# Patient Record
Sex: FEMALE | Race: OTHER | Employment: FULL TIME | URBAN - METROPOLITAN AREA
[De-identification: names, ages, dates, MRNs, and addresses within clinical notes are randomized per-mention and may not be internally consistent; named-entity substitution may affect disease eponyms.]

---

## 2017-10-06 ENCOUNTER — HOSPITAL ENCOUNTER (OUTPATIENT)
Dept: PREADMISSION TESTING | Age: 37
Discharge: HOME | End: 2017-10-06
Payer: COMMERCIAL

## 2017-10-06 VITALS — BODY MASS INDEX: 26.13 KG/M2 | HEIGHT: 62 IN | WEIGHT: 142 LBS

## 2017-10-06 LAB
ANION GAP SERPL CALC-SCNC: 8 MMOL/L (ref 3–18)
BACTERIA SPEC CULT: NORMAL
BUN SERPL-MCNC: 22 MG/DL (ref 7–18)
BUN/CREAT SERPL: 22 (ref 12–20)
CALCIUM SERPL-MCNC: 9.1 MG/DL (ref 8.5–10.1)
CHLORIDE SERPL-SCNC: 101 MMOL/L (ref 100–108)
CO2 SERPL-SCNC: 28 MMOL/L (ref 21–32)
CREAT SERPL-MCNC: 1.02 MG/DL (ref 0.6–1.3)
ERYTHROCYTE [DISTWIDTH] IN BLOOD BY AUTOMATED COUNT: 12.5 % (ref 11.6–14.5)
GLUCOSE SERPL-MCNC: 75 MG/DL (ref 74–99)
HCT VFR BLD AUTO: 41.2 % (ref 35–45)
HGB BLD-MCNC: 14.2 G/DL (ref 12–16)
MCH RBC QN AUTO: 32.1 PG (ref 24–34)
MCHC RBC AUTO-ENTMCNC: 34.5 G/DL (ref 31–37)
MCV RBC AUTO: 93 FL (ref 74–97)
PLATELET # BLD AUTO: 337 K/UL (ref 135–420)
PMV BLD AUTO: 9.2 FL (ref 9.2–11.8)
POTASSIUM SERPL-SCNC: 4.9 MMOL/L (ref 3.5–5.5)
RBC # BLD AUTO: 4.43 M/UL (ref 4.2–5.3)
SERVICE CMNT-IMP: NORMAL
SODIUM SERPL-SCNC: 137 MMOL/L (ref 136–145)
WBC # BLD AUTO: 5.1 K/UL (ref 4.6–13.2)

## 2017-10-06 PROCEDURE — 80048 BASIC METABOLIC PNL TOTAL CA: CPT | Performed by: ORTHOPAEDIC SURGERY

## 2017-10-06 PROCEDURE — 85027 COMPLETE CBC AUTOMATED: CPT | Performed by: ORTHOPAEDIC SURGERY

## 2017-10-06 PROCEDURE — 87641 MR-STAPH DNA AMP PROBE: CPT | Performed by: ORTHOPAEDIC SURGERY

## 2017-10-06 RX ORDER — IBUPROFEN 200 MG
200 TABLET ORAL AS NEEDED
COMMUNITY
End: 2017-10-17

## 2017-10-06 RX ORDER — PANTOPRAZOLE SODIUM 40 MG/1
TABLET, DELAYED RELEASE ORAL DAILY
COMMUNITY
End: 2021-02-16

## 2017-10-06 RX ORDER — CLINDAMYCIN PHOSPHATE 900 MG/50ML
900 INJECTION, SOLUTION INTRAVENOUS ONCE
Status: CANCELLED | OUTPATIENT
Start: 2017-10-25 | End: 2017-10-25

## 2017-10-06 RX ORDER — GLUCOSAMINE SULFATE 1500 MG
5000 POWDER IN PACKET (EA) ORAL DAILY
COMMUNITY

## 2017-10-06 RX ORDER — SODIUM CHLORIDE, SODIUM LACTATE, POTASSIUM CHLORIDE, CALCIUM CHLORIDE 600; 310; 30; 20 MG/100ML; MG/100ML; MG/100ML; MG/100ML
125 INJECTION, SOLUTION INTRAVENOUS CONTINUOUS
Status: CANCELLED | OUTPATIENT
Start: 2017-10-06

## 2017-10-06 RX ORDER — BUPROPION HYDROCHLORIDE 200 MG/1
300 TABLET, EXTENDED RELEASE ORAL 2 TIMES DAILY
COMMUNITY

## 2017-10-06 NOTE — PERIOP NOTES
Denies sleep apnea marcos prep reviewed does not meet criteria for special population ,HCG on admit .

## 2017-10-17 PROBLEM — M51.36 DDD (DEGENERATIVE DISC DISEASE), LUMBAR: Status: ACTIVE | Noted: 2017-10-17

## 2017-10-17 PROBLEM — M51.26 HNP (HERNIATED NUCLEUS PULPOSUS), LUMBAR: Status: ACTIVE | Noted: 2017-10-17

## 2017-10-17 PROBLEM — M48.061 SPINAL STENOSIS, LUMBAR: Status: ACTIVE | Noted: 2017-10-17

## 2017-10-17 RX ORDER — PANTOPRAZOLE SODIUM 40 MG/1
40 TABLET, DELAYED RELEASE ORAL DAILY
Status: CANCELLED | OUTPATIENT
Start: 2017-10-18

## 2017-10-17 RX ORDER — BUPROPION HYDROCHLORIDE 100 MG/1
200 TABLET, EXTENDED RELEASE ORAL 2 TIMES DAILY
Status: CANCELLED | OUTPATIENT
Start: 2017-10-17

## 2017-10-20 PROBLEM — M43.06 SPONDYLOLYSIS OF LUMBAR REGION: Status: ACTIVE | Noted: 2017-10-20

## 2017-10-20 PROBLEM — M43.16 SPONDYLOLISTHESIS OF LUMBAR REGION: Status: ACTIVE | Noted: 2017-10-20

## 2017-10-20 NOTE — H&P
Patient Name:  Real Guzman   YOB: 1980      Chief Complaint:  L4-5 spondylolysis and spondylolisthesis. History of Chief Complaint: Ms. Maira Corea is a 73-year-old female who is being seen for lower back pain and spondylolisthesis at L4-5. She has had problems across her lower back for the past four to five months. She says over the past year it has seemed to get a bit worse. She was seen a year ago and was told she had a facet cyst and had an injection that seemed to help quite a bit. She says bending, turning, and twisting cause pain. Sitting causes pain. The patient has had no numbness, no weakness, and no bowel or bladder dysfunction. She has difficulty bending backward. She has a history of Duke-Danlos disorder. 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 She says her back and legs are still giving her problems, and she is still having pain. She has difficulty bending, turning, and twisting. It is getting worse. She has numbness on the left side. She has had to drop her weights at the gym because of this pain. Past Medical/Surgical History:    Disease/Disorder Type Date Side Surgery Date Side Comment   Depression          Duke Danlos syndrome              Tonsillectomy 1985         Shoulder surgery 1993 right        Appendectomy 2009       Allergies:  No known allergies. Ingredient Reaction Medication Name Comment   NO KNOWN ALLERGIES        Current Medications:    Medication Directions   Wellbutrin  mg tablet,sustained-release take 1 tablet by oral route 2 times every day   Vyvanse 30 mg capsule take 1 capsule by oral route  every day in the morning     Social History:      SMOKING  Status Tobacco Type Units Per Day Yrs Used   Never smoker        ALCOHOL   2 drinks consumed weekly.     Family History:    Disease Detail Family Member Age Cause of Death Comments   Arthritis Father  N    Arthritis Mother  N      Review of Systems:    Pertinent positives include numbness/tingling. Pertinent negatives include chills, fever, weight change and swelling of feet. Vitals:  Date BP Pulse Temp (F) Resp. (per min.) Height (Total in.) Weight (lbs.) BMI   05/26/2016     64.00  24.72   08/20/2015 130/67 75   64.00  25.40   08/20/2015     64.00  25.40   04/27/2015 112/71 76   64.00  25.40     Physical Examination:    General:  The patient appears healthy. Cardiovascular:  Arterial pulses are normal.  Skin:  The skin is normal appearing with no contusions or wounds noted. Heart- RRR  Lungs-CTA faraz  Abdomen- +BS,soft,nontender  Musculoskeletal:  There is tenderness around the paravertebral spinal muscles. Otherwise, the thoracolumbar spine has normal kyphosis, a normal appearance, and no scoliosis. There is full range of motion of the cervical, thoracic, and lumbar spine and of the hips, knees, and ankles. Straight leg raising and crossed straight leg raising tests are negative. Neurological:  There is no weakness in the thoracic, lumbar, or sacral spine or in the lower extremities or hips. Deep tendon reflexes are present and normal bilaterally. Ankle and knee jerks are normal with no clonus. Radiograph Examination:  AP, lateral, bilateral oblique, flexion and extension views of the lumbar spine were obtained and interpreted in the office and reveal spondylolisthesis at L4-5 of 0.83 cm. On her x-rays in December 2016 she had L4-5 spondylolisthesis of 0.6 cm. An AP view of the pelvis was obtained and interpreted in the office 8/11/17 and reveals no periosteal reaction, no medullary lesions, no osteopenia, well-aligned joint spaces, no chondrolysis, and no fractures. Review of her MRI scan [facility] 12/22/16 reveals L4-5 spinal stenosis and spondylolisthesis and foraminal stenosis.     Impression:  Ms. Maria Ines Haile and I had a long discussion about the treatments for her L4 spondylolysis, spinal stenosis, and spondylolisthesis including surgical intervention, the risks, and benefits as well as the different surgical approaches and decision making. We also discussed nonsurgical care for this condition including medications, injections, physical therapy, rehabilitation, activity modification, and brace utilization. At this point, she would like to proceed with operative intervention given the increasing instability at L4-5. We will plan for L4 to S1 decompression and fusion and posterior lumbar interbody fusion with medial screws as an outpatient. The risks versus the benefits as well as the alternatives were fully explained to the patient. Risks include, but are not limited to, paralysis, death, heart attack, stroke, pulmonary embolism, deep vein thrombosis, infection, failure to relieve pain, increase in back or leg pain, reherniation of disc material, need for revision surgery, scarring, spinal fluid leak, bowel or bladder dysfunction, disease transmission, chronic graft site pain, instrumentation failure, pseudarthrosis, and the need for chronic ambulatory assist devices. The patient states full understanding of the risks and benefits and wishes to proceed.

## 2017-10-25 ENCOUNTER — APPOINTMENT (OUTPATIENT)
Dept: GENERAL RADIOLOGY | Age: 37
End: 2017-10-25
Attending: ORTHOPAEDIC SURGERY
Payer: COMMERCIAL

## 2017-10-25 ENCOUNTER — HOSPITAL ENCOUNTER (OUTPATIENT)
Age: 37
Setting detail: OUTPATIENT SURGERY
Discharge: HOME HEALTH | End: 2017-10-25
Attending: ORTHOPAEDIC SURGERY | Admitting: ORTHOPAEDIC SURGERY
Payer: COMMERCIAL

## 2017-10-25 ENCOUNTER — ANESTHESIA EVENT (OUTPATIENT)
Dept: SURGERY | Age: 37
End: 2017-10-25
Payer: COMMERCIAL

## 2017-10-25 ENCOUNTER — ANESTHESIA (OUTPATIENT)
Dept: SURGERY | Age: 37
End: 2017-10-25
Payer: COMMERCIAL

## 2017-10-25 VITALS
HEIGHT: 63 IN | OXYGEN SATURATION: 100 % | SYSTOLIC BLOOD PRESSURE: 104 MMHG | DIASTOLIC BLOOD PRESSURE: 56 MMHG | WEIGHT: 140.5 LBS | BODY MASS INDEX: 24.89 KG/M2 | RESPIRATION RATE: 12 BRPM | HEART RATE: 72 BPM | TEMPERATURE: 97.4 F

## 2017-10-25 PROBLEM — M51.26 HNP (HERNIATED NUCLEUS PULPOSUS), LUMBAR: Status: RESOLVED | Noted: 2017-10-17 | Resolved: 2017-10-25

## 2017-10-25 PROBLEM — M48.061 SPINAL STENOSIS, LUMBAR: Status: RESOLVED | Noted: 2017-10-17 | Resolved: 2017-10-25

## 2017-10-25 LAB
ABO + RH BLD: NORMAL
BLOOD GROUP ANTIBODIES SERPL: NORMAL
HCG SERPL QL: NEGATIVE
SPECIMEN EXP DATE BLD: NORMAL

## 2017-10-25 PROCEDURE — 74011000258 HC RX REV CODE- 258: Performed by: PHYSICIAN ASSISTANT

## 2017-10-25 PROCEDURE — 77030011640 HC PAD GRND REM COVD -A: Performed by: ORTHOPAEDIC SURGERY

## 2017-10-25 PROCEDURE — 74011000250 HC RX REV CODE- 250: Performed by: PHYSICIAN ASSISTANT

## 2017-10-25 PROCEDURE — 77030008477 HC STYL SATN SLP COVD -A: Performed by: ANESTHESIOLOGY

## 2017-10-25 PROCEDURE — 77030013079 HC BLNKT BAIR HGGR 3M -A: Performed by: ANESTHESIOLOGY

## 2017-10-25 PROCEDURE — 74011000258 HC RX REV CODE- 258: Performed by: ORTHOPAEDIC SURGERY

## 2017-10-25 PROCEDURE — 76210000016 HC OR PH I REC 1 TO 1.5 HR: Performed by: ORTHOPAEDIC SURGERY

## 2017-10-25 PROCEDURE — 77030006643: Performed by: ANESTHESIOLOGY

## 2017-10-25 PROCEDURE — 84703 CHORIONIC GONADOTROPIN ASSAY: CPT | Performed by: ORTHOPAEDIC SURGERY

## 2017-10-25 PROCEDURE — 74011250636 HC RX REV CODE- 250/636

## 2017-10-25 PROCEDURE — 77030018836 HC SOL IRR NACL ICUM -A: Performed by: ORTHOPAEDIC SURGERY

## 2017-10-25 PROCEDURE — 74011000250 HC RX REV CODE- 250

## 2017-10-25 PROCEDURE — 76060000035 HC ANESTHESIA 2 TO 2.5 HR: Performed by: ORTHOPAEDIC SURGERY

## 2017-10-25 PROCEDURE — 77030003029 HC SUT VCRL J&J -B: Performed by: ORTHOPAEDIC SURGERY

## 2017-10-25 PROCEDURE — 77030032753 HC BIT DRL SLD SPFT -B: Performed by: ORTHOPAEDIC SURGERY

## 2017-10-25 PROCEDURE — 77030020782 HC GWN BAIR PAWS FLX 3M -B: Performed by: ORTHOPAEDIC SURGERY

## 2017-10-25 PROCEDURE — C1713 ANCHOR/SCREW BN/BN,TIS/BN: HCPCS | Performed by: ORTHOPAEDIC SURGERY

## 2017-10-25 PROCEDURE — 74011250636 HC RX REV CODE- 250/636: Performed by: ANESTHESIOLOGY

## 2017-10-25 PROCEDURE — 36415 COLL VENOUS BLD VENIPUNCTURE: CPT | Performed by: ORTHOPAEDIC SURGERY

## 2017-10-25 PROCEDURE — 76010000131 HC OR TIME 2 TO 2.5 HR: Performed by: ORTHOPAEDIC SURGERY

## 2017-10-25 PROCEDURE — C9290 INJ, BUPIVACAINE LIPOSOME: HCPCS | Performed by: ORTHOPAEDIC SURGERY

## 2017-10-25 PROCEDURE — 77030004402 HC BUR NEUR STRY -C: Performed by: ORTHOPAEDIC SURGERY

## 2017-10-25 PROCEDURE — 77030020262 HC SOL INJ SOD CL 0.9% 100ML: Performed by: ORTHOPAEDIC SURGERY

## 2017-10-25 PROCEDURE — 77030034904 HC CGE SPN P-LIFT BLNT SPFT -I: Performed by: ORTHOPAEDIC SURGERY

## 2017-10-25 PROCEDURE — 74011000250 HC RX REV CODE- 250: Performed by: ORTHOPAEDIC SURGERY

## 2017-10-25 PROCEDURE — 77030032490 HC SLV COMPR SCD KNE COVD -B: Performed by: ORTHOPAEDIC SURGERY

## 2017-10-25 PROCEDURE — 77030034849: Performed by: ORTHOPAEDIC SURGERY

## 2017-10-25 PROCEDURE — 77030008462 HC STPLR SKN PROX J&J -A: Performed by: ORTHOPAEDIC SURGERY

## 2017-10-25 PROCEDURE — 97161 PT EVAL LOW COMPLEX 20 MIN: CPT

## 2017-10-25 PROCEDURE — 77030013708 HC HNDPC SUC IRR PULS STRY –B: Performed by: ORTHOPAEDIC SURGERY

## 2017-10-25 PROCEDURE — 77030025167 HC ELECTRD VES SEAL 1 MEDT -F: Performed by: ORTHOPAEDIC SURGERY

## 2017-10-25 PROCEDURE — 74011250637 HC RX REV CODE- 250/637: Performed by: ANESTHESIOLOGY

## 2017-10-25 PROCEDURE — 74011250636 HC RX REV CODE- 250/636: Performed by: ORTHOPAEDIC SURGERY

## 2017-10-25 PROCEDURE — 86900 BLOOD TYPING SEROLOGIC ABO: CPT | Performed by: ORTHOPAEDIC SURGERY

## 2017-10-25 PROCEDURE — 77030012406 HC DRN WND PENRS BARD -A: Performed by: ORTHOPAEDIC SURGERY

## 2017-10-25 PROCEDURE — 77030020255 HC SOL INJ LR 1000ML BG: Performed by: ORTHOPAEDIC SURGERY

## 2017-10-25 PROCEDURE — 77030008683 HC TU ET CUF COVD -A: Performed by: ANESTHESIOLOGY

## 2017-10-25 PROCEDURE — 77030003028 HC SUT VCRL J&J -A: Performed by: ORTHOPAEDIC SURGERY

## 2017-10-25 PROCEDURE — 76210000023 HC REC RM PH II 2 TO 2.5 HR: Performed by: ORTHOPAEDIC SURGERY

## 2017-10-25 DEVICE — IMPLANTABLE DEVICE: Type: IMPLANTABLE DEVICE | Site: SPINE LUMBAR | Status: FUNCTIONAL

## 2017-10-25 DEVICE — SCREW SPNL 6.5X40MM QUAD RBM COAT PEDFUSE REMIND LES: Type: IMPLANTABLE DEVICE | Site: SPINE LUMBAR | Status: FUNCTIONAL

## 2017-10-25 DEVICE — SPACER SPNL W8XH10XL27MM STR BULLETED BLNT P-LIFT: Type: IMPLANTABLE DEVICE | Site: SPINE LUMBAR | Status: FUNCTIONAL

## 2017-10-25 DEVICE — SET SCR SPNL L5-7MM PEDFUSE: Type: IMPLANTABLE DEVICE | Site: SPINE LUMBAR | Status: FUNCTIONAL

## 2017-10-25 DEVICE — ROD SP LORDTC PEDFUSE 5.5X40MM: Type: IMPLANTABLE DEVICE | Site: SPINE LUMBAR | Status: FUNCTIONAL

## 2017-10-25 RX ORDER — SODIUM CHLORIDE, SODIUM LACTATE, POTASSIUM CHLORIDE, CALCIUM CHLORIDE 600; 310; 30; 20 MG/100ML; MG/100ML; MG/100ML; MG/100ML
125 INJECTION, SOLUTION INTRAVENOUS CONTINUOUS
Status: DISCONTINUED | OUTPATIENT
Start: 2017-10-25 | End: 2017-10-25 | Stop reason: HOSPADM

## 2017-10-25 RX ORDER — NEOSTIGMINE METHYLSULFATE 5 MG/5 ML
SYRINGE (ML) INTRAVENOUS AS NEEDED
Status: DISCONTINUED | OUTPATIENT
Start: 2017-10-25 | End: 2017-10-25 | Stop reason: HOSPADM

## 2017-10-25 RX ORDER — PROPOFOL 10 MG/ML
INJECTION, EMULSION INTRAVENOUS AS NEEDED
Status: DISCONTINUED | OUTPATIENT
Start: 2017-10-25 | End: 2017-10-25 | Stop reason: HOSPADM

## 2017-10-25 RX ORDER — CLINDAMYCIN HYDROCHLORIDE 150 MG/1
150 CAPSULE ORAL EVERY 6 HOURS
Qty: 4 CAP | Refills: 0 | Status: SHIPPED | OUTPATIENT
Start: 2017-10-25 | End: 2017-10-26

## 2017-10-25 RX ORDER — FENTANYL CITRATE 50 UG/ML
INJECTION, SOLUTION INTRAMUSCULAR; INTRAVENOUS AS NEEDED
Status: DISCONTINUED | OUTPATIENT
Start: 2017-10-25 | End: 2017-10-25 | Stop reason: HOSPADM

## 2017-10-25 RX ORDER — KETOROLAC TROMETHAMINE 30 MG/ML
INJECTION, SOLUTION INTRAMUSCULAR; INTRAVENOUS AS NEEDED
Status: DISCONTINUED | OUTPATIENT
Start: 2017-10-25 | End: 2017-10-25 | Stop reason: HOSPADM

## 2017-10-25 RX ORDER — LUBIPROSTONE 24 UG/1
24 CAPSULE, GELATIN COATED ORAL 2 TIMES DAILY WITH MEALS
Qty: 20 CAP | Refills: 0 | Status: SHIPPED | OUTPATIENT
Start: 2017-10-25 | End: 2017-11-04

## 2017-10-25 RX ORDER — OXYCODONE AND ACETAMINOPHEN 5; 325 MG/1; MG/1
1-2 TABLET ORAL
Qty: 84 TAB | Refills: 0 | Status: SHIPPED | OUTPATIENT
Start: 2017-10-25 | End: 2017-11-01

## 2017-10-25 RX ORDER — ROCURONIUM BROMIDE 10 MG/ML
INJECTION, SOLUTION INTRAVENOUS AS NEEDED
Status: DISCONTINUED | OUTPATIENT
Start: 2017-10-25 | End: 2017-10-25 | Stop reason: HOSPADM

## 2017-10-25 RX ORDER — MORPHINE SULFATE 2 MG/ML
4 INJECTION, SOLUTION INTRAMUSCULAR; INTRAVENOUS
Status: DISCONTINUED | OUTPATIENT
Start: 2017-10-25 | End: 2017-10-25 | Stop reason: HOSPADM

## 2017-10-25 RX ORDER — SODIUM CHLORIDE 0.9 % (FLUSH) 0.9 %
5-10 SYRINGE (ML) INJECTION AS NEEDED
Status: DISCONTINUED | OUTPATIENT
Start: 2017-10-25 | End: 2017-10-25 | Stop reason: HOSPADM

## 2017-10-25 RX ORDER — ACETAMINOPHEN 10 MG/ML
1000 INJECTION, SOLUTION INTRAVENOUS ONCE
Status: COMPLETED | OUTPATIENT
Start: 2017-10-25 | End: 2017-10-25

## 2017-10-25 RX ORDER — CLINDAMYCIN PHOSPHATE 900 MG/50ML
900 INJECTION, SOLUTION INTRAVENOUS ONCE
Status: COMPLETED | OUTPATIENT
Start: 2017-10-25 | End: 2017-10-25

## 2017-10-25 RX ORDER — LIDOCAINE HYDROCHLORIDE 20 MG/ML
INJECTION, SOLUTION EPIDURAL; INFILTRATION; INTRACAUDAL; PERINEURAL AS NEEDED
Status: DISCONTINUED | OUTPATIENT
Start: 2017-10-25 | End: 2017-10-25 | Stop reason: HOSPADM

## 2017-10-25 RX ORDER — KETAMINE HYDROCHLORIDE 10 MG/ML
INJECTION, SOLUTION INTRAMUSCULAR; INTRAVENOUS AS NEEDED
Status: DISCONTINUED | OUTPATIENT
Start: 2017-10-25 | End: 2017-10-25 | Stop reason: HOSPADM

## 2017-10-25 RX ORDER — ONDANSETRON 2 MG/ML
INJECTION INTRAMUSCULAR; INTRAVENOUS AS NEEDED
Status: DISCONTINUED | OUTPATIENT
Start: 2017-10-25 | End: 2017-10-25 | Stop reason: HOSPADM

## 2017-10-25 RX ORDER — DEXAMETHASONE SODIUM PHOSPHATE 4 MG/ML
INJECTION, SOLUTION INTRA-ARTICULAR; INTRALESIONAL; INTRAMUSCULAR; INTRAVENOUS; SOFT TISSUE AS NEEDED
Status: DISCONTINUED | OUTPATIENT
Start: 2017-10-25 | End: 2017-10-25 | Stop reason: HOSPADM

## 2017-10-25 RX ORDER — MIDAZOLAM HYDROCHLORIDE 1 MG/ML
INJECTION, SOLUTION INTRAMUSCULAR; INTRAVENOUS AS NEEDED
Status: DISCONTINUED | OUTPATIENT
Start: 2017-10-25 | End: 2017-10-25 | Stop reason: HOSPADM

## 2017-10-25 RX ORDER — MORPHINE SULFATE 15 MG/1
15 TABLET, FILM COATED, EXTENDED RELEASE ORAL
Qty: 20 TAB | Refills: 0 | Status: SHIPPED | OUTPATIENT
Start: 2017-10-25 | End: 2017-11-04

## 2017-10-25 RX ORDER — METOCLOPRAMIDE HYDROCHLORIDE 5 MG/ML
INJECTION INTRAMUSCULAR; INTRAVENOUS AS NEEDED
Status: DISCONTINUED | OUTPATIENT
Start: 2017-10-25 | End: 2017-10-25 | Stop reason: HOSPADM

## 2017-10-25 RX ORDER — GLYCOPYRROLATE 0.2 MG/ML
INJECTION INTRAMUSCULAR; INTRAVENOUS AS NEEDED
Status: DISCONTINUED | OUTPATIENT
Start: 2017-10-25 | End: 2017-10-25 | Stop reason: HOSPADM

## 2017-10-25 RX ORDER — SCOLOPAMINE TRANSDERMAL SYSTEM 1 MG/1
1.5 PATCH, EXTENDED RELEASE TRANSDERMAL ONCE
Status: COMPLETED | OUTPATIENT
Start: 2017-10-25 | End: 2017-10-25

## 2017-10-25 RX ADMIN — ROCURONIUM BROMIDE 5 MG: 10 INJECTION, SOLUTION INTRAVENOUS at 11:57

## 2017-10-25 RX ADMIN — PROPOFOL 200 MG: 10 INJECTION, EMULSION INTRAVENOUS at 11:18

## 2017-10-25 RX ADMIN — GLYCOPYRROLATE 0.4 MG: 0.2 INJECTION INTRAMUSCULAR; INTRAVENOUS at 12:56

## 2017-10-25 RX ADMIN — CLINDAMYCIN PHOSPHATE 900 MG: 900 INJECTION, SOLUTION INTRAVENOUS at 11:22

## 2017-10-25 RX ADMIN — SODIUM CHLORIDE, SODIUM LACTATE, POTASSIUM CHLORIDE, AND CALCIUM CHLORIDE: 600; 310; 30; 20 INJECTION, SOLUTION INTRAVENOUS at 13:09

## 2017-10-25 RX ADMIN — FENTANYL CITRATE 50 MCG: 50 INJECTION, SOLUTION INTRAMUSCULAR; INTRAVENOUS at 11:49

## 2017-10-25 RX ADMIN — ROCURONIUM BROMIDE 5 MG: 10 INJECTION, SOLUTION INTRAVENOUS at 11:47

## 2017-10-25 RX ADMIN — FENTANYL CITRATE 100 MCG: 50 INJECTION, SOLUTION INTRAMUSCULAR; INTRAVENOUS at 11:17

## 2017-10-25 RX ADMIN — GLYCOPYRROLATE 0.1 MG: 0.2 INJECTION INTRAMUSCULAR; INTRAVENOUS at 12:43

## 2017-10-25 RX ADMIN — MIDAZOLAM HYDROCHLORIDE 2 MG: 1 INJECTION, SOLUTION INTRAMUSCULAR; INTRAVENOUS at 11:10

## 2017-10-25 RX ADMIN — METOCLOPRAMIDE HYDROCHLORIDE 5 MG: 5 INJECTION INTRAMUSCULAR; INTRAVENOUS at 11:48

## 2017-10-25 RX ADMIN — SODIUM CHLORIDE 1 G: 900 INJECTION, SOLUTION INTRAVENOUS at 11:34

## 2017-10-25 RX ADMIN — Medication 3 MG: at 12:56

## 2017-10-25 RX ADMIN — FENTANYL CITRATE 25 MCG: 50 INJECTION, SOLUTION INTRAMUSCULAR; INTRAVENOUS at 12:35

## 2017-10-25 RX ADMIN — ROCURONIUM BROMIDE 50 MG: 10 INJECTION, SOLUTION INTRAVENOUS at 11:18

## 2017-10-25 RX ADMIN — KETOROLAC TROMETHAMINE 60 MG: 30 INJECTION, SOLUTION INTRAMUSCULAR; INTRAVENOUS at 13:02

## 2017-10-25 RX ADMIN — SODIUM CHLORIDE, SODIUM LACTATE, POTASSIUM CHLORIDE, AND CALCIUM CHLORIDE 125 ML/HR: 600; 310; 30; 20 INJECTION, SOLUTION INTRAVENOUS at 10:58

## 2017-10-25 RX ADMIN — SCOPALAMINE 1 PATCH: 1 PATCH, EXTENDED RELEASE TRANSDERMAL at 11:13

## 2017-10-25 RX ADMIN — SODIUM CHLORIDE, SODIUM LACTATE, POTASSIUM CHLORIDE, AND CALCIUM CHLORIDE: 600; 310; 30; 20 INJECTION, SOLUTION INTRAVENOUS at 12:12

## 2017-10-25 RX ADMIN — LIDOCAINE HYDROCHLORIDE 50 MG: 20 INJECTION, SOLUTION EPIDURAL; INFILTRATION; INTRACAUDAL; PERINEURAL at 11:18

## 2017-10-25 RX ADMIN — FENTANYL CITRATE 25 MCG: 50 INJECTION, SOLUTION INTRAMUSCULAR; INTRAVENOUS at 13:17

## 2017-10-25 RX ADMIN — ONDANSETRON 4 MG: 2 INJECTION INTRAMUSCULAR; INTRAVENOUS at 13:08

## 2017-10-25 RX ADMIN — KETAMINE HYDROCHLORIDE 10 MG: 10 INJECTION, SOLUTION INTRAMUSCULAR; INTRAVENOUS at 12:35

## 2017-10-25 RX ADMIN — DEXAMETHASONE SODIUM PHOSPHATE 4 MG: 4 INJECTION, SOLUTION INTRA-ARTICULAR; INTRALESIONAL; INTRAMUSCULAR; INTRAVENOUS; SOFT TISSUE at 11:44

## 2017-10-25 RX ADMIN — GLYCOPYRROLATE 0.1 MG: 0.2 INJECTION INTRAMUSCULAR; INTRAVENOUS at 12:28

## 2017-10-25 RX ADMIN — ACETAMINOPHEN 1000 MG: 10 INJECTION, SOLUTION INTRAVENOUS at 11:48

## 2017-10-25 RX ADMIN — KETAMINE HYDROCHLORIDE 10 MG: 10 INJECTION, SOLUTION INTRAMUSCULAR; INTRAVENOUS at 11:49

## 2017-10-25 RX ADMIN — FENTANYL CITRATE 50 MCG: 50 INJECTION, SOLUTION INTRAMUSCULAR; INTRAVENOUS at 13:37

## 2017-10-25 NOTE — ANESTHESIA POSTPROCEDURE EVALUATION
Post-Anesthesia Evaluation and Assessment    Cardiovascular Function/Vital Signs  Visit Vitals    /57 (BP 1 Location: Right arm, BP Patient Position: At rest;Lying left side)    Pulse 74    Temp 36.8 °C (98.3 °F)    Resp 19    Ht 5' 2.5\" (1.588 m)    Wt 63.7 kg (140 lb 8 oz)    SpO2 99%    BMI 25.29 kg/m2       Patient is status post Procedure(s):  L4-L5 DECOMPRESSION AND FUSION,POSTERIOR LUMBAR INTERBODY FUSION W/C-ARM. Nausea/Vomiting: Controlled. Postoperative hydration reviewed and adequate. Pain:  Pain Scale 1: FLACC (10/25/17 1445)  Pain Intensity 1: 1 (10/25/17 1445)   Managed. Neurological Status:   Neuro (WDL): Within Defined Limits (10/25/17 1445)   At baseline. Mental Status and Level of Consciousness: Arousable. Pulmonary Status:   O2 Device: Room air (10/25/17 1445)   Adequate oxygenation and airway patent. Complications related to anesthesia: None    Post-anesthesia assessment completed. No concerns. Patient has met all discharge requirements.     Signed By: Dyan Cisneros CRNA    October 25, 2017

## 2017-10-25 NOTE — BRIEF OP NOTE
BRIEF OPERATIVE NOTE    Date of Procedure: 10/25/2017   Preoperative Diagnosis: LUMBAR HERNIATED NUCLEUS PULPOSIS W/RADICULOPATHY,LUMBAR [address] STENOSIS  Postoperative Diagnosis: PULPOSIS W/RADICULOPATHY,LUMBAR     Procedure: Procedure(s):  L4-L5 DECOMPRESSION AND FUSION,POSTERIOR LUMBAR INTERBODY FUSION W/C-ARM  Surgeon(s) and Role:     * Yasir Salazar MD - Primary  Assistant: Vira Ramirez PA-C  Anesthesia: General   Estimated Blood Loss: 100cc  Specimens: * No specimens in log *   Findings: spondylolysis L4. Spondylolisthesis, spinal stenosis, DDD C2-3   Complications: none  Implants:   Implant Name Type Inv.  Item Serial No.  Lot No. LRB No. Used Action   GRAFT PUTTY DBM H-GENIN 5CC --  - [id number]  GRAFT PUTTY DBM H-GENIN 5CC --  [id number] [employer] [id number] N/A 1 Implanted   SCR SYS QUAD RBM 6.5X40MM -- PEDFUSE REMIND LES - [id number]  SCR SYS QUAD RBM 6.5X40MM -- PEDFUSE REMIND LES  [employer]INE[employer] [id number] N/A 4 Implanted   SCR SYS QUAD RBM 6.5X40MM -- PEDFUSE REMIND LES - [id number]  SCR SYS QUAD RBM 6.5X40MM -- PEDFUSE REMIND LES  [employer]ER  N/A 4 Implanted   CAGE SPNE STR BLNT [id number] -- P-LIFT - [id number]  CAGE SPNE STR BLNT [id number] -- P-LIFT  [employer]ER  N/A 1 Implanted   ZOILA SP LORDTC PEDFUSE 5.5X40MM --  - [id number]  ZOILA SP LORDTC PEDFUSE 5.5X40MM --   [employer]INE[employer]ON[employer]ER  N/A 2 Implanted   SCR SET PEDFUSE 5-7MM --  - [id number]   SCR SET PEDFUSE 5-7MM --    [employer]INE[employer]ONTIER   N/A 4 Implanted

## 2017-10-25 NOTE — ANESTHESIA PREPROCEDURE EVALUATION
Anesthetic History     PONV          Review of Systems / Medical History  Patient summary reviewed, nursing notes reviewed and pertinent labs reviewed    Pulmonary                   Neuro/Psych         Psychiatric history     Cardiovascular  Within defined limits                Exercise tolerance: >4 METS     GI/Hepatic/Renal     GERD           Endo/Other        Arthritis     Other Findings   Comments: [facility]           Physical Exam    Airway  Mallampati: III  TM Distance: 4 - 6 cm  Neck ROM: decreased range of motion   Mouth opening: Normal     Cardiovascular  Regular rate and rhythm,  S1 and S2 normal,  no murmur, click, rub, or gallop  Rhythm: regular  Rate: normal         Dental  No notable dental hx       Pulmonary  Breath sounds clear to auscultation               Abdominal  GI exam deferred       Other Findings            Anesthetic Plan    ASA: 2  Anesthesia type: general          Induction: Intravenous  Anesthetic plan and risks discussed with: Patient and Family

## 2017-10-25 NOTE — PROGRESS NOTES
Pt lying slightly left sided, ice pack applied to bandage area on low back, blanket roll tucked behind pt for comfort, HOB elevated to 20 degrees; pt strongly states she is absolutely not ready to go to next phase of discharge, pain is 8/10 although she falls asleep easily, reminding pt to take deep breaths

## 2017-10-25 NOTE — DISCHARGE INSTRUCTIONS
[facility]  Dr. Benjamin Lim ANYONE WHO SMOKES. AVOID ALL PRODUCTS THAT CONTAIN NICOTINE. DO NOT TAKE IBUPROFEN OR ANTI--INFLAMMATORIES, AS THESE MAY ALTER THE HEALING OF THE FUSION. ACTIVITIES :  *The first week after surgery   1. You may be up and walking about the house. 2.  Activities around the house, such as washing dishes, fixing light meals, and your own personal care are fine. 3.  Avoid strenuous activities, such as vacuuming, lifting laundry or grocery bags. 4.  Do not lift anything heavier than 1 gallon of milk (or about 5-8 pounds). 5.  Do not bend over to  items from the ground level until 3 months post-op. *Week 2 and beyond  1. You may gradually increase your activities, but still avoid heavy lifting, pushing/pulling. 2.  Walking is the best way to rebuild strength and stamina. Start SLOWLY and gradually increase the distance a little every week. 3.  Walk at a pace that avoids fatigue or severe pain. Do not try to walk several blocks the first day! As you increase the distance, you may feel tired. If so, stop and rest.   4.  You should be able to walk several blocks by your first clinic visit. 5.  Follow-up with Dr. Obie Cruz in 10-14 days. BATHING and INCISION CARE:  1. The incision may be tender to touch or feel numb: this is normal.   2.  Keep the incision clean and dry. Do not get incision wet for 5 days. The incision will be closed with sutures under the skin and the skin will be glued. 3.  Do not apply any lotions, ointments or oils on the incision. 4.  If you notice any excessive swelling, redness, or persistent drainage around the incision, notify the office immediately. DRIVIN. You should not drive until after your follow-up appointment. 2.  You can be in a vehicle for short distances, but if you travel any long distance, please stop about every 30 minutes and walk/stretch. 3.  You should NEVER drive while taking narcotic medication. RETURN TO WORK :  1. The decision to return to work will be determined on an individual basis. 2.  Many people who have a strenuous job (construction, heavy labor, etc) may need to be off work for up to 12 weeks. 3.  If you need a work note, please let us know as soon as possible, and not the same day you are planning to return to work. NUTRITION :  1.  Good nutrition is an essential part of healing. 2.  You should eat a balanced diet each day, including fruits, vegetables, dairy products and protein. 3.  Remember to drink plenty of water. 4.  If you have not had a bowel movement within 3 days of surgery, you will need to use a laxative or suppository that can be obtained over-the-counter at your local pharmacy. BONE STIMULATOR:  1. A spinal bone stimulator may be prescribed for you under certain situations. 2.  A nurse will call you if one has been requested and discuss its use for approximately 4-6 months post-op every day. 3.  This device assists in bone healing and fusion. MEDICATIONS -  1. You may resume the medications you were taking before surgery, with the general exception of (or DO NOT TAKE) non-steroidal anti-inflammatory medications, such as Motrin, Aleve, Advil Naprosyn, Ibuprofen or aspirin. 2.  You will receive a prescription for pain medication at discharge from the hospital. The pain medication works best if taken before the pain becomes severe. 3.  To reduce stomach upset, always take the medication with food. 4.  Begin to wean yourself off the pain medication during the second week after discharge. 5.  If you need a refill, please call the office during working hours at least 2 days before your prescription runs out. Do not wait until your bottle is empty to call for a refill. 6.  DO NOT drive if you are taking narcotic pain medications.     HOME HEALTH CARE:  1.   A home health care service has been set-up for you to help assist you once you leave the hospital.  2.  They will contact you either before you leave the hospital or within 24 hours once you have been discharged home. 3. A nurse will assist you with your dressing changes and a Physical Therapist with help you with your therapy needs. CALL THE OFFICE:   If you have severe pain unrelieved by the medications, new numbness or tingling in your legs;   If you have a fever of 101.0°F or greater;    If you notice excessive swelling, redness, or persistent drainage from the incision or IV site; [facility] office number is [phone] from 8:00am to 5:00pm Monday through Friday. After 5:00pm, on weekends, or holidays, please leave a message with our answering service and the doctor on-call will get back to you shortly. DISCHARGE SUMMARY from Nurse    PATIENT INSTRUCTIONS:    After general anesthesia or intravenous sedation, for 24 hours or while taking prescription Narcotics:  · Limit your activities  · Do not drive and operate hazardous machinery  · Do not make important personal or business decisions  · Do  not drink alcoholic beverages  · If you have not urinated within 8 hours after discharge, please contact your surgeon on call. Report the following to your surgeon:  · Excessive pain, swelling, redness or odor of or around the surgical area  · Temperature over 100.5  · Nausea and vomiting lasting longer than 4 hours or if unable to take medications  · Any signs of decreased circulation or nerve impairment to extremity: change in color, persistent  numbness, tingling, coldness or increase pain  · Any questions    What to do at Home:  Recommended activity: Activity as tolerated and no driving for today, Ambulate in house, No lifting, Driving, or Strenuous exercise until advised and No driving while on analgesics. If you experience any of the following symptoms as above, please follow up with Dr. Matias Lee.     * Please give a list of your current medications to your Primary Care Provider. *  Please update this list whenever your medications are discontinued, doses are      changed, or new medications (including over-the-counter products) are added. *  Please carry medication information at all times in case of emergency situations. These are general instructions for a healthy lifestyle:    No smoking/ No tobacco products/ Avoid exposure to second hand smoke  Surgeon General's Warning:  Quitting smoking now greatly reduces serious risk to your health. Obesity, smoking, and sedentary lifestyle greatly increases your risk for illness    A healthy diet, regular physical exercise & weight monitoring are important for maintaining a healthy lifestyle    You may be retaining fluid if you have a history of heart failure or if you experience any of the following symptoms:  Weight gain of 3 pounds or more overnight or 5 pounds in a week, increased swelling in our hands or feet or shortness of breath while lying flat in bed. Please call your doctor as soon as you notice any of these symptoms; do not wait until your next office visit. Recognize signs and symptoms of STROKE:    F-face looks uneven    A-arms unable to move or move unevenly    S-speech slurred or non-existent    T-time-call 911 as soon as signs and symptoms begin-DO NOT go       Back to bed or wait to see if you get better-TIME IS BRAIN. Warning Signs of HEART ATTACK     Call 911 if you have these symptoms:   Chest discomfort. Most heart attacks involve discomfort in the center of the chest that lasts more than a few minutes, or that goes away and comes back. It can feel like uncomfortable pressure, squeezing, fullness, or pain.  Discomfort in other areas of the upper body. Symptoms can include pain or discomfort in one or both arms, the back, neck, jaw, or stomach.  Shortness of breath with or without chest discomfort.    Other signs may include breaking out in a cold sweat, nausea, or lightheadedness. Don't wait more than five minutes to call 911 - MINUTES MATTER! Fast action can save your life. Calling 911 is almost always the fastest way to get lifesaving treatment. Emergency Medical Services staff can begin treatment when they arrive -- up to an hour sooner than if someone gets to the hospital by car. The discharge information has been reviewed with the patient and caregiver. The patient and caregiver verbalized understanding. Discharge medications reviewed with the patient and caregiver and appropriate educational materials and side effects teaching were provided.     Patient armband removed and shredded    ___________________________________________________________________________________________________________________________________

## 2017-10-25 NOTE — PERIOP NOTES
AVS med list reviewed and verified, no duplicates, with RN.
Assistant patient wipe surgical site.
C/o feeling like \"i'm going to pass out\"  Positioned in recliner with iv fluids on full skin pink and dry
Care transferred to Beverly Melo RN.
Dr Rachel Rodriguez in to speak with patient.
Family updated regarding Pt's condition and care progress.
No EKG needed per Dr Kaylene Diaz.
Physical therapy present for ambulation and at home techniques
Resting on stretcher - no c/o offered - waiting for PT
Reviewed PTA medication list with patient/caregiver and patient/caregiver denies any additional medications.  Patient admits to having a responsible adult care for them for at least 24 hours after surgery.   
Sbar report given to [employer].
Improved.

## 2017-10-25 NOTE — PROGRESS NOTES
TRANSFER - OUT REPORT:    Verbal report given to Lucía Brown RN(name) on Tammie Ham  being transferred to phase 2(unit) for routine progression of care       Report consisted of patients Situation, Background, Assessment and   Recommendations(SBAR). Information from the following report(s) SBAR, OR Summary, Procedure Summary, Intake/Output, MAR, Recent Results and Cardiac Rhythm nsr was reviewed with the receiving nurse. Lines:   Peripheral IV 10/25/17 Left Hand (Active)   Site Assessment Clean, dry, & intact 10/25/2017  1:35 PM   Phlebitis Assessment 0 10/25/2017  1:35 PM   Infiltration Assessment 0 10/25/2017  1:35 PM   Dressing Status Clean, dry, & intact 10/25/2017  1:35 PM   Dressing Type Transparent 10/25/2017  1:35 PM   Hub Color/Line Status Infusing 10/25/2017  1:35 PM   Alcohol Cap Used No 10/25/2017 10:54 AM        Opportunity for questions and clarification was provided.       Patient transported with:   [employer]

## 2017-10-25 NOTE — PROGRESS NOTES
Assumed care of pt at this time, report received from [place] for lunch relief; pt in nad, vss, pt able to turn to her left side without assist to visualize bandage to mid/low back c/d/i; [place] asked pt if she would like any further pain medication and pt declined states she cant even keep her eyes open:\"

## 2017-10-25 NOTE — INTERVAL H&P NOTE
H&P Update:  Kirti Christian was seen and examined. History and physical has been reviewed. The patient has been examined.  There have been no significant clinical changes since the completion of the originally dated History and Physical.    Signed By: Kaylene Mcclellan MD     October 25, 2017 10:13 AM

## 2017-10-25 NOTE — OP NOTES
PROCEDURE: After correct identification, the patient was   taken to the operating room, placed supine on the table, general   endotracheal anesthesia induced. 2grams of Ancef was given. Patient was then rotated prone onto the Dangelo Croissant table. Lumbar spine was prepped and draped in the usual sterile fashion. Midline incision was made in the lumbar spine, taken down to the spinous processes of L4-5. The posterior transverse processes bilaterally were expose at L4-5 as seen on intraoperative fluoroscopy. Gelpi retractors were then placed. The wound was then irrigated. Complete wide laminectomy was performed at L4 bilaterally and the superior half of L5 bilaterally. Removal of more than 1/2 of the medial facets bilaterally allowed excellent midline decompression. Foraminotomies which included undercutting the pars intra-articularis were then performed bilaterally at L4-5 until a Penfield 3 was easily passed through each of the neural foramen. This allowed visualization of the L4-5 nerve roots. The wound   was then irrigated. Bur was then used to open the posterior aspect of the   pars bilaterally at L4-5. A drill was then placed through   each of these posterior bur holes, through the pedicle, into vertebral body   under intraoperative fluoroscopy. Ball-tipped probe was then placed through   each drill tracts, ensuring the drill had only gone through the bone. Pedicle screws from [employer] spinal system were then placed bilaterally at L4-5. The posterior aspect of the disc spaces at L4-5 was exposed. Bleeding controlled with bipolar electrocautery. The nerve root was held with a nerve root retractor. #15 blade was then used to excise the annulus at each level. The L4-5 disc space was then debrided with reamers down to bleeding subchondral bone. The interbody space at L4-5 was sized to size 10mm. Allograft was impacted into the interbody space.   Peak interbody spacers of appropriate size were filled with allograft and then impacted into the interbody space. Interoperative x-ray revealed excellent alingment of the hardware and anatomy. Rods were then fixed to the pedicle screws using the locking caps. Compression was then accomplished across the interbody grafts. Each of these caps were then torqued into position. Intraoperative x-ray revealed excellent alignment of the  hardware and anatomy. Wound was then irrigated with 1000cc of pulse lavage irrigation containing Bacitracin. Bur was then used to open the posterior aspect of the facet joints and transverse process bilaterally as well as removing of the cartilage surface of the facet joints. Bone graft that was taken from the laminectomy site was then placed in the   posterolateral gutters as well as in facet joints. Exparel 20cc was mixed with 80cc of Normal Saline and infiltrated into the subfascial and subcutaneous layers. Fascia was then closed using #1 Vicryl suture. Subcutaneous tissue   approximated with 2-0 Vicryl suture. Skin approximated with 3-0 prolene and Dermabond. Sterile dressing was applied. The patient tolerated the procedure well and taken to Recovery room in good   condition.

## 2017-10-25 NOTE — IP AVS SNAPSHOT
[facility] 
 
 
 [address] [address] 
[phone] Patient: Frieda Tafoya MRN: [id number] ECY:[id number] About your hospitalization You were admitted on:  October 25, 2017 You last received care in the:  [facility] PACU You were discharged on:  October 25, 2017 Why you were hospitalized Your primary diagnosis was:  Spinal Stenosis, Lumbar Your diagnoses also included:  Hnp (Herniated Nucleus Pulposus), Lumbar, Ddd (Degenerative Disc Disease), Lumbar, Spondylolysis Of Lumbar Region, Spondylolisthesis Of Lumbar Region Things You Need To Do (next 8 weeks) Follow up with Waqar Warren MD  
  
Phone:  [phone] Where:  [address] [facility] Follow up with aMris Gramajo MD in 10 day(s) Phone:  [phone] Where:  [address], Orthopedic and [address] [address], [address] [place] [address] Discharge Orders None A check macario indicates which time of day the medication should be taken. My Medications TAKE these medications as instructed Instructions Each Dose to Equal  
 Morning Noon Evening Bedtime  
 cholecalciferol 1,000 unit Cap Commonly known as:  VITAMIN D3 Your last dose was: Your next dose is: Take 5,000 Units by mouth daily. 5000 Units  
    
   
   
   
  
 clindamycin 150 mg capsule Commonly known as:  CLEOCIN Your last dose was: Your next dose is: Take 1 Cap by mouth every six (6) hours for 4 doses. 150 mg  
    
   
   
   
  
 DEPLIN PO Your last dose was: Your next dose is: Take 15 mg by mouth nightly. 15 mg  
    
   
   
   
  
 lubiPROStone 24 mcg capsule Commonly known as:  Janice Dials Your last dose was: Your next dose is: Take 1 Cap by mouth two (2) times daily (with meals) for 10 days. Indications: OPIOID-INDUCED CONSTIPATION  
 24 mcg  
    
   
   
   
  
 morphine CR 15 mg CR tablet Commonly known as:  MS CONTIN Your last dose was: Your next dose is: Take 1 Tab by mouth every twelve (12) hours as needed for Pain for up to 10 days. Max Daily Amount: 30 mg.  
 15 mg  
    
   
   
   
  
 oxyCODONE-acetaminophen 5-325 mg per tablet Commonly known as:  PERCOCET Your last dose was: Your next dose is: Take 1-2 Tabs by mouth every four (4) hours as needed for Pain for up to 7 days. Max Daily Amount: 12 Tabs. 1-2 Tab  
    
   
   
   
  
 pantoprazole 40 mg tablet Commonly known as:  PROTONIX Your last dose was: Your next dose is: Take  by mouth daily. Indications: gastroesophageal reflux disease VYVANSE 30 mg capsule Generic drug:  lisdexamfetamine Your last dose was: Your next dose is: Take 30 mg by mouth every morningIndications: Attention-Deficit Hyperactivity Disorder. 30 mg WELLBUTRIN  mg SR tablet Generic drug:  buPROPion SR Your last dose was: Your next dose is: Take 200 mg by mouth two (2) times a day. 200 mg Where to Get Your Medications Information on where to get these meds will be given to you by the nurse or doctor. ! Ask your nurse or doctor about these medications  
  clindamycin 150 mg capsule  
 lubiPROStone 24 mcg capsule  
 morphine CR 15 mg CR tablet  
 oxyCODONE-acetaminophen 5-325 mg per tablet Discharge Instructions [facility] Dr. Marilee Umana *YOU MUST AVOID SMOKING OR BEING AROUND ANYONE WHO SMOKES. AVOID ALL PRODUCTS THAT CONTAIN NICOTINE. DO NOT TAKE IBUPROFEN OR ANTI--INFLAMMATORIES, AS THESE MAY ALTER THE HEALING OF THE FUSION. ACTIVITIES : 
*The first week after surgery 1. You may be up and walking about the house. 2.  Activities around the house, such as washing dishes, fixing light meals, and your own personal care are fine. 3.  Avoid strenuous activities, such as vacuuming, lifting laundry or grocery bags. 4.  Do not lift anything heavier than 1 gallon of milk (or about 5-8 pounds). 5.  Do not bend over to  items from the ground level until 3 months post-op. *Week 2 and beyond 1. You may gradually increase your activities, but still avoid heavy lifting, pushing/pulling. 2.  Walking is the best way to rebuild strength and stamina. Start SLOWLY and gradually increase the distance a little every week. 3.  Walk at a pace that avoids fatigue or severe pain. Do not try to walk several blocks the first day! As you increase the distance, you may feel tired. If so, stop and rest.  
4.  You should be able to walk several blocks by your first clinic visit. 5.  Follow-up with Dr. Obie Cruz in 10-14 days. BATHING and INCISION CARE: 
1. The incision may be tender to touch or feel numb: this is normal.  
2.  Keep the incision clean and dry. Do not get incision wet for 5 days. The incision will be closed with sutures under the skin and the skin will be glued. 3.  Do not apply any lotions, ointments or oils on the incision. 4.  If you notice any excessive swelling, redness, or persistent drainage around the incision, notify the office immediately. DRIVIN. You should not drive until after your follow-up appointment. 2.  You can be in a vehicle for short distances, but if you travel any long distance, please stop about every 30 minutes and walk/stretch. 3.  You should NEVER drive while taking narcotic medication. RETURN TO WORK : 
1. The decision to return to work will be determined on an individual basis. 2.  Many people who have a strenuous job (construction, heavy labor, etc) may need to be off work for up to 12 weeks. 3.  If you need a work note, please let us know as soon as possible, and not the same day you are planning to return to work. NUTRITION : 
1.  Good nutrition is an essential part of healing. 2.  You should eat a balanced diet each day, including fruits, vegetables, dairy products and protein. 3.  Remember to drink plenty of water. 4.  If you have not had a bowel movement within 3 days of surgery, you will need to use a laxative or suppository that can be obtained over-the-counter at your local pharmacy. BONE STIMULATOR: 
1. A spinal bone stimulator may be prescribed for you under certain situations. 2.  A nurse will call you if one has been requested and discuss its use for approximately 4-6 months post-op every day. 3.  This device assists in bone healing and fusion. MEDICATIONS - 
1. You may resume the medications you were taking before surgery, with the general exception of (or DO NOT TAKE) non-steroidal anti-inflammatory medications, such as Motrin, Aleve, Advil Naprosyn, Ibuprofen or aspirin. 2.  You will receive a prescription for pain medication at discharge from the hospital. The pain medication works best if taken before the pain becomes severe. 3.  To reduce stomach upset, always take the medication with food. 4.  Begin to wean yourself off the pain medication during the second week after discharge. 5.  If you need a refill, please call the office during working hours at least 2 days before your prescription runs out. Do not wait until your bottle is empty to call for a refill. 6.  DO NOT drive if you are taking narcotic pain medications. HOME HEALTH CARE: 
1.   A home health care service has been set-up for you to help assist you once you leave the hospital. 
2.  They will contact you either before you leave the hospital or within 24 hours once you have been discharged home.  
3. A nurse will assist you with your dressing changes and a Physical Therapist with help you with your therapy needs. CALL THE OFFICE: 
? If you have severe pain unrelieved by the medications, new numbness or tingling in your legs; 
? If you have a fever of 101.0°F or greater;  
? If you notice excessive swelling, redness, or persistent drainage from the incision or IV site; [facility] office number is [phone] from 8:00am to 5:00pm Monday through Friday. After 5:00pm, on weekends, or holidays, please leave a message with our answering service and the doctor on-call will get back to you shortly. DISCHARGE SUMMARY from Nurse PATIENT INSTRUCTIONS: 
 
 
F-face looks uneven A-arms unable to move or move unevenly S-speech slurred or non-existent T-time-call 911 as soon as signs and symptoms begin-DO NOT go Back to bed or wait to see if you get better-TIME IS BRAIN. Warning Signs of HEART ATTACK Call 911 if you have these symptoms: 
? Chest discomfort. Most heart attacks involve discomfort in the center of the chest that lasts more than a few minutes, or that goes away and comes back. It can feel like uncomfortable pressure, squeezing, fullness, or pain. ? Discomfort in other areas of the upper body. Symptoms can include pain or discomfort in one or both arms, the back, neck, jaw, or stomach. ? Shortness of breath with or without chest discomfort. ? Other signs may include breaking out in a cold sweat, nausea, or lightheadedness. Don't wait more than five minutes to call [address]! Fast action can save your life.  Calling 911 is almost always the fastest way to get lifesaving treatment. Emergency Medical Services staff can begin treatment when they arrive  up to an hour sooner than if someone gets to the hospital by car. The discharge information has been reviewed with the patient and caregiver. The patient and caregiver verbalized understanding. Discharge medications reviewed with the patient and caregiver and appropriate educational materials and side effects teaching were provided. Patient armband removed and shredded 
 
___________________________________________________________________________________________________________________________________ Introducing [facility]! Tay Pagan introduces [employer] patient portal. Now you can access parts of your medical record, email your doctor's office, and request medication refills online. 1. In your internet browser, go to [email or web address] 2. Click on the First Time User? Click Here link in the Sign In box. You will see the New Member Sign Up page. 3. Enter your [employer] Access Code exactly as it appears below. You will not need to use this code after youve completed the sign-up process. If you do not sign up before the expiration date, you must request a new code. · [employer] Access Code: [email or web address] Expires: 12/25/2017  2:12 PM 
 
4. Enter the last four digits of your Social Security Number (xxxx) and Date of Birth (mm/dd/yyyy) as indicated and click Submit. You will be taken to the next sign-up page. 5. Create a [employer] ID. This will be your [employer] login ID and cannot be changed, so think of one that is secure and easy to remember. 6. Create a [employer] password. You can change your password at any time. 7. Enter your Password Reset Question and Answer. This can be used at a later time if you forget your password. 8. Enter your e-mail address. You will receive e-mail notification when new information is available in [address] 9. Click Sign Up. You can now view and download portions of your medical record. 10. Click the Download Summary menu link to download a portable copy of your medical information. If you have questions, please visit the Frequently Asked Questions section of the [employer] website. Remember, [employer] is NOT to be used for urgent needs. For medical emergencies, dial 911. Now available from your iPhone and Android! Providers Seen During Your Hospitalization Provider Specialty Primary office phone Rubia Hanson, [address] [facility] Orthopedic Surgery [phone] Your Primary Care Physician (PCP) Primary Care Physician Office Phone Office Fax Nathan Farnsworth [phone] You are allergic to the following Allergen Reactions Amoxicillin Hives Carrot Other (comments) Allergy testing Gluten Other (comments) Pineapple Other (comments) Allergy testing Pork/Porcine Containing Products Other (comments) Allergy testing Scallops Other (comments) Per allergy testing Sweet Potato Other (comments) Allergy testing Recent Documentation Height Weight BMI OB Status Smoking Status 1.588 m 63.7 kg 25.29 kg/m2 Having regular periods Never Smoker Emergency Contacts Name Discharge Info Relation Home Work Mobile Alayna Gao [address] [3] Life Partner [7]   [phone] Patient Belongings The following personal items are in your possession at time of discharge: 
  Dental Appliances: None         Home Medications: None   Jewelry: None  Clothing: Undergarments, Footwear, Socks, Shirt, Pants (locker #20)    Other Valuables: Brace (locker #20) Please provide this summary of care documentation to your next provider. Signatures-by signing, you are acknowledging that this After Visit Summary has been reviewed with you and you have received a copy. Patient Signature:  ____________________________________________________________ Date:  ____________________________________________________________  
  
Mariella Artist Provider Signature:  ____________________________________________________________ Date:  ____________________________________________________________

## 2017-10-25 NOTE — PROGRESS NOTES
Problem: Mobility Impaired (Adult and Pediatric)  Goal: *Acute Goals and Plan of Care (Insert Text)  In 1-7 days pt will be able to perform:  STG  1. Bed mobility:  Demonstrate proper log-roll technique for safe initiation of rolling for OOB activities. 2.  Supine to sit to supine S with HR for meals. 3.  Sit to stand to sit S with RW/LSO in prep for ambulation. LT.  Gait:  Ambulate 150ft S with RW/LSO, for home/community mobility. 2.  Stair Negotiation:  Ascend/descend >3 steps CGA with HR for home entry. 3.  Activity tolerance: Tolerate up in chair 30 minutes-1 hour for ADLs. 4.  Patient/Family Education:  Patient/family to be independent with HEP for follow-up care and safe discharge. physical Therapy EVALUATION    Patient: Mahnaz Gutierrez (83 y.o. female)  Date: 10/25/2017  Primary Diagnosis: LUMBAR HERNIATED NUCLEUS PULPOSIS W/RADICULOPATHY,LUMBAR [address] STENOSIS  Procedure(s) (LRB):  L4-L5 DECOMPRESSION AND FUSION,POSTERIOR LUMBAR INTERBODY FUSION W/C-ARM (N/A) Day of Surgery   Precautions:   Fall, Back    ASSESSMENT :  Based on the objective data described below, the patient presents with decreased functional mobility and independence in regard to bed mobility, transfers, gt quality and tolerance, pain, stair negotiation and safety due to recent back surgery. Pt rating pain in back 8/10 on numerical pain scale. Pt instructed in log roll technique and donning/doffing and use of LSO. Pt able to participate in gt training w/ RW, LSO, GB and CGA in hallway w/ antalgic pattern. Pt able to negotiate box step w/ B rail and CGA. Pt returned to sitting in chair w/ all needs within reach. Nurse Kelly Esteves aware and life partner present. [facility]. Patient will benefit from skilled intervention to address the above impairments.   Patients rehabilitation potential is considered to be Good  Factors which may influence rehabilitation potential include:   []         None noted  []         Mental ability/status  []         Medical condition  []         Home/family situation and support systems  []         Safety awareness  [x]         Pain tolerance/management  []         Other:      PLAN :  Recommendations and Planned Interventions:  [x]           Bed Mobility Training             []    Neuromuscular Re-Education  [x]           Transfer Training                   []    Orthotic/Prosthetic Training  [x]           Gait Training                          []    Modalities  [x]           Therapeutic Exercises          []    Edema Management/Control  [x]           Therapeutic Activities            [x]    Patient and Family Training/Education  []           Other (comment):    Frequency/Duration: Patient will be followed by physical therapy twice daily to address goals. Discharge Recommendations: Home Health  Further Equipment Recommendations for Discharge: N/A     SUBJECTIVE:   Patient stated I feel nauseated.     OBJECTIVE DATA SUMMARY:     Past Medical History:   Diagnosis Date    Velásquez's esophagus     Duke-Danlos syndrome     GERD (gastroesophageal reflux disease)     Hiatal hernia     Nausea & vomiting     Psychiatric disorder     depression     Past Surgical History:   Procedure Laterality Date    HX ADENOIDECTOMY      HX APPENDECTOMY      HX BREAST AUGMENTATION      HX ORTHOPAEDIC      right shoulder    HX TONSILLECTOMY       Barriers to Learning/Limitations: None  Compensate with: visual, verbal, tactile, kinesthetic cues/model  Prior Level of Function/Home Situation:   Home Situation  Home Environment: Private residence  # Steps to Enter: 6  Rails to Enter: Yes  Hand Rails : Bilateral  One/Two Story Residence: Two story  Lift Chair Available: No  Living Alone: No  Support Systems: Spouse/Significant Other/Partner  Patient Expects to be Discharged to[de-identified] Private residence  Current DME Used/Available at Home: None  Critical Behavior:  Neurologic State: Alert; Appropriate for age  Orientation Level: Oriented X4  Cognition: Appropriate decision making; Appropriate for age attention/concentration; Appropriate safety awareness; Follows commands  Safety/Judgement: Awareness of environment  Psychosocial  Patient Behaviors: Calm; Cooperative  Family  Behaviors: Supportive;Calm  Skin Condition/Temp: Dry;Warm  Family  Behaviors: Supportive;Calm  Skin Integrity: Incision (comment) (back)  Skin Integumentary  Skin Color: Appropriate for ethnicity  Skin Condition/Temp: Dry;Warm  Skin Integrity: Incision (comment) (back)  Turgor: Non-tenting  Strength:    Strength: Generally decreased, functional  Tone & Sensation:   Tone: Normal  Sensation: Intact  Range Of Motion:  AROM: Generally decreased, functional  Functional Mobility:  Bed Mobility:  Rolling: Contact guard assistance  Supine to Sit: Contact guard assistance  Scooting: Contact guard assistance  Transfers:  Sit to Stand: Contact guard assistance  Stand to Sit: Contact guard assistance  Balance:   Sitting: Intact  Standing: Intact; With support  Ambulation/Gait Training:  Distance (ft): 100 Feet (ft)  Assistive Device: Walker, rolling;Gait belt;Brace/Splint  Ambulation - Level of Assistance: Contact guard assistance (vc)  Gait Abnormalities: Antalgic;Decreased step clearance  Base of Support: Widened  Speed/Chapis: Slow  Therapeutic Exercises:   Pain:  Pain Scale 1: Numeric (0 - 10)  Pain Intensity 1: 8  Pain Location 1: Back  Pain Orientation 1: Lower  Pain Description 1: Aching  Pain Intervention(s) 1: Medication (see MAR); Repositioned  Activity Tolerance:   Fair   Please refer to the flowsheet for vital signs taken during this treatment.   After treatment:   [x]         Patient left in no apparent distress sitting up in chair  []         Patient left in no apparent distress in bed  [x]         Call bell left within reach  [x]         Nursing notified  [x]         Caregiver present  []         Bed alarm activated    COMMUNICATION/EDUCATION:   [x]         Fall prevention education was provided and the patient/caregiver indicated understanding. [x]         Patient/family have participated as able in goal setting and plan of care. [x]         Patient/family agree to work toward stated goals and plan of care. []         Patient understands intent and goals of therapy, but is neutral about his/her participation. []         Patient is unable to participate in goal setting and plan of care.     Thank you for this referral.  Chris Cheng, PT   Time Calculation: 16 mins     Eval Complexity: History: MEDIUM  Complexity : 1-2 comorbidities / personal factors will impact the outcome/ POC Exam:LOW Complexity : 1-2 Standardized tests and measures addressing body structure, function, activity limitation and / or participation in recreation  Presentation: LOW Complexity : Stable, uncomplicated  Clinical Decision Making:Low Complexity amb >30' Overall Complexity:LOW

## 2017-12-11 ENCOUNTER — HOSPITAL ENCOUNTER (OUTPATIENT)
Dept: PHYSICAL THERAPY | Age: 37
Discharge: HOME | End: 2017-12-11
Payer: COMMERCIAL

## 2017-12-11 PROCEDURE — 97110 THERAPEUTIC EXERCISES: CPT | Performed by: PHYSICAL THERAPIST

## 2017-12-11 PROCEDURE — 97162 PT EVAL MOD COMPLEX 30 MIN: CPT | Performed by: PHYSICAL THERAPIST

## 2017-12-11 NOTE — PROGRESS NOTES
[facility] at the [address], [place], [address] [facility]  Phone: [phone]      Fax:  [phone]       Plan of Care/ Statement of Necessity for Physical Therapy Services      Patient name: Rima Arevalo Start of Care: 2017   Referral source: Paullette Goldmann, MD : 1980    Medical Diagnosis: Low back pain [M54.5]   Onset Date:10/25/17    Treatment Diagnosis: Low back pain, Varune Bad   Prior Hospitalization: see medical history Provider#: [id number]   Medications: Verified on Patient summary List    Comorbidities: Wynette Bad   Prior Level of Function: Independent with all functional mobility. Crossfitter, . Limitations to PLOF: Patient has not returned to normal gym activities. The Plan of Care and following information is based on the information from the initial evaluation. Assessment/ key information:   Patient is an active 41 y/o female who presents to outpatient PT s/p L4-L5 lumbar fusion on 10/25/17 by Dr. Donovan Ramsey secondary to spondylolisthesis. Patient with history of Wynette Bad and has been able to maintain function through body building and crossfit. Patient understands she will not be able to return to heavy lifting again but would like to return to her prior gym activities at a decreased weight level. During evaluation, patient demonstrates quad dominance during squat assessment with poor engagement of glutes and hamstrings as well as poor quad flexibility.  In addition, patient demonstrates overuse of global core musculature and poor activiation of local lumbar musculature.      Patient will  benefit from skilled PT services to modify and progress therapeutic interventions, address functional mobility deficits, address ROM deficits, address strength deficits, analyze and address soft tissue restrictions, analyze and cue movement patterns, analyze and modify body mechanics/ergonomics and assess and modify postural abnormalities to attain remaining goals. Evaluation Complexity History MEDIUM  Complexity : 1-2 comorbidities / personal factors will impact the outcome/ POC ; Examination LOW Complexity : 1-2 Standardized tests and measures addressing body structure, function, activity limitation and / or participation in recreation  ;Presentation MEDIUM Complexity : Evolving with changing characteristics  ; Clinical Decision Making MEDIUM Complexity : FOTO score of 26-74  Overall Complexity Rating: MEDIUM  Problem List: pain affecting function, decrease ROM, decrease strength, decrease ADL/ functional abilitiies and decrease activity tolerance   Treatment Plan may include any combination of the following: Therapeutic exercise, Therapeutic activities, Neuromuscular re-education, Physical agent/modality, Manual therapy, Patient education, Self Care training and Functional mobility training  Patient / Family readiness to learn indicated by: asking questions and interest  Persons(s) to be included in education: patient (P)  Barriers to Learning/Limitations: None  Patient Goal (s): Get back to regular workout routine  Patient Self Reported Health Status: excellent  Rehabilitation Potential: good    Progress towards goals / Updated goals:  Short Term Goals: STG- To be accomplished in 3 week(s):  1. Pt will be independent with HEP to encourage prophylaxis. Eval:To be initiated next session  Current: NA     Long Term Goals: LTG- To be accomplished in 6-8 week(s):  1. Pt will demonstrate improvement in quad flexibility by > 10 degrees in maria elena test position in order to improve biomechanics during squats lunges. Eval:                        -Left Knee:28 degrees                         -Right knee: 35 degrees  Current: NA     2.  Pt will demonstrate full pain free AROM of lumbar spine in order to return to PLOF.   Eval:  ROM % AROM Comments:pain, area   Forward flexion 40-60 100%  Poor reversal of lumbar lordosis, all hamstring mobility   Extension 20-30 25% \"hard stop\" per patient   SB right 20-30 75%     SB left 20-30 75%     Rotation right 5-10 75%     Rotation left 5-10 100%        Current: NA      3.  Pt will demonstrate improvement in bilateral hip strength to >5/5 in order to improve biomechanics during squats. Eval:    L(0-5) R (0-5) N/T   Hip Flexion (L1,2) 4- 4- []   Knee Extension (L3,4) 5 5 []   Ankle Dorsiflexion (L4) 5 5 []   Great Toe Extension (L5) 5 5 []   Ankle Plantarflexion (S1) 5 5 []   Knee Flexion (S1,2) 4+ 5 []   Gluteus Jarad 4- 4- []   Hip ABd 4- 4- []      Current: NA     4. Pt FOTO score will increase by >8 points to show improvement in subjective function. Eval:62  Current: will address at visit 5    Frequency / Duration: Patient to be seen 2-3 times per week for 6 weeks. Patient/ Caregiver education and instruction: Diagnosis, prognosis, self care and exercises   [x]  Plan of care has been reviewed with PORTIA Herbert, PT 12/11/2017 1:32 PM  _____________________________________________________________________  I certify that the above Therapy Services are being furnished while the patient is under my care. I agree with the treatment plan and certify that this therapy is necessary.     Physician's Signature:____________________  Date:__________Time:______    Please sign and return to [facility] at the [address], [facility], [address] [facility]       Phone: [phone]      Fax:  [phone]

## 2017-12-11 NOTE — PROGRESS NOTES
PT DAILY TREATMENT NOTE/LUMBAR EVAL 3-16    Patient Name: Kody Deshpande  Date:2017  : 1980  [x]  Patient  Verified  Payor: Oskar Dangelo / Plan: [address] [place] [address] [employer] / Product Type: HMO /    In time:11:05  Out time:11:40  Total Treatment Time (min): 35  Visit #: 1  16     Treatment Area: Low back pain [M54.5]  SUBJECTIVE  Pain Level (0-10 scale): 2/10  []constant []intermittent [x]improving []worsening []no change since onset    Any medication changes, allergies to medications, adverse drug reactions, diagnosis change, or new procedure performed?: [x] No    [] Yes (see summary sheet for update)  Subjective functional status/changes:     PLOF: Independent with all functional mobility. Crossfitter, . Limitations to PLOF: Patient has not returned to normal gym activities. Mechanism of Injury: Surgical  Current symptoms/Complaints: Patient is an active 41 y/o female who presents to outpatient PT s/p lumbar fusion at L4-L5 on10/25/17 by Dr. Betsy Rollins. Patient with history of Lurlene Fall and developed a significant spondylolisthesis causing numbness and tingling in left LE. Patient reports for a period of time she thought the issue was coming from her hip.  -Patient wore a lumbar brace for 5 weeks.   -Per surgeon patient has no limitations  -Patient would like to be able to return to higher level gym activities but understands she will not be able to lift heavy weights following this surgery but would like to return to lifting at lighter weights.   -Patient has had no numbness or tingling in left LE since surgery.   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   -Prior to surgery patient reports she did not have a consistent core strengthening program.     Previous Treatment/Compliance: Patient has had no therapy for low back. PMHx/Surgical Hx: Lurlene Fall, Reconstructive surgery to right shoulder 25 years ago.      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 Situation: Lives with Partner  Pt Goals: \"Get back to regular workout routine. \"  Barriers: [x]pain []financial []time []transportation []other  Motivation: Good  Substance use: []Alcohol []Tobacco []other:   FABQ Score: []low []elevate  Cognition: A & O x 4    Other:    OBJECTIVE/EXAMINATION        25 min [x]Eval                  []Re-Eval       10 min Therapeutic Exercise:  [x] See flow sheet : Patient education   Rationale: increase ROM, increase strength and improve coordination to improve the patients ability to perform daily activities with decreased pain and symptom levels          With   [x] TE   [] TA   [] neuro   [] other: Patient Education: [x] Review HEP    [] Progressed/Changed HEP based on:   [x] positioning   [x] body mechanics   [] transfers   [] heat/ice application    [x] other: POC, goals     Other Objective/Functional Measures: FOTO: 62    Physical Therapy Evaluation - Lumbar Spine ([facility])    General Health:  Red Flags Indicated? [] Yes    [x] No    Past History/Treatments: Patient reports she is active with crossfit and strengthening.        OBJECTIVE  Posture:  Lateral Shift: [] R    [] L     [] +  [x] -  Kyphosis: [] Increased [] Decreased   [x]  WNL  Lordosis:  [] Increased [x] Decreased   [] WNL  Pelvic symmetry: [] WNL    [x] Other: Left iliac crest high, left PSIS posterior compared to right    Gait:  [x] Normal     [] Abnormal:    Active Movements: [] N/A   [] Too acute   [] Other:  ROM % AROM Comments:pain, area   Forward flexion 40-60 100%  Poor reversal of lumbar lordosis, all hamstring mobility   Extension 20-30 25% \"hard stop\" per patient   SB right 20-30 75%    SB left 20-30 75%    Rotation right 5-10 75%    Rotation left 5-10 100%      Repeated Movements   -NA    Neuro Screen [x] WNL      Dural Mobility:  SLR Sitting: [] R    [] L    [] +    [x] -  @ (degrees):           Supine: [] R    [] L    [] +    [x] -  @ (degrees):   Slump Test: [] R [] L    [] +    [x] -  @ (degrees):   Prone Knee Bend: [] R    [] L    [] +    [x] -     Palpation  [] Min  [x] Mod  [] Severe    Location: Tightness and myofascial restrictions superior to incision, bilateral QL, erectors, piriformis bilaterally. Strength   L(0-5) R (0-5) N/T   Hip Flexion (L1,2) 4- 4- []   Knee Extension (L3,4) 5 5 []   Ankle Dorsiflexion (L4) 5 5 []   Great Toe Extension (L5) 5 5 []   Ankle Plantarflexion (S1) 5 5 []   Knee Flexion (S1,2) 4+ 5 []   Gluteus Jarad 4- 4- []   Hip ABd 4- 4- []     Special Tests    Sacroilliac:  Gaenslen's: [] R    [] L    [] +    [x] -     Compression: [] +    [x] -     Gapping:  [] +    [x] -     Thigh Thrust: [] R    [] L    [] +    [] -     Leg Length: [] +    [x] -   Position:    Crests: Higher on left. ASIS:    PSIS: Posterior on left            Hip: Alejandro:  [] R    [] L    [] +    [x] -     Scour:  [] R    [] L    [] +    [x] -     Piriformis: [] R    [] L    [] +    [x] -          Deficits: Tegan's: [] R    [] L    [] +    [x] -     Osmel: [x] R    [x] L    [x] +    [] -  (Positive for quad tightness)     - 28 degrees at left knee      -35 degrees at right knee     Hamstrings 90/90: Hyper flexible. Global Muscular Weakness:    Other tests/comments:  -Squat assessment: Quad dominant, decreased glute engagement. Pain Level (0-10 scale) post treatment: 2/10    ASSESSMENT/Changes in Function: Patient is an active 43 y/o female who presents to outpatient PT s/p L4-L5 lumbar fusion on 10/25/17 by Dr. Bo Boggs secondary to spondylolisthesis. Patient with history of Maryla Hadley and has been able to maintain function through body building and crossfit. Patient understands she will not be able to return to heavy lifting again but would like to return to her prior gym activities at a decreased weight level.  During evaluation, patient demonstrates quad dominance during squat assessment with poor engagement of glutes and hamstrings as well as poor quad flexibility. In addition, patient demonstrates overuse of global core musculature and poor activiation of local lumbar musculature. Patient will  benefit from skilled PT services to modify and progress therapeutic interventions, address functional mobility deficits, address ROM deficits, address strength deficits, analyze and address soft tissue restrictions, analyze and cue movement patterns, analyze and modify body mechanics/ergonomics and assess and modify postural abnormalities to attain remaining goals. [x]  See Plan of Care  []  See progress note/recertification  []  See Discharge Summary         Progress towards goals / Updated goals:  Short Term Goals: STG- To be accomplished in 3 week(s):  1. Pt will be independent with HEP to encourage prophylaxis. Eval:To be initiated next session  Current: NA    Long Term Goals: LTG- To be accomplished in 6-8 week(s):  1. Pt will demonstrate improvement in quad flexibility by > 10 degrees in maria elena test position in order to improve biomechanics during squats lunges. Eval:   -Left Knee:28 degrees    -Right knee: 35 degrees  Current: NA    2. Pt will demonstrate full pain free AROM of lumbar spine in order to return to PLOF. Eval:  ROM % AROM Comments:pain, area   Forward flexion 40-60 100%  Poor reversal of lumbar lordosis, all hamstring mobility   Extension 20-30 25% \"hard stop\" per patient   SB right 20-30 75%    SB left 20-30 75%    Rotation right 5-10 75%    Rotation left 5-10 100%      Current: NA     3. Pt will demonstrate improvement in bilateral hip strength to >5/5 in order to improve biomechanics during squats. Eval:   L(0-5) R (0-5) N/T   Hip Flexion (L1,2) 4- 4- []   Knee Extension (L3,4) 5 5 []   Ankle Dorsiflexion (L4) 5 5 []   Great Toe Extension (L5) 5 5 []   Ankle Plantarflexion (S1) 5 5 []   Knee Flexion (S1,2) 4+ 5 []   Gluteus Jarad 4- 4- []   Hip ABd 4- 4- []     Current: NA    4.   Pt FOTO score will increase by >8 points to show improvement in subjective function.   Eval:62  Current: will address at visit 5    PLAN  [x]  Upgrade activities as tolerated     [x]  Continue plan of care  []  Update interventions per flow sheet       []  Discharge due to:_  []  Other:_      Valeriano Sloan PT 12/11/2017  11:02 AM

## 2017-12-15 ENCOUNTER — HOSPITAL ENCOUNTER (OUTPATIENT)
Dept: PHYSICAL THERAPY | Age: 37
Discharge: HOME | End: 2017-12-15
Payer: COMMERCIAL

## 2017-12-15 PROCEDURE — 97112 NEUROMUSCULAR REEDUCATION: CPT | Performed by: PHYSICAL THERAPIST

## 2017-12-15 PROCEDURE — 97530 THERAPEUTIC ACTIVITIES: CPT | Performed by: PHYSICAL THERAPIST

## 2017-12-15 PROCEDURE — 97110 THERAPEUTIC EXERCISES: CPT | Performed by: PHYSICAL THERAPIST

## 2017-12-15 NOTE — PROGRESS NOTES
PT DAILY TREATMENT NOTE     Patient Name: Keitha Lesches  Date:12/15/2017  : 1980  [x]  Patient  Verified  Payor: Augusta Box / Plan: [employer] / Product Type: HMO /    In time:8:12  Out time:9:05  Total Treatment Time (min): 53  Visit #: 2 of 16    Treatment Area: Low back pain [M54.5]    SUBJECTIVE  Pain Level (0-10 scale): 2  Any medication changes, allergies to medications, adverse drug reactions, diagnosis change, or new procedure performed?: [x] No    [] Yes (see summary sheet for update)  Subjective functional status/changes:   [] No changes reported  \"I am just not used to feeling more stiff in my back. \"    OBJECTIVE        20 min Therapeutic Exercise:  [x] See flow sheet :   Rationale: increase ROM, increase strength and improve coordination to improve the patients ability to perform daily activities with decreased pain and symptom levels      8 min Therapeutic Activity:  [x]  See flow sheet :   Rationale: increase ROM and increase strength  to improve the patients ability to perform daily activities with decreased pain and symptom levels       25 min Neuromuscular Re-education:  [x]  See flow sheet : tactile cues provided during OOV activities   Rationale: increase strength, improve coordination and increase proprioception  to improve the patients ability to perform daily activities with decreased pain and symptom levels            With   [x] TE   [] TA   [] neuro   [] other: Patient Education: [x] Review HEP    [] Progressed/Changed HEP based on:   [] positioning   [] body mechanics   [] transfers   [] heat/ice application    [] other:      Other Objective/Functional Measures:   -Patient with pain in left SIJ  -increased mobility in left SIJ during standing marching assessment. Pain Level (0-10 scale) post treatment: 1/10    ASSESSMENT/Changes in Function: Patient required VC's to properly engage TA vs. Using valsalva maneuver.  Challenged by activities on [address] and fatigues easily during HS curls on ball    Patient will continue to benefit from skilled PT services to modify and progress therapeutic interventions, address functional mobility deficits, address ROM deficits, address strength deficits, analyze and address soft tissue restrictions, analyze and cue movement patterns, analyze and modify body mechanics/ergonomics and assess and modify postural abnormalities to attain remaining goals. []  See Plan of Care  []  See progress note/recertification  []  See Discharge Summary         Progress towards goals / Updated goals:  Short Term Goals: STG- To be accomplished in 3 week(s):  1.  Pt will be independent with HEP to encourage prophylaxis. Eval:To be initiated next session  Current: NA      Long Term Goals: LTG- To be accomplished in 6-8 week(s):  1.  Pt will demonstrate improvement in quad flexibility by > 10 degrees in maria elena test position in order to improve biomechanics during squats lunges. Eval:                        -ZSIJ Knee:28 degrees                         -Right knee: 35 degrees  Current: NA      2.  Pt will demonstrate full pain free AROM of lumbar spine in order to return to PLOF. Eval:  ROM % AROM Comments:pain, area   Forward flexion 40-60 100%  Poor reversal of lumbar lordosis, all hamstring mobility   Extension 20-30 25% \"hard stop\" per patient   SB right 20-30 75%      SB left 20-30 75%      Rotation right 5-10 75%      Rotation left 5-10 100%          Current: NA       3.  Pt will demonstrate improvement in bilateral hip strength to >5/5 in order to improve biomechanics during squats.    Eval:     L(0-5) R (0-5) N/T   Hip Flexion (L1,2) 4- 4- []   Knee Extension (L3,4) 5 5 []   Ankle Dorsiflexion (L4) 5 5 []   Great Toe Extension (L5) 5 5 []   Ankle Plantarflexion (S1) 5 5 []   Knee Flexion (S1,2) 4+ 5 []   Gluteus Jarad 4- 4- []   Hip ABd 4- 4- []       Current: NA      4.  Pt FOTO score will increase by >8 points to show improvement in subjective function.   Eval:62  Current: will address at visit 5      PLAN  [x]  Upgrade activities as tolerated     [x]  Continue plan of care  []  Update interventions per flow sheet       []  Discharge due to:_  []  Other:_      Mireille Rosas PT 12/15/2017  10:22 AM    Future Appointments  Date Time Provider [place]   12/19/2017 11:00 AM Irlanda Schmidtc [facility]   12/21/2017 11:30 AM Irlanda Mota [facility]   12/27/2017 2:30 PM Mireille Rosas PT [facility]   12/29/2017 10:00 AM Mireille Rosas PT [facility]   1/3/2018 9:00 AM Mireille Rosas PT [facility]   1/5/2018 10:00 AM Mireille Rosas PT [facility]   1/8/2018 12:00 PM Mireille Rosas PT [facility]   1/10/2018 9:30 AM Mireille Rosas PT [facility]

## 2017-12-19 ENCOUNTER — HOSPITAL ENCOUNTER (OUTPATIENT)
Dept: PHYSICAL THERAPY | Age: 37
Discharge: HOME | End: 2017-12-19
Payer: COMMERCIAL

## 2017-12-19 PROCEDURE — 97530 THERAPEUTIC ACTIVITIES: CPT

## 2017-12-19 PROCEDURE — 97110 THERAPEUTIC EXERCISES: CPT

## 2017-12-19 NOTE — PROGRESS NOTES
PT DAILY TREATMENT NOTE     Patient Name: Alejandro Soto  Date:2017  : 1980  [x]  Patient  Verified  Payor: Herlinda Soliman / Plan: [employer] HMO / Product Type: HMO /    In time:11:00  Out time:11:53  Total Treatment Time (min): 53  Visit #: 3 of 16    Treatment Area: Low back pain [M54.5]    SUBJECTIVE  Pain Level (0-10 scale): 2  Any medication changes, allergies to medications, adverse drug reactions, diagnosis change, or new procedure performed?: [x] No    [] Yes (see summary sheet for update)  Subjective functional status/changes:   [] No changes reported  \"My back still feels tight but not really painful. \"    OBJECTIVE  28 min Therapeutic Exercise:  [x] See flow sheet :   Rationale: increase ROM, increase strength and improve coordination to improve the patients ability to perform daily activities with decreased pain and symptom levels    25 min Therapeutic Activity:  [x]  See flow sheet :   Rationale: increase ROM, increase strength, improve coordination, improve balance and increase proprioception  to improve the patients ability to perform daily activities with decreased pain and symptom levels           With   [] TE   [] TA   [] neuro   [] other: Patient Education: [x] Review HEP    [] Progressed/Changed HEP based on:   [] positioning   [] body mechanics   [] transfers   [] heat/ice application    [] other:      Other Objective/Functional Measures:   Increased instability with lifting left LE with Oov exercises     Pain Level (0-10 scale) post treatment: 1    ASSESSMENT/Changes in Function: Increased challenge with use of ballon on last rep of . Plan to incorporate next visit. Continues to be challenged with Oov exercises especially with lifting LE.      Patient will continue to benefit from skilled PT services to modify and progress therapeutic interventions, address functional mobility deficits, address ROM deficits, address strength deficits, analyze and modify body mechanics/ergonomics and instruct in home and community integration to attain remaining goals. []  See Plan of Care  []  See progress note/recertification  []  See Discharge Summary         Progress towards goals / Updated goals:  Short Term Goals: STG- To be accomplished in 3 week(s):  1.  Pt will be independent with HEP to encourage prophylaxis. Eval:To be initiated next session  Current: Pt reports compliance       Long Term Goals: LTG- To be accomplished in 6-8 week(s):  1.  Pt will demonstrate improvement in quad flexibility by > 10 degrees in maria elena test position in order to improve biomechanics during squats lunges. Eval:                        -EGHQ Knee:28 degrees                         -Right knee: 35 degrees  Current: NA      2.  Pt will demonstrate full pain free AROM of lumbar spine in order to return to PLOF. Eval:  ROM % AROM Comments:pain, area   Forward flexion 40-60 100%  Poor reversal of lumbar lordosis, all hamstring mobility   Extension 20-30 25% \"hard stop\" per patient   SB right 20-30 75%      SB left 20-30 75%      Rotation right 5-10 75%      Rotation left 5-10 100%          Current: NA       3.  Pt will demonstrate improvement in bilateral hip strength to >5/5 in order to improve biomechanics during squats. Eval:     L(0-5) R (0-5) N/T   Hip Flexion (L1,2) 4- 4- []   Knee Extension (L3,4) 5 5 []   Ankle Dorsiflexion (L4) 5 5 []   Great Toe Extension (L5) 5 5 []   Ankle Plantarflexion (S1) 5 5 []   Knee Flexion (S1,2) 4+ 5 []   Gluteus Jarad 4- 4- []   Hip ABd 4- 4- []       Current: NA      4.  Pt FOTO score will increase by >8 points to show improvement in subjective function.   Eval:62  Current: will address at visit 5       PLAN  [x]  Upgrade activities as tolerated     [x]  Continue plan of care  []  Update interventions per flow sheet       []  Discharge due to:_  []  Other:_      Karyn Mota 12/19/2017  10:58 AM    Future Appointments  Date Time Provider Department [place]   12/19/2017 11:00 AM Irlanda Nguyen Remesic [facility]   12/21/2017 11:30 AM Irlandacrescencio Pastores Remesic [facility]   12/27/2017 2:30 PM Mireille Cullman, PT [facility]   12/29/2017 10:00 AM Mireille Cullman, PT [facility]   1/3/2018 9:00 AM Mireille Cullman, PT [facility]   1/5/2018 10:00 AM Mireille Cullman, PT [facility]   1/8/2018 12:00 PM Mireille Cullman, PT [facility]   1/10/2018 9:30 AM Mireille Cullman, PT [facility]

## 2017-12-21 ENCOUNTER — HOSPITAL ENCOUNTER (OUTPATIENT)
Dept: PHYSICAL THERAPY | Age: 37
Discharge: HOME | End: 2017-12-21
Payer: COMMERCIAL

## 2017-12-21 PROCEDURE — 97140 MANUAL THERAPY 1/> REGIONS: CPT

## 2017-12-21 PROCEDURE — 97530 THERAPEUTIC ACTIVITIES: CPT

## 2017-12-21 PROCEDURE — 97110 THERAPEUTIC EXERCISES: CPT

## 2017-12-21 NOTE — PROGRESS NOTES
PT DAILY TREATMENT NOTE     Patient Name: Rima Arevalo  Date:2017  : 1980  [x]  Patient  Verified  Payor: Chandni Real / Plan: [address] [employer] / Product Type: HMO /    In time:11:30  Out time:12:20  Total Treatment Time (min): 50  Visit #: 4 of 16    Treatment Area: Low back pain [M54.5]    SUBJECTIVE  Pain Level (0-10 scale): 0  Any medication changes, allergies to medications, adverse drug reactions, diagnosis change, or new procedure performed?: [x] No    [] Yes (see summary sheet for update)  Subjective functional status/changes:   [] No changes reported  \"I worked out yesterday without pain just tightness. \"    OBJECTIVE      20 min Therapeutic Exercise:  [x] See flow sheet :   Rationale: increase ROM, increase strength and improve coordination to improve the patients ability to perform daily activities with decreased pain and symptom levels    20 min Therapeutic Activity:  [x]  See flow sheet : Oov   Rationale: increase ROM, increase strength, improve coordination, improve balance and increase proprioception  to improve the patients ability to perform daily activities with decreased pain and symptom levels    10 min Manual Therapy:  STM to bilateral gluts and piriformis in prone with hip IR/ER, STM to bilateral QL    Rationale: decrease pain, increase ROM and increase tissue extensibility to perform daily activities with decreased pain and symptom levels            With   [] TE   [] TA   [] neuro   [] other: Patient Education: [x] Review HEP    [] Progressed/Changed HEP based on:   [] positioning   [] body mechanics   [] transfers   [] heat/ice application    [] other:      Other Objective/Functional Measures:   Increased tone in left gluts, decreased hip IR on right      Pain Level (0-10 scale) post treatment: 0    ASSESSMENT/Changes in Function: Pt reported decreased tightness in left glut following session.  Pt continues to be challenged with Qped exercises on Oov especially when stabilizing with right side. Increased fatigue with side planks on left with inability to hold hip abduction on that side. Patient will continue to benefit from skilled PT services to modify and progress therapeutic interventions, address functional mobility deficits, address strength deficits, analyze and address soft tissue restrictions, analyze and modify body mechanics/ergonomics and instruct in home and community integration to attain remaining goals. []  See Plan of Care  []  See progress note/recertification  []  See Discharge Summary         Progress towards goals / Updated goals:  Short Term Goals: STG- To be accomplished in 3 week(s):  1.  Pt will be independent with HEP to encourage prophylaxis. Eval:To be initiated next session  Current: Pt reports compliance       Long Term Goals: LTG- To be accomplished in 6-8 week(s):  1.  Pt will demonstrate improvement in quad flexibility by > 10 degrees in maria elena test position in order to improve biomechanics during squats lunges. Eval:                        -BZYK Knee:28 degrees                         -Right knee: 35 degrees  Current: NA      2.  Pt will demonstrate full pain free AROM of lumbar spine in order to return to PLOF. Eval:  ROM % AROM Comments:pain, area   Forward flexion 40-60 100%  Poor reversal of lumbar lordosis, all hamstring mobility   Extension 20-30 25% \"hard stop\" per patient   SB right 20-30 75%      SB left 20-30 75%      Rotation right 5-10 75%      Rotation left 5-10 100%          Current: NA       3.  Pt will demonstrate improvement in bilateral hip strength to >5/5 in order to improve biomechanics during squats.    Eval:     L(0-5) R (0-5) N/T   Hip Flexion (L1,2) 4- 4- []   Knee Extension (L3,4) 5 5 []   Ankle Dorsiflexion (L4) 5 5 []   Great Toe Extension (L5) 5 5 []   Ankle Plantarflexion (S1) 5 5 []   Knee Flexion (S1,2) 4+ 5 []   Gluteus Jarad 4- 4- []   Hip ABd 4- 4- []       Current: Unable to hold abduction with side plank on left       4.  Pt FOTO score will increase by >8 points to show improvement in subjective function.   Eval:62  Current: will address at visit 5      PLAN  [x]  Upgrade activities as tolerated     [x]  Continue plan of care  []  Update interventions per flow sheet       []  Discharge due to:_  []  Other:_      Dionicio Mccarthy 12/21/2017  11:33 AM    Future Appointments  Date Time Provider Juliette Ames   12/27/2017 2:30 PM Chyna Moreno, PT [facility]   12/29/2017 10:00 AM Chyna Moreno, PT [facility]   1/3/2018 9:00 AM Chyna Moreno, PT [facility]   1/5/2018 10:00 AM Chyna Moreno, PT [facility]   1/8/2018 12:00 PM Chyna Moreno, PT [facility]   1/10/2018 9:30 AM Chyna Moreno, PT [facility]

## 2017-12-27 ENCOUNTER — HOSPITAL ENCOUNTER (OUTPATIENT)
Dept: PHYSICAL THERAPY | Age: 37
Discharge: HOME | End: 2017-12-27
Payer: COMMERCIAL

## 2017-12-27 PROCEDURE — 97112 NEUROMUSCULAR REEDUCATION: CPT | Performed by: PHYSICAL THERAPIST

## 2017-12-27 PROCEDURE — 97530 THERAPEUTIC ACTIVITIES: CPT | Performed by: PHYSICAL THERAPIST

## 2017-12-27 PROCEDURE — 97140 MANUAL THERAPY 1/> REGIONS: CPT | Performed by: PHYSICAL THERAPIST

## 2017-12-27 PROCEDURE — 97110 THERAPEUTIC EXERCISES: CPT | Performed by: PHYSICAL THERAPIST

## 2017-12-27 NOTE — PROGRESS NOTES
PT DAILY TREATMENT NOTE     Patient Name: Michelle George  Date:2017  : 1980  [x]  Patient  Verified  Payor: Edilberto Casey / Plan: [address] [place], [place] [address] H[place] / Product Type: HMO /    In time:2:40  Out time:4:00  Total Treatment Time (min): 80  Visit #: 5 of 16    Treatment Area: Low back pain [M54.5]    SUBJECTIVE   Pain Level (0-10 scale): 1/10  Any medication changes, allergies to medications, adverse drug reactions, diagnosis change, or new procedure performed?: [x] No    [] Yes (see summary sheet for update)  Subjective functional status/changes:   [] No changes reported  \"I did some snatches and burpees; I just feel tight. \"    OBJECTIVE         24 min Therapeutic Exercise:  [x] See flow sheet :   Rationale: increase ROM, increase strength and improve coordination to improve the patients ability to perform daily activities with decreased pain and symptom levels      16 min Therapeutic Activity:  [x]  See flow sheet :   Rationale: increase ROM, increase strength and improve coordination  to improve the patients ability to perform daily activities with decreased pain and symptom levels       25 min Neuromuscular Re-education:  []  See flow sheet : OOV training with therapist tactile cues for feedback to properly engage diaphragm and TA   Rationale: increase ROM, increase strength and improve coordination  to improve the patients ability to perform daily activities with decreased pain and symptom levels      15 min Manual Therapy:  Trigger point release of left hip flexor, STM to left QL, paraspinals bilaterally and thoracolumbar fascia with use of therapeutic cupping.     Rationale: decrease pain, increase ROM and increase tissue extensibility to perform daily activities with decreased pain and symptom levels                With   [] TE   [] TA   [] neuro   [] other: Patient Education: [x] Review HEP    [x] Progressed/Changed HEP based on:   [] positioning   [] body mechanics   [] transfers   [] heat/ice application    [] other:      Other Objective/Functional Measures:   -Improvement noted in SLR and marching on Oov  -Difficulty with rotating from hips during perpendicular ball toss  -Trigger points in left hip flexor. Pain Level (0-10 scale) post treatment: 0/10    ASSESSMENT/Changes in Function: Decreased pain in anterior hip and flank following direct release of left hip flexor. Patient will continue to benefit from skilled PT services to modify and progress therapeutic interventions, address functional mobility deficits, address ROM deficits, address strength deficits, analyze and address soft tissue restrictions, analyze and cue movement patterns, analyze and modify body mechanics/ergonomics and assess and modify postural abnormalities to attain remaining goals. []  See Plan of Care  []  See progress note/recertification  []  See Discharge Summary         Progress towards goals / Updated goals:  Short Term Goals: STG- To be accomplished in 3 week(s):  1.  Pt will be independent with HEP to encourage prophylaxis. Eval:To be initiated next session  Current: Pt reports compliance       Long Term Goals: LTG- To be accomplished in 6-8 week(s):  1.  Pt will demonstrate improvement in quad flexibility by > 10 degrees in maria elena test position in order to improve biomechanics during squats lunges. Eval:                        -IEEI Knee:28 degrees                         -Right knee: 35 degrees  Current: NA      2.  Pt will demonstrate full pain free AROM of lumbar spine in order to return to PLOF.   Eval:  ROM % AROM Comments:pain, area   Forward flexion 40-60 100%  Poor reversal of lumbar lordosis, all hamstring mobility   Extension 20-30 25% \"hard stop\" per patient   SB right 20-30 75%      SB left 20-30 75%      Rotation right 5-10 75%      Rotation left 5-10 100%          Current: NA       3.  Pt will demonstrate improvement in bilateral hip strength to >5/5 in order to improve biomechanics during squats. Eval:     L(0-5) R (0-5) N/T   Hip Flexion (L1,2) 4- 4- []   Knee Extension (L3,4) 5 5 []   Ankle Dorsiflexion (L4) 5 5 []   Great Toe Extension (L5) 5 5 []   Ankle Plantarflexion (S1) 5 5 []   Knee Flexion (S1,2) 4+ 5 []   Gluteus Jarad 4- 4- []   Hip ABd 4- 4- []       Current: Unable to hold abduction with side plank on left       4.  Pt FOTO score will increase by >8 points to show improvement in subjective function.   Eval:62  Current: will address at visit 5      PLAN  [x]  Upgrade activities as tolerated     [x]  Continue plan of care  []  Update interventions per flow sheet       []  Discharge due to:_  []  Other:_      Steven Anguiano PT 12/27/2017  3:10 PM    Future Appointments  Date Time Provider Juliette Ames   12/29/2017 10:00 AM JUAN Lacey [facility]   1/3/2018 9:00 AM Steven Anguiano PT [facility]   1/5/2018 10:00 AM Steven Anguiano PT [facility]   1/8/2018 12:00 PM Steven Anguiano PT [facility]   1/10/2018 9:30 AM JUAN Lacey [facility]

## 2017-12-29 ENCOUNTER — HOSPITAL ENCOUNTER (OUTPATIENT)
Dept: PHYSICAL THERAPY | Age: 37
Discharge: HOME | End: 2017-12-29
Payer: COMMERCIAL

## 2017-12-29 PROCEDURE — 97140 MANUAL THERAPY 1/> REGIONS: CPT | Performed by: PHYSICAL THERAPIST

## 2017-12-29 PROCEDURE — 97110 THERAPEUTIC EXERCISES: CPT | Performed by: PHYSICAL THERAPIST

## 2017-12-29 PROCEDURE — 97530 THERAPEUTIC ACTIVITIES: CPT | Performed by: PHYSICAL THERAPIST

## 2017-12-29 PROCEDURE — 97112 NEUROMUSCULAR REEDUCATION: CPT | Performed by: PHYSICAL THERAPIST

## 2017-12-29 NOTE — PROGRESS NOTES
PT DAILY TREATMENT NOTE     Patient Name: Bonnie Neumann  Date:2017  : 1980  [x]  Patient  Verified  Payor: Celestino Cuevas / Plan: [employer] / Product Type: HMO /    In time:10:06  Out time:11:24  Total Treatment Time (min): 66  Visit #: 6 of 16    Treatment Area: Low back pain [M54.5]    SUBJECTIVE  Pain Level (0-10 scale): 0  Any medication changes, allergies to medications, adverse drug reactions, diagnosis change, or new procedure performed?: [x] No    [] Yes (see summary sheet for update)  Subjective functional status/changes:   [] No changes reported  \"I am just afraid of doing dead lifts again because of the pressure on my spine. \"    OBJECTIVE       28 min Therapeutic Exercise:  [x] See flow sheet :   Rationale: increase ROM, increase strength and improve coordination to improve the patients ability to perform daily activities with decreased pain and symptom levels      10 min Therapeutic Activity:  [x]  See flow sheet :   Rationale: increase ROM, increase strength and improve coordination  to improve the patients ability to perform daily activities with decreased pain and symptom levels       25 min Neuromuscular Re-education:  [x]  See flow sheet : OOV training with tactile cues from therapist to properly engage TA. Rationale: increase ROM, increase strength and improve coordination  to improve the patients ability to perform daily activities with decreased pain and symptom levels      15 min Manual Therapy:  Direct release to left hip flexor, left piriformis and left QL.     Rationale: decrease pain, increase ROM and increase tissue extensibility to perform daily activities with decreased pain and symptom levels                With   [] TE   [] TA   [] neuro   [] other: Patient Education: [x] Review HEP    [x] Progressed/Changed HEP based on:   [] positioning   [] body mechanics   [] transfers   [] heat/ice application    [] other:      Other Objective/Functional Measures: FOTO: 72     Pain Level (0-10 scale) post treatment: 1    ASSESSMENT/Changes in Function: Patient reports tightness in left lumbar spine during sit ups. Improvement noted in subjective function. Overall tolerating current programming. Patient will continue to benefit from skilled PT services to modify and progress therapeutic interventions, address functional mobility deficits, address ROM deficits, address strength deficits, analyze and address soft tissue restrictions, analyze and cue movement patterns, analyze and modify body mechanics/ergonomics and assess and modify postural abnormalities to attain remaining goals. []  See Plan of Care  []  See progress note/recertification  []  See Discharge Summary         Progress towards goals / Updated goals:  Short Term Goals: STG- To be accomplished in 3 week(s):  1.  Pt will be independent with HEP to encourage prophylaxis. Eval:To be initiated next session  Current: Pt reports compliance       Long Term Goals: LTG- To be accomplished in 6-8 week(s):  1.  Pt will demonstrate improvement in quad flexibility by > 10 degrees in maria elena test position in order to improve biomechanics during squats lunges. Eval:                        -VRBF Knee:28 degrees                         -Right knee: 35 degrees  Current: NA      2.  Pt will demonstrate full pain free AROM of lumbar spine in order to return to PLOF. Eval:  ROM % AROM Comments:pain, area   Forward flexion 40-60 100%  Poor reversal of lumbar lordosis, all hamstring mobility   Extension 20-30 25% \"hard stop\" per patient   SB right 20-30 75%      SB left 20-30 75%      Rotation right 5-10 75%      Rotation left 5-10 100%          Current:   Extension: 50% with hard stop per patient  Full AROM with rotation and side bend.       3.  Pt will demonstrate improvement in bilateral hip strength to >5/5 in order to improve biomechanics during squats.    Eval:     L(0-5) R (0-5) N/T   Hip Flexion (L1,2) 4- 4- []   Knee Extension (L3,4) 5 5 []   Ankle Dorsiflexion (L4) 5 5 []   Great Toe Extension (L5) 5 5 []   Ankle Plantarflexion (S1) 5 5 []   Knee Flexion (S1,2) 4+ 5 []   Gluteus Jarad 4- 4- []   Hip ABd 4- 4- []       Current: Unable to hold abduction with side plank on left       4.  Pt FOTO score will increase by >8 points to show improvement in subjective function.   Eval:62  Current: 72 -MET      PLAN  [x]  Upgrade activities as tolerated     [x]  Continue plan of care  []  Update interventions per flow sheet       []  Discharge due to:_  []  Other:_      Elvie Villa PT 12/29/2017  10:10 AM    Future Appointments  Date Time Provider Juliette Ames   1/3/2018 9:00 AM JUAN Miller [facility]   1/5/2018 10:00 AM JUAN Miller [facility]   1/8/2018 12:00 PM JUAN Miller [facility]   1/10/2018 9:30 AM JUAN Miller [facility]

## 2018-01-03 ENCOUNTER — APPOINTMENT (OUTPATIENT)
Dept: PHYSICAL THERAPY | Age: 38
End: 2018-01-03
Payer: COMMERCIAL

## 2018-01-05 ENCOUNTER — APPOINTMENT (OUTPATIENT)
Dept: PHYSICAL THERAPY | Age: 38
End: 2018-01-05
Payer: COMMERCIAL

## 2018-01-08 ENCOUNTER — HOSPITAL ENCOUNTER (OUTPATIENT)
Dept: PHYSICAL THERAPY | Age: 38
Discharge: HOME | End: 2018-01-08
Payer: COMMERCIAL

## 2018-01-08 PROCEDURE — 97140 MANUAL THERAPY 1/> REGIONS: CPT | Performed by: PHYSICAL THERAPIST

## 2018-01-08 PROCEDURE — 97530 THERAPEUTIC ACTIVITIES: CPT | Performed by: PHYSICAL THERAPIST

## 2018-01-08 PROCEDURE — 97110 THERAPEUTIC EXERCISES: CPT | Performed by: PHYSICAL THERAPIST

## 2018-01-08 NOTE — PROGRESS NOTES
PT DAILY TREATMENT NOTE     Patient Name: Laurent Chappell  Date:2018  : 1980  [x]  Patient  Verified  Payor: Mika Wells / Plan: [employer] / Product Type: Commerical /    In time:12:00  Out time:1:15  Total Treatment Time (min): 75  Visit #: 7 of 16    Treatment Area: Low back pain [M54.5]    SUBJECTIVE  Pain Level (0-10 scale): 1/10  Any medication changes, allergies to medications, adverse drug reactions, diagnosis change, or new procedure performed?: [x] No    [] Yes (see summary sheet for update)  Subjective functional status/changes:   [] No changes reported  \"I did a lot of sitting over the weekend which isn't normal for me so I was pretty sore sitting on the couch. \"    OBJECTIVE        45 min Therapeutic Exercise:  [x] See flow sheet :   Rationale: increase ROM, increase strength and improve coordination to improve the patients ability to perform daily activities with decreased pain and symptom levels    15 min Therapeutic Activity:  [x]  See flow sheet :   Rationale: increase ROM, increase strength and improve coordination  to improve the patients ability to perform daily activities with decreased pain and symptom levels      15 min Manual Therapy:  Direct release to left hip flexor in supine, direct release to left piriformis in prone with hip IR and ER, STM to to left QL, erectors, lumbar paraspinals. Rationale: decrease pain, increase ROM and increase tissue extensibility to perform daily activities with decreased pain and symptom levels            With   [] TE   [] TA   [] neuro   [] other: Patient Education: [x] Review HEP    [x] Progressed/Changed HEP based on:   [] positioning   [] body mechanics   [] transfers   [] heat/ice application    [] other:      Other Objective/Functional Measures:   -Front squat assessment: Good form, neutral spine.   -Back squat assessment: Good form, neutral spine.      Pain Level (0-10 scale) post treatment: 0/10    ASSESSMENT/Changes in Function: Patient is an active 41 y/o female who presents to outpatient PT s/p L4-L5 lumbar fusion on 10/25/17 by Dr. Mirtha Rodriguez secondary to spondylolisthesis. Patient with history of Quinton Muss and has been able to maintain function through body building and crossfit. During evaluation, patient demonstrated quad dominance during squat assessment with poor engagement of glutes and hamstrings as well as poor quad flexibility. In addition, patient demonstrates overuse of global core musculature and poor activiation of local lumbar musculature. Patient has attended 7 PT sessions in order to initiate core strengthening program as well as education on proper engagement of glutes and hamstrings during functional lifts including front squats and back squats. Patient very self aware of body mechanics and has made good progress towards goals. Continues to report stiffness and tightness near incision site and in left QL and hip flexor however this is also improving. Patient would benefit from 3-4 more sessions to continue working on tissue mobility and core strengthening program.        []  See Plan of Care  [x]  See progress note/recertification  []  See Discharge Summary         Progress towards goals / Updated goals:  Short Term Goals: STG- To be accomplished in 3 week(s):  1.  Pt will be independent with HEP to encourage prophylaxis. Eval:To be initiated next session  Current: Pt reports compliance -MET      Long Term Goals: LTG- To be accomplished in 6-8 week(s):  1.  Pt will demonstrate improvement in quad flexibility by > 10 degrees in maria elena test position in order to improve biomechanics during squats lunges. Eval:                        -EPHX Knee:28 degrees                         -Right knee: 35 degrees  Current:               -Left Knee:32 degrees                         -Right knee: 35 degrees     2.  Pt will demonstrate full pain free AROM of lumbar spine in order to return to PLOF.   Eval:  ROM % AROM Comments:pain, area   Forward flexion 40-60 100%  Poor reversal of lumbar lordosis, all hamstring mobility   Extension 20-30 25% \"hard stop\" per patient   SB right 20-30 75%      SB left 20-30 75%      Rotation right 5-10 75%      Rotation left 5-10 100%          Current:   Extension: 50% with hard stop per patient  Full AROM with rotation and side bend. -PROGRESSING      3.  Pt will demonstrate improvement in bilateral hip strength to >5/5 in order to improve biomechanics during squats. Eval:     L(0-5) R (0-5) N/T   Hip Flexion (L1,2) 4- 4- []   Knee Extension (L3,4) 5 5 []   Ankle Dorsiflexion (L4) 5 5 []   Great Toe Extension (L5) 5 5 []   Ankle Plantarflexion (S1) 5 5 []   Knee Flexion (S1,2) 4+ 5 []   Gluteus Jarad 4- 4- []   Hip ABd 4- 4- []        Current: Unable to hold abduction with side plank on left -PROGRESSING      4.  Pt FOTO score will increase by >8 points to show improvement in subjective function.   Eval:62  Current: 72 -MET      PLAN  [x]  Upgrade activities as tolerated     [x]  Continue plan of care  []  Update interventions per flow sheet       []  Discharge due to:_  []  Other:_      Chung Rae, PT 1/8/2018  2:17 PM    Future Appointments  Date Time Provider Juliette Ames   1/17/2018 11:00 AM Jim Gabriel PTA [facility]

## 2018-01-09 NOTE — PROGRESS NOTES
[facility] at the [address], [place], [address] [facility]  Phone: [phone]      Fax:  [phone]    Progress Note  Patient name: Amparo Salmeron Start of Care: 2017   Referral source: Som Brown MD : 1980                         Medical Diagnosis: Low back pain [M54.5] Onset Date:10/25/17                         Treatment Diagnosis: Low back pain, Dwight Snellen   Prior Hospitalization: see medical history Provider#: [id number]   Medications: Verified on Patient summary List    Comorbidities: Romain Snellen   Prior Level of Function: Independent with all functional mobility. ilan Condoner. Limitations to PLOF: Patient has not returned to normal gym activities. Visits from Start of Care: 7    Missed Visits: 0    Progress towards goals / Updated goals:  Short Term Goals: STG- To be accomplished in 3 week(s):  1.  Pt will be independent with HEP to encourage prophylaxis. Eval:To be initiated next session  Current: Pt reports compliance -MET      Long Term Goals: LTG- To be accomplished in 6-8 week(s):  1.  Pt will demonstrate improvement in quad flexibility by > 10 degrees in maria elena test position in order to improve biomechanics during squats lunges. Eval:                        -HEUT Knee:28 degrees                         -Right knee: 35 degrees  Current:                                    -Left Knee:32 degrees                         -Right knee: 35 degrees      2.  Pt will demonstrate full pain free AROM of lumbar spine in order to return to PLOF.   Eval:  ROM % AROM Comments:pain, area   Forward flexion 40-60 100%  Poor reversal of lumbar lordosis, all hamstring mobility   Extension 20-30 25% \"hard stop\" per patient   SB right 20-30 75%      SB left 20-30 75%      Rotation right 5-10 75%      Rotation left 5-10 100%          Current:   Extension: 50% with hard stop per patient  Full AROM with rotation and side bend.-PROGRESSING      3.  Pt will demonstrate improvement in bilateral hip strength to >5/5 in order to improve biomechanics during squats. Eval:     L(0-5) R (0-5) N/T   Hip Flexion (L1,2) 4- 4- []   Knee Extension (L3,4) 5 5 []   Ankle Dorsiflexion (L4) 5 5 []   Great Toe Extension (L5) 5 5 []   Ankle Plantarflexion (S1) 5 5 []   Knee Flexion (S1,2) 4+ 5 []   Gluteus Jarad 4- 4- []   Hip ABd 4- 4- []       Current: Unable to hold abduction with side plank on left -PROGRESSING      4.  Pt FOTO score will increase by >8 points to show improvement in subjective function. Eval:62  Current: 72 -MET    Key Functional Changes: Mariela Petty is an active 41 y/o female who presents to outpatient PT s/p L4-L5 lumbar fusion on 10/25/17 by Dr. Berhane Magdaleno secondary to spondylolisthesis. Patient with history of [employer] [place] and has been able to maintain function through body building and crossfit. During evaluation, patient demonstrated quad dominance during squat assessment with poor engagement of glutes and hamstrings as well as poor quad flexibility. In addition, patient demonstrates overuse of global core musculature and poor activiation of local lumbar musculature. Patient has attended 7 PT sessions in order to initiate core strengthening program as well as education on proper engagement of glutes and hamstrings during functional lifts including front squats and back squats. Patient very self aware of body mechanics and has made good progress towards goals.      Continues to report stiffness and tightness near incision site and in left QL and hip flexor however this is also improving.  Patient would benefit from 3-4 more sessions to continue working on tissue mobility and core strengthening program.     Updated Goals: to be achieved in 2 weeks:   Continue  ASSESSMENT/RECOMMENDATIONS:  [x]Continue therapy per initial plan/protocol at a frequency of  2 x per week for 2 weeks  []Continue therapy with the following recommended changes:_____________________      _____________________________________________________________________  []Discontinue therapy progressing towards or have reached established goals  []Discontinue therapy due to lack of appreciable progress towards goals  []Discontinue therapy due to lack of attendance or compliance  []Await Physician's recommendations/decisions regarding therapy  []Other:________________________________________________________________    Thank you for this referral.   China Thakur, PT 1/9/2018 7:36 AM

## 2018-01-10 ENCOUNTER — APPOINTMENT (OUTPATIENT)
Dept: PHYSICAL THERAPY | Age: 38
End: 2018-01-10
Payer: COMMERCIAL

## 2018-01-17 ENCOUNTER — HOSPITAL ENCOUNTER (OUTPATIENT)
Dept: PHYSICAL THERAPY | Age: 38
Discharge: HOME | End: 2018-01-17
Payer: COMMERCIAL

## 2018-01-17 PROCEDURE — 97112 NEUROMUSCULAR REEDUCATION: CPT

## 2018-01-17 PROCEDURE — 97110 THERAPEUTIC EXERCISES: CPT

## 2018-01-17 PROCEDURE — 97140 MANUAL THERAPY 1/> REGIONS: CPT

## 2018-01-17 NOTE — PROGRESS NOTES
PT DAILY TREATMENT NOTE     Patient Name: Alyse Riding  Date:2018  : 1980  [x]  Patient  Verified  Payor: Michelle Plummer / Plan: [place] [employer]  [facility]IT[facility] PT / Product Type: Commerical /    In time:1106  Out time:1200  Total Treatment Time (min): 54  Visit #: 8 of 11    Treatment Area: Low back pain [M54.5]    SUBJECTIVE  Pain Level (0-10 scale): 0/10  Any medication changes, allergies to medications, adverse drug reactions, diagnosis change, or new procedure performed?: [x] No    [] Yes (see summary sheet for update)  Subjective functional status/changes:   [] No changes reported  I just got over the flu so I have not worked out in a week. I see the MD tomorrow.     OBJECTIVE    Modality rationale:    Min Type Additional Details    [] Estim:  []Unatt       []IFC  []Premod                        []Other:  []w/ice   []w/heat  Position:  Location:    [] Estim: []Att    []TENS instruct  []NMES                    []Other:  []w/US   []w/ice   []w/heat  Position:  Location:    []  Traction: [] Cervical       []Lumbar                       [] Prone          []Supine                       []Intermittent   []Continuous Lbs:  [] before manual  [] after manual    []  Ultrasound: []Continuous   [] Pulsed                           []1MHz   []3MHz W/cm2:  Location:    []  Iontophoresis with dexamethasone         Location: [] Take home patch   [] In clinic    []  Ice     []  heat  []  Ice massage  []  Laser   []  Anodyne Position:  Location:    []  Laser with stim  []  Other:  Position:  Location:    []  Vasopneumatic Device Pressure:       [] lo [] med [] hi   Temperature: [] lo [] med [] hi   [] Skin assessment post-treatment:  []intact []redness- no adverse reaction    []redness - adverse reaction:      min []Eval                  []Re-Eval       19 min Therapeutic Exercise:  [x] See flow sheet :   Rationale: increase ROM, increase strength and improve coordination to improve the patients ability to normalize function and ready for discharge     min Therapeutic Activity:  []  See flow sheet :        26 min Neuromuscular Re-education:  [x]  See flow sheet :OOV in supine, quadruped and sidelying   Rationale: increase strength, improve coordination and increase proprioception  to improve the patients ability to normalize function    10 min Manual Therapy:  DTM left QL and pirformis, and T/S through L/S paraspinals in prone   Rationale: increase tissue extensibility and decrease trigger points to normalize function     min Gait Training:  ___ feet with ___ device on level surfaces with ___ level of assist   Rationale: With   [] TE   [] TA   [] neuro   [] other: Patient Education: [x] Review HEP    [] Progressed/Changed HEP based on:   [] positioning   [] body mechanics   [] transfers   [] heat/ice application    [] other:      Other Objective/Functional Measures:     Pain Level (0-10 scale) post treatment: 0/10    ASSESSMENT/Changes in Function: Pt [id number]% improvements since [facility] and remains motivated with self monitored exercise. Patient will continue to benefit from skilled PT services to modify and progress therapeutic interventions, address ROM deficits, address strength deficits, analyze and address soft tissue restrictions, analyze and cue movement patterns, analyze and modify body mechanics/ergonomics and instruct in home and community integration to attain remaining goals. []  See Plan of Care  [x]  See progress note/recertification  []  See Discharge Summary         Progress towards goals / Updated goals:  Long Term Goals: LTG- To be accomplished in 6-8 week(s):  1.  Pt will demonstrate improvement in quad flexibility by > 10 degrees in maria elena test position in order to improve biomechanics during squats lunges.   Eval:                        -OZVV Knee:28 degrees                         -Right knee: 35 degrees  Status at PN:                         -GQGT Knee:32 degrees                       -Right knee: 35 degrees   Current: no change      2.  Pt will demonstrate full pain free AROM of lumbar spine in order to return to PLOF. Eval:  ROM % AROM Comments:pain, area   Forward flexion 40-60 100%  Poor reversal of lumbar lordosis, all hamstring mobility   Extension 20-30 25% \"hard stop\" per patient   SB right 20-30 75%      SB left 20-30 75%      Rotation right 5-10 75%      Rotation left 5-10 100%          Status at PN:   Extension: 50% with hard stop per patient  Full AROM with rotation and side bend. -PROGRESSING  Current: progressing      3.  Pt will demonstrate improvement in bilateral hip strength to >5/5 in order to improve biomechanics during squats. Eval:     L(0-5) R (0-5) N/T   Hip Flexion (L1,2) 4- 4- []   Knee Extension (L3,4) 5 5 []   Ankle Dorsiflexion (L4) 5 5 []   Great Toe Extension (L5) 5 5 []   Ankle Plantarflexion (S1) 5 5 []   Knee Flexion (S1,2) 4+ 5 []   Gluteus Jarad 4- 4- []   Hip ABd 4- 4- []       Status at PN: Unable to hold abduction with side plank on left -PROGRESSING  Current: progressing          PLAN  [x]  Upgrade activities as tolerated     [x]  Continue plan of care  []  Update interventions per flow sheet       []  Discharge due to:_  [x]  Other:3 additional visits to ready for  discharge_      Sridhar Click, PTA 1/17/2018  11:05 AM    No future appointments.

## 2018-01-31 ENCOUNTER — HOSPITAL ENCOUNTER (OUTPATIENT)
Dept: PHYSICAL THERAPY | Age: 38
Discharge: HOME | End: 2018-01-31
Payer: COMMERCIAL

## 2018-01-31 PROCEDURE — 97530 THERAPEUTIC ACTIVITIES: CPT | Performed by: PHYSICAL THERAPIST

## 2018-01-31 PROCEDURE — 97140 MANUAL THERAPY 1/> REGIONS: CPT | Performed by: PHYSICAL THERAPIST

## 2018-01-31 PROCEDURE — 97110 THERAPEUTIC EXERCISES: CPT | Performed by: PHYSICAL THERAPIST

## 2018-01-31 NOTE — PROGRESS NOTES
PT DAILY TREATMENT NOTE     Patient Name: Geovany King  Date:2018  : 1980  [x]  Patient  Verified  Payor: Randolph Monahan / [place] [place] [employer]  [facility] / Product Type: Commerical /    In time:12:00  Out time:1:12  Total Treatment Time (min): 72  Visit #: 9 of 16    Treatment Area: Low back pain [M54.5]    SUBJECTIVE  Pain Level (0-10 scale): 0/10  Any medication changes, allergies to medications, adverse drug reactions, diagnosis change, or new procedure performed?: [x] No    [] Yes (see summary sheet for update)  Subjective functional status/changes:   [] No changes reported  \"I have been trying to increase my back squats and front squats but the left QL always tightens up. \"    OBJECTIVE        22 min Therapeutic Exercise:  [x] See flow sheet :   Rationale: increase ROM, increase strength and improve coordination to improve the patients ability to perform daily activities with decreased pain and symptom levels      25 min Therapeutic Activity:  [x]  See flow sheet :   Rationale: increase ROM, increase strength and improve coordination  to improve the patients ability to perform daily activities with decreased pain and symptom levels         25 min Manual Therapy:  Ischemic release to right piriformis, right QL, right hip flexor. Left TFL, left pectineus and left QL. MFR to thoracolumbar fascia.     Rationale: decrease pain, increase ROM and increase tissue extensibility to perform daily activities with decreased pain and symptom levels           With   [x] TE   [] TA   [] neuro   [] other: Patient Education: [x] Review HEP    [] Progressed/Changed HEP based on:   [] positioning   [] body mechanics   [] transfers   [] heat/ice application    [] other:      Other Objective/Functional Measures:   -Left gross hip strength: 4/5  -Right gross hip strength: 4-/5     Pain Level (0-10 scale) post treatment: 0/10    ASSESSMENT/Changes in Function: Patient demonstrates weakness in right  glute compared to left which is leading to compensation in left QL. Advised patient to focus on lateral step ups, lunges and single leg glute bridges on right side to improve strength. Patient will continue to benefit from skilled PT services to modify and progress therapeutic interventions, address functional mobility deficits, address ROM deficits, address strength deficits, analyze and address soft tissue restrictions, analyze and cue movement patterns, analyze and modify body mechanics/ergonomics and assess and modify postural abnormalities to attain remaining goals. []  See Plan of Care  []  See progress note/recertification  []  See Discharge Summary         Progress towards goals / Updated goals:  Long Term Goals: LTG- To be accomplished in 6-8 week(s):  1.  Pt will demonstrate improvement in quad flexibility by > 10 degrees in maria elena test position in order to improve biomechanics during squats lunges. Eval:                        -MQQQ Knee:28 degrees                         -Right knee: 35 degrees  Status at PN:                         -Left Knee:32 degrees                         -Right knee: 35 degrees   Current: no change      2.  Pt will demonstrate full pain free AROM of lumbar spine in order to return to PLOF. Eval:  ROM % AROM Comments:pain, area   Forward flexion 40-60 100%  Poor reversal of lumbar lordosis, all hamstring mobility   Extension 20-30 25% \"hard stop\" per patient   SB right 20-30 75%      SB left 20-30 75%      Rotation right 5-10 75%      Rotation left 5-10 100%          Status at PN:   Extension: 50% with hard stop per patient  Full AROM with rotation and side bend. -PROGRESSING  Current: progressing      3.  Pt will demonstrate improvement in bilateral hip strength to >5/5 in order to improve biomechanics during squats.    Eval:     L(0-5) R (0-5) N/T   Hip Flexion (L1,2) 4- 4- []   Knee Extension (L3,4) 5 5 []   Ankle Dorsiflexion (L4) 5 5 []   Great Toe Extension (L5) 5 5 []   Ankle Plantarflexion (S1) 5 5 []   Knee Flexion (S1,2) 4+ 5 []   Gluteus Jarad 4- 4- []   Hip ABd 4- 4- []       Status at PN: Unable to hold abduction with side plank on left -PROGRESSING  Current: Right glute strength: 4- /5                Left glute strength: 4/5      PLAN  [x]  Upgrade activities as tolerated     [x]  Continue plan of care  []  Update interventions per flow sheet       []  Discharge due to:_  []  Other:_      Tate Warner PT 1/31/2018  1:35 PM    Future Appointments  Date Time Provider [place] Ames   2/7/2018 9:00 AM JUAN Hunter [facility]   2/14/2018 9:00 AM JUAN Hunter [facility]

## 2018-02-07 ENCOUNTER — HOSPITAL ENCOUNTER (OUTPATIENT)
Dept: PHYSICAL THERAPY | Age: 38
Discharge: HOME | End: 2018-02-07
Payer: COMMERCIAL

## 2018-02-07 PROCEDURE — 97140 MANUAL THERAPY 1/> REGIONS: CPT | Performed by: PHYSICAL THERAPIST

## 2018-02-07 PROCEDURE — 97530 THERAPEUTIC ACTIVITIES: CPT | Performed by: PHYSICAL THERAPIST

## 2018-02-07 PROCEDURE — 97110 THERAPEUTIC EXERCISES: CPT | Performed by: PHYSICAL THERAPIST

## 2018-02-07 NOTE — PROGRESS NOTES
PT DAILY TREATMENT NOTE     Patient Name: Krupa Orellana  Date:2018  : 1980  [x]  Patient  Verified  Payor: Nolvia Huerta / [place]: [place] [employer]  [facility] Product Type: Com[facility] /    In time:9:00  Out time:10:00  Total Treatment Time (min): 60  Visit #: 10 of 16    Treatment Area: Low back pain [M54.5]    SUBJECTIVE  Pain Level (0-10 scale): 0/10  Any medication changes, allergies to medications, adverse drug reactions, diagnosis change, or new procedure performed?: [x] No    [] Yes (see summary sheet for update)  Subjective functional status/changes:   [] No changes reported  \"I did a workout and it felt tight on both sides but seems to be a little better; no real pain just tightness. \"    OBJECTIVE    15 min Therapeutic Exercise:  [x] See flow sheet :   Rationale: increase ROM, increase strength and improve coordination to improve the patients ability to perform daily activities with decreased pain and symptom levels      10 min Therapeutic Activity:  [x]  See flow sheet :   Rationale: increase ROM, increase strength and improve coordination  to improve the patients ability to perform daily activities with decreased pain and symptom levels      35 min Manual Therapy:  MET 2 x to correct left on right backward sacral torsion, MET to correct left upslip, STM and myofascial release to lumbar paraspinals, erectors with use of therapeutic cupping and IASTM.     Rationale: decrease pain, increase ROM and increase tissue extensibility to perform daily activities with decreased pain and symptom levels                With   [] TE   [] TA   [] neuro   [] other: Patient Education: [x] Review HEP    [] Progressed/Changed HEP based on:   [] positioning   [] body mechanics   [] transfers   [] heat/ice application    [] other:      Other Objective/Functional Measures:   -Left on right backward sacral torsion  -Left upslip     Pain Level (0-10 scale) post treatment: 0/10    ASSESSMENT/Changes in Function: Patient reported decreased \"tightness\" in sacrum and QL following today's session. Upslip on left side corrected with MET. Patient will continue to benefit from skilled PT services to modify and progress therapeutic interventions, address functional mobility deficits, address ROM deficits, address strength deficits, analyze and address soft tissue restrictions, analyze and cue movement patterns and analyze and modify body mechanics/ergonomics to attain remaining goals. []  See Plan of Care  []  See progress note/recertification  []  See Discharge Summary         Progress towards goals / Updated goals:  Long Term Goals: LTG- To be accomplished in 6-8 week(s):  1.  Pt will demonstrate improvement in quad flexibility by > 10 degrees in maria elena test position in order to improve biomechanics during squats lunges. Eval:                        -DSSL Knee:28 degrees                         -Right knee: 35 degrees  Status at PN:                         -Left Knee:32 degrees                         -Right knee: 35 degrees   Current: no change      2.  Pt will demonstrate full pain free AROM of lumbar spine in order to return to PLOF. Eval:  ROM % AROM Comments:pain, area   Forward flexion 40-60 100%  Poor reversal of lumbar lordosis, all hamstring mobility   Extension 20-30 25% \"hard stop\" per patient   SB right 20-30 75%      SB left 20-30 75%      Rotation right 5-10 75%      Rotation left 5-10 100%          Status at PN:   Extension: 50% with hard stop per patient  Full AROM with rotation and side bend. -PROGRESSING  Current: progressing      3.  Pt will demonstrate improvement in bilateral hip strength to >5/5 in order to improve biomechanics during squats.    Eval:     L(0-5) R (0-5) N/T   Hip Flexion (L1,2) 4- 4- []   Knee Extension (L3,4) 5 5 []   Ankle Dorsiflexion (L4) 5 5 []   Great Toe Extension (L5) 5 5 []   Ankle Plantarflexion (S1) 5 5 []   Knee Flexion (S1,2) 4+ 5 []   Gluteus Jarad 4- 4- []   Hip ABd 4- 4- []       Status at PN: Unable to hold abduction with side plank on left -PROGRESSING  Current: Right glute strength: 4- /5                Left glute strength: 4/5      PLAN  [x]  Upgrade activities as tolerated     [x]  Continue plan of care  []  Update interventions per flow sheet       []  Discharge due to:_  []  Other:_      Tate Warner PT 2/7/2018  9:19 AM    Future Appointments  Date Time Provider [place] Ames   2/14/2018 9:00 AM Tate Warner PT [id number]

## 2018-02-14 ENCOUNTER — HOSPITAL ENCOUNTER (OUTPATIENT)
Dept: PHYSICAL THERAPY | Age: 38
Discharge: HOME | End: 2018-02-14
Payer: COMMERCIAL

## 2018-02-14 PROCEDURE — 97140 MANUAL THERAPY 1/> REGIONS: CPT | Performed by: PHYSICAL THERAPIST

## 2018-02-14 PROCEDURE — 97110 THERAPEUTIC EXERCISES: CPT | Performed by: PHYSICAL THERAPIST

## 2018-02-14 NOTE — PROGRESS NOTES
[facility] at [facility] [address], [place], [address] [facility]  Phone: [phone]      Fax:  [phone]    Progress Note  Patient name: Marly Lott Start of Care: 2017   Referral source: Kenya Christopher MD : 1980                         Medical Diagnosis: Low back pain [M54.5] Onset Date:10/25/17                         Treatment Diagnosis: Low back pain, Ryan Newton   Prior Hospitalization: see medical history Provider#: [id number]   Medications: Verified on Patient summary List    Comorbidities: Ryan Newton   Prior Level of Function: Independent with all functional mobility. Crossfitter, . Limitations to PLOF: Patient has not returned to normal gym activities. Visits from Start of Care: 11    Missed Visits: 0      Progress towards goals / Updated goals:  Long Term Goals: LTG- To be accomplished in 6-8 week(s):  1.  Pt will demonstrate improvement in quad flexibility by > 10 degrees in maria elena test position in order to improve biomechanics during squats lunges. Eval:                        -CQHN Knee:28 degrees                         -Right knee: 35 degrees  Status at PN:                         -Left Knee:32 degrees                         -Right knee: 35 degrees   Current: -progressing on right LE  -Left knee: 30 degrees  -Right knee: 40 degree       2.  Pt will demonstrate full pain free AROM of lumbar spine in order to return to PLOF.   Eval:  ROM % AROM Comments:pain, area   Forward flexion 40-60 100%  Poor reversal of lumbar lordosis, all hamstring mobility   Extension 20-30 25% \"hard stop\" per patient   SB right 20-30 75%      SB left 20-30 75%      Rotation right 5-10 75%      Rotation left 5-10 100%          Status at PN:   Extension: 50% with hard stop per patient  Full AROM with rotation and side bend     Current: Progressing with all ROM but limited into extension-Progressing      3.  Pt will demonstrate improvement in bilateral hip strength to >5/5 in order to improve biomechanics during squats. Eval:     L(0-5) R (0-5) N/T   Hip Flexion (L1,2) 4- 4- []   Knee Extension (L3,4) 5 5 []   Ankle Dorsiflexion (L4) 5 5 []   Great Toe Extension (L5) 5 5 []   Ankle Plantarflexion (S1) 5 5 []   Knee Flexion (S1,2) 4+ 5 []   Gluteus Jarad 4- 4- []   Hip ABd 4- 4- []       Status at PN: Unable to hold abduction with side plank on left   Current: Right glute strength: 4- /5                Left glute strength: 4/5    4. (new Goal as of 2/14/18) Patient will be able to complete 20 single leg glute bridges bilaterally in order to demonstrate improvement in functional hip strength. Progress note: Patient able to complete 6 on box before requiring rest break. Current: NA    Key Functional Changes: Patient is an active 41 y/o female who presents to outpatient PT s/p L4-L5 lumbar fusion on 10/25/17 by Dr. Jef Perez secondary to spondylolisthesis. Patient with history of [place] and has been able to maintain function through body building and crossfit. During evaluation, patient demonstrated quad dominance during squat assessment with poor engagement of glutes and hamstrings as well as poor quad flexibility. . Patient has attended 6 PT sessions in order to initiate core strengthening program as well as education on proper engagement of glutes and hamstrings during functional lifts including front squats and back squats. Patient very self aware of body mechanics and has made good progress towards goals. Continues to have tightness and pain in left QL and right glute weakness that we are addressing in therapy. Progressing with weight lifting slowly in the gym.      Patient will continue to benefit from skilled PT services to modify and progress therapeutic interventions, address functional mobility deficits and address ROM deficits to attain remaining goals. Recommend 1x week for 6 more weeks.      Updated Goals: to be achieved in 6 weeks:   Continue unmet goals. Added goal for tolerance working out in gym.    ASSESSMENT/RECOMMENDATIONS:  [x]Continue therapy per initial plan/protocol at a frequency of  1 x per week for 6 weeks  []Continue therapy with the following recommended changes:_____________________      _____________________________________________________________________  []Discontinue therapy progressing towards or have reached established goals  []Discontinue therapy due to lack of appreciable progress towards goals  []Discontinue therapy due to lack of attendance or compliance  []Await Physician's recommendations/decisions regarding therapy  []Other:________________________________________________________________    Thank you for this referral.   Parnell Schirmer, PT 2/14/2018 11:57 AM

## 2018-02-14 NOTE — PROGRESS NOTES
PT DAILY TREATMENT NOTE     Patient Name: Anjali Teran  Date:2018  : 1980  [x]  Patient  Verified  Payor: Lucien Shrestha / [facility] / Product Type: Commerical /    In time:9:10  Out time:10:15  Total Treatment Time (min): 65  Visit #: 11 of 18    Treatment Area: Low back pain [M54.5]    SUBJECTIVE  Pain Level (0-10 scale): 0 (tight)  Any medication changes, allergies to medications, adverse drug reactions, diagnosis change, or new procedure performed?: [x] No    [] Yes (see summary sheet for update)  Subjective functional status/changes:   [] No changes reported  \"I really messed up my back this weekend with rowing; it just got really tight and I think I did too many back squats. \"    OBJECTIVE        45 min Therapeutic Exercise:  [x] See flow sheet :   Rationale: increase ROM, increase strength and improve coordination to improve the patients ability to perform daily activities with decreased pain and symptom levels        20 min Manual Therapy:  Ischemic release to right piriformis, right QL, right hip flexor. Left TFL, left pectineus and left QL. MFR to thoracolumbar fascia   Rationale: decrease pain, increase ROM and increase tissue extensibility to perform daily activities with decreased pain and symptom levels            With   [x] TE   [] TA   [] neuro   [] other: Patient Education: [x] Review HEP    [x] Progressed/Changed HEP based on:   [] positioning   [] body mechanics   [] transfers   [] heat/ice application    [] other:      Other Objective/Functional Measures:   See below for current measurements     Pain Level (0-10 scale) post treatment: 0/10    ASSESSMENT/Changes in Function: Patient is an active 41 y/o female who presents to outpatient PT s/p L4-L5 lumbar fusion on 10/25/17 by Dr. Bereket Monson secondary to spondylolisthesis. Patient with history of [place] and has been able to maintain function through body building and crossfit.  During evaluation, patient demonstrated quad dominance during squat assessment with poor engagement of glutes and hamstrings as well as poor quad flexibility. . Patient has attended 6 PT sessions in order to initiate core strengthening program as well as education on proper engagement of glutes and hamstrings during functional lifts including front squats and back squats. Patient very self aware of body mechanics and has made good progress towards goals. Continues to have tightness and pain in left QL and right glute weakness that we are addressing in therapy. Progressing with weight lifting slowly in the gym. Patient will continue to benefit from skilled PT services to modify and progress therapeutic interventions, address functional mobility deficits and address ROM deficits to attain remaining goals. Recommend 1x week for 6 more weeks. []  See Plan of Care  [x]  See progress note/recertification  []  See Discharge Summary         Progress towards goals / Updated goals:  Long Term Goals: LTG- To be accomplished in 6-8 week(s):  1.  Pt will demonstrate improvement in quad flexibility by > 10 degrees in maria elena test position in order to improve biomechanics during squats lunges. Eval:                        -HBIL Knee:28 degrees                         -Right knee: 35 degrees  Status at PN:                         -Left Knee:32 degrees                         -Right knee: 35 degrees   Current: -progressing on right LE  -Left knee: 30 degrees  -Right knee: 40 degree       2.  Pt will demonstrate full pain free AROM of lumbar spine in order to return to PLOF.   Eval:  ROM % AROM Comments:pain, area   Forward flexion 40-60 100%  Poor reversal of lumbar lordosis, all hamstring mobility   Extension 20-30 25% \"hard stop\" per patient   SB right 20-30 75%      SB left 20-30 75%      Rotation right 5-10 75%      Rotation left 5-10 100%          Status at PN:   Extension: 50% with hard stop per patient  Full AROM with rotation and side bend    Current: Progressing with all ROM but limited into extension-Progressing      3.  Pt will demonstrate improvement in bilateral hip strength to >5/5 in order to improve biomechanics during squats. Eval:     L(0-5) R (0-5) N/T   Hip Flexion (L1,2) 4- 4- []   Knee Extension (L3,4) 5 5 []   Ankle Dorsiflexion (L4) 5 5 []   Great Toe Extension (L5) 5 5 []   Ankle Plantarflexion (S1) 5 5 []   Knee Flexion (S1,2) 4+ 5 []   Gluteus Jarad 4- 4- []   Hip ABd 4- 4- []       Status at PN: Unable to hold abduction with side plank on left   Current: Right glute strength: 4- /5                Left glute strength: 4/5      PLAN  [x]  Upgrade activities as tolerated     [x]  Continue plan of care  []  Update interventions per flow sheet       []  Discharge due to:_  []  Other:_      Kesha Cortez, PT 2/14/2018  9:14 AM    No future appointments.

## 2018-02-22 ENCOUNTER — HOSPITAL ENCOUNTER (OUTPATIENT)
Dept: PHYSICAL THERAPY | Age: 38
Discharge: HOME | End: 2018-02-22
Payer: COMMERCIAL

## 2018-02-22 PROCEDURE — 97140 MANUAL THERAPY 1/> REGIONS: CPT | Performed by: PHYSICAL THERAPIST

## 2018-02-22 PROCEDURE — 97110 THERAPEUTIC EXERCISES: CPT | Performed by: PHYSICAL THERAPIST

## 2018-02-22 NOTE — PROGRESS NOTES
PT DAILY TREATMENT NOTE     Patient Name: Camille Cruz  Date:2018  : 1980  [x]  Patient  Verified  Payor: Gabriela Sykes / [facility] / Product Type: Commerical /    In time:1:05  Out time:2:05  Total Treatment Time (min): 60  Visit #: 12 of 18    Treatment Area: Low back pain [M54.5]    SUBJECTIVE  Pain Level (0-10 scale): 0/10  Any medication changes, allergies to medications, adverse drug reactions, diagnosis change, or new procedure performed?: [x] No    [] Yes (see summary sheet for update)  Subjective functional status/changes:   [] No changes reported  \"I have avoided during squats. \"    OBJECTIVE        30 min Therapeutic Exercise:  [x] See flow sheet :   Rationale: increase ROM, increase strength and improve coordination to improve the patients ability to perform daily activities with decreased pain and symptom levels      30 min Manual Therapy:  Ischemic release to right piriformis, right QL, right hip flexor. Left TFL, left pectineus and left QL. MFR to thoracolumbar fascia   Rationale: decrease pain, increase ROM and increase tissue extensibility to perform daily activities with decreased pain and symptom levels          With   [] TE   [] TA   [] neuro   [] other: Patient Education: [x] Review HEP    [] Progressed/Changed HEP based on:   [] positioning   [] body mechanics   [] transfers   [] heat/ice application    [] other:      Other Objective/Functional Measures:   -75% full lumbar extension without pain     Pain Level (0-10 scale) post treatment: 0/10    ASSESSMENT/Changes in Function: Patient continues to demonstrate left QL weakness that increases with back squats and deadlifts however is improving. Patient demonstrates neurologically inhibited hamstrings during deadlift.      Patient will continue to benefit from skilled PT services to modify and progress therapeutic interventions, address functional mobility deficits, address ROM deficits, address strength deficits, analyze and address soft tissue restrictions, analyze and cue movement patterns, analyze and modify body mechanics/ergonomics and assess and modify postural abnormalities to attain remaining goals. []  See Plan of Care  []  See progress note/recertification  []  See Discharge Summary         Progress towards goals / Updated goals:  Long Term Goals: LTG- To be accomplished in 6-8 week(s):  1.  Pt will demonstrate improvement in quad flexibility by > 10 degrees in maria elena test position in order to improve biomechanics during squats lunges. Eval:                        -VTGK Knee:28 degrees                         -Right knee: 35 degrees  Status at PN:                         -Left Knee:32 degrees                         -Right knee: 35 degrees   Current: -progressing on right LE  -Left knee: 30 degrees  -Right knee: 40 degree       2.  Pt will demonstrate full pain free AROM of lumbar spine in order to return to PLOF. Eval:  ROM % AROM Comments:pain, area   Forward flexion 40-60 100%  Poor reversal of lumbar lordosis, all hamstring mobility   Extension 20-30 25% \"hard stop\" per patient   SB right 20-30 75%      SB left 20-30 75%      Rotation right 5-10 75%      Rotation left 5-10 100%          Status at PN:   Extension: 50% with hard stop per patient  Full AROM with rotation and side bend      Current: Progressing with all ROM but limited into extension-Progressing      3.  Pt will demonstrate improvement in bilateral hip strength to >5/5 in order to improve biomechanics during squats. Eval:     L(0-5) R (0-5) N/T   Hip Flexion (L1,2) 4- 4- []   Knee Extension (L3,4) 5 5 []   Ankle Dorsiflexion (L4) 5 5 []   Great Toe Extension (L5) 5 5 []   Ankle Plantarflexion (S1) 5 5 []   Knee Flexion (S1,2) 4+ 5 []   Gluteus Jarad 4- 4- []   Hip ABd 4- 4- []       Status at PN: Unable to hold abduction with side plank on left   Current: Right glute strength: 4- /5                Left glute strength: 4/5     4. (new Goal as of 2/14/18) Patient will be able to complete 20 single leg glute bridges bilaterally in order to demonstrate improvement in functional hip strength. Progress note: Patient able to complete 6 on box before requiring rest break.    Current: NA      PLAN  [x]  Upgrade activities as tolerated     [x]  Continue plan of care  []  Update interventions per flow sheet       []  Discharge due to:_  []  Other:_      Michi Rodriguez PT 2/22/2018  2:32 PM    Future Appointments  Date Time Provider Juliette Ames   2/28/2018 9:00 AM Michi Rodriguez PT [facility]

## 2018-02-28 ENCOUNTER — HOSPITAL ENCOUNTER (OUTPATIENT)
Dept: PHYSICAL THERAPY | Age: 38
Discharge: HOME | End: 2018-02-28
Payer: COMMERCIAL

## 2018-02-28 PROCEDURE — 97110 THERAPEUTIC EXERCISES: CPT | Performed by: PHYSICAL THERAPIST

## 2018-02-28 PROCEDURE — 97140 MANUAL THERAPY 1/> REGIONS: CPT | Performed by: PHYSICAL THERAPIST

## 2018-02-28 NOTE — PROGRESS NOTES
PT DISCHARGE DAILY NOTE AND [id number]    Date:2018  Patient name: Guzman Prado Start of Care: 2017   Referral source: Merline Crews, MD : 1980                         Medical Diagnosis: Low back pain [M54.5] Onset Date:10/25/17                         Treatment Diagnosis: Low back pain, Claudia Hershey   Prior Hospitalization: see medical history Provider#: [id number]   Medications: Verified on Patient summary List    Comorbidities: Claudia Hershey   Prior Level of Function: Independent with all functional mobility. Crossfitter, . Limitations to PLOF: Patient has not returned to normal gym activities. Visits from Start of Care: 13    Missed Visits: 0    Reporting Period : 17 to 18    [x]  Patient  Verified  Payor: Babs Wright / [facility] / Product Type: Commerical /    In time:9:05  Out time:10:05  Total Treatment Time (min): 60  Visit #:  of 18    SUBJECTIVE  Pain Level (0-10 scale): 0/10  Any medication changes, allergies to medications, adverse drug reactions, diagnosis change, or new procedure performed?: [x] No    [] Yes (see summary sheet for update)  Subjective functional status/changes:   [] No changes reported  \"I have been feeling good. \"    OBJECTIVE           30 min Therapeutic Exercise:  [x] See flow sheet :   Rationale: increase ROM, increase strength and improve coordination to improve the patients ability to perform daily activities with decreased pain and symptom levels        30 min Manual Therapy:  Ischemic release to right piriformis, right QL, right hip flexor. Left TFL, left pectineus and left QL.  MFR to thoracolumbar fascia   Rationale: decrease pain, increase ROM and increase tissue extensibility to perform daily activities with decreased pain and symptom levels             With   [] TE   [] TA   [] neuro   [] other: Patient Education: [x] Review HEP    [] Progressed/Changed HEP based on:   [] positioning   [] body mechanics   [] transfers   [] heat/ice application    [] other:      Other Objective/Functional Measures:      Pain Level (0-10 scale) post treatment: 0/10    Summary of Care:  [address]- To be accomplished in 6-8 week(s):  1.  Pt will demonstrate improvement in quad flexibility by > 10 degrees in maria elena test position in order to improve biomechanics during squats lunges. Eval:                        -EUTO Knee:28 degrees                         -Right knee: 35 degrees  Status at PN:                         -Left Knee:32 degrees                         -Right knee: 35 degrees   Current: -progressing on right LE  -Left knee: 30 degrees  -Right knee: 40 degree       2.  Pt will demonstrate full pain free AROM of lumbar spine in order to return to PLOF. Eval:  ROM % AROM Comments:pain, area   Forward flexion 40-60 100%  Poor reversal of lumbar lordosis, all hamstring mobility   Extension 20-30 25% \"hard stop\" per patient   SB right 20-30 75%      SB left 20-30 75%      Rotation right 5-10 75%      Rotation left 5-10 100%          Status at PN:   Extension: 50% with hard stop per patient  Full AROM with rotation and side bend      Current: Progressing with all ROM but limited into extension-Progressing      3.  Pt will demonstrate improvement in bilateral hip strength to >5/5 in order to improve biomechanics during squats. Eval:     L(0-5) R (0-5) N/T   Hip Flexion (L1,2) 4- 4- []   Knee Extension (L3,4) 5 5 []   Ankle Dorsiflexion (L4) 5 5 []   Great Toe Extension (L5) 5 5 []   Ankle Plantarflexion (S1) 5 5 []   Knee Flexion (S1,2) 4+ 5 []   Gluteus Jarad 4- 4- []   Hip ABd 4- 4- []       Status at PN: Unable to hold abduction with side plank on left   Current: Right glute strength: 4- /5                Left glute strength: 4/5      4. (new Goal as of 2/14/18) Patient will be able to complete 20 single leg glute bridges bilaterally in order to demonstrate improvement in functional hip strength. Progress note: Patient able to complete 6 on box before requiring rest break. Current: NA    ASSESSMENT/Changes in Function: Patient has met all goals for discharge.     Thank you for this referral!      PLAN  [x]Discontinue therapy: [x]Patient has reached or is progressing toward set goals      []Patient is non-compliant or has abdicated      []Due to lack of appreciable progress towards set goals    Edgar Aviles, PT 2/28/2018  9:26 AM

## 2021-02-02 ENCOUNTER — HOSPITAL ENCOUNTER (OUTPATIENT)
Dept: PREADMISSION TESTING | Age: 41
Discharge: HOME | End: 2021-02-02
Payer: COMMERCIAL

## 2021-02-02 LAB
ALBUMIN SERPL-MCNC: 3.6 G/DL (ref 3.4–5)
ALBUMIN/GLOB SERPL: 1.2 {RATIO} (ref 0.8–1.7)
ALP SERPL-CCNC: 97 U/L (ref 45–117)
ALT SERPL-CCNC: 28 U/L (ref 13–56)
ANION GAP SERPL CALC-SCNC: 3 MMOL/L (ref 3–18)
AST SERPL-CCNC: 20 U/L (ref 10–38)
BILIRUB SERPL-MCNC: 0.4 MG/DL (ref 0.2–1)
BUN SERPL-MCNC: 21 MG/DL (ref 7–18)
BUN/CREAT SERPL: 21 (ref 12–20)
CALCIUM SERPL-MCNC: 8.9 MG/DL (ref 8.5–10.1)
CHLORIDE SERPL-SCNC: 104 MMOL/L (ref 100–111)
CO2 SERPL-SCNC: 31 MMOL/L (ref 21–32)
CREAT SERPL-MCNC: 1.01 MG/DL (ref 0.6–1.3)
ERYTHROCYTE [DISTWIDTH] IN BLOOD BY AUTOMATED COUNT: 13.4 % (ref 11.6–14.5)
GLOBULIN SER CALC-MCNC: 3 G/DL (ref 2–4)
GLUCOSE SERPL-MCNC: 87 MG/DL (ref 74–99)
HCT VFR BLD AUTO: 38.1 % (ref 35–45)
HGB BLD-MCNC: 12.5 G/DL (ref 12–16)
MCH RBC QN AUTO: 30 PG (ref 24–34)
MCHC RBC AUTO-ENTMCNC: 32.8 G/DL (ref 31–37)
MCV RBC AUTO: 91.4 FL (ref 74–97)
PLATELET # BLD AUTO: 426 K/UL (ref 135–420)
PMV BLD AUTO: 9.5 FL (ref 9.2–11.8)
POTASSIUM SERPL-SCNC: 4.7 MMOL/L (ref 3.5–5.5)
PROT SERPL-MCNC: 6.6 G/DL (ref 6.4–8.2)
RBC # BLD AUTO: 4.17 M/UL (ref 4.2–5.3)
SODIUM SERPL-SCNC: 138 MMOL/L (ref 136–145)
WBC # BLD AUTO: 7.3 K/UL (ref 4.6–13.2)

## 2021-02-02 PROCEDURE — 36415 COLL VENOUS BLD VENIPUNCTURE: CPT

## 2021-02-02 PROCEDURE — 80053 COMPREHEN METABOLIC PANEL: CPT

## 2021-02-02 PROCEDURE — 85027 COMPLETE CBC AUTOMATED: CPT

## 2021-02-19 ENCOUNTER — HOSPITAL ENCOUNTER (OUTPATIENT)
Dept: PREADMISSION TESTING | Age: 41
Discharge: HOME | End: 2021-02-19
Payer: COMMERCIAL

## 2021-02-19 PROCEDURE — U0003 INFECTIOUS AGENT DETECTION BY NUCLEIC ACID (DNA OR RNA); SEVERE ACUTE RESPIRATORY SYNDROME CORONAVIRUS 2 (SARS-COV-2) (CORONAVIRUS DISEASE [COVID-19]), AMPLIFIED PROBE TECHNIQUE, MAKING USE OF HIGH THROUGHPUT TECHNOLOGIES AS DESCRIBED BY CMS-2020-01-R: HCPCS

## 2021-02-20 LAB — SARS-COV-2, COV2NT: NOT DETECTED

## 2021-02-23 PROBLEM — K21.9 GERD (GASTROESOPHAGEAL REFLUX DISEASE): Chronic | Status: ACTIVE | Noted: 2021-02-23

## 2021-02-23 PROBLEM — G56.21 CUBITAL TUNNEL SYNDROME ON RIGHT: Chronic | Status: ACTIVE | Noted: 2021-02-23

## 2021-02-23 PROBLEM — Q79.60 EDS (EHLERS-DANLOS SYNDROME): Chronic | Status: ACTIVE | Noted: 2021-02-23

## 2021-02-23 PROBLEM — F41.9 ANXIETY: Chronic | Status: ACTIVE | Noted: 2021-02-23

## 2021-02-23 PROBLEM — G56.01 CARPAL TUNNEL SYNDROME OF RIGHT WRIST: Chronic | Status: ACTIVE | Noted: 2021-02-23

## 2021-02-23 PROBLEM — I73.00 RAYNAUD'S PHENOMENON: Chronic | Status: ACTIVE | Noted: 2021-02-23

## 2021-02-23 NOTE — H&P
History and Physical        Patient: Katia Platt               Sex: female          DOA: (Not on file)         YOB: 1980      Age:  41 y.o.        LOS:  LOS: 0 days        HPI:     Heidi Alaniz is in for follow up of her right shoulder bursitis/AC DJD/near full-thickness supraspinatus and subscapularis rotator cuff tear with biceps subluxation from a recent MRI, and is status post a right Bankart repair from 1992 with right probable cubital and carpal tunnel syndrome with a negative EMG. The patient is back in for followup and we discussed progression and treating her right shoulder but she is interested in getting her right hand and right elbow done earlier just because these have become more problematic. She spends a lot of time typing and doing other activities and this has aggravated her clinical carpal and cubital tunnel syndrome. Past Medical History:   Diagnosis Date    Velásquez's esophagus     Duke-Danlos syndrome     GERD (gastroesophageal reflux disease)     Barretts Esophagus    Hiatal hernia     Ill-defined condition     IBSC    Nausea & vomiting     Psychiatric disorder     depression       Past Surgical History:   Procedure Laterality Date    HX ADENOIDECTOMY      HX APPENDECTOMY      HX BREAST AUGMENTATION  2008    HX ORTHOPAEDIC  1994    right shoulder-labrum repair    HX TONSILLECTOMY      NEUROLOGICAL PROCEDURE UNLISTED  2018    spinal fusion       No family history on file. Social History     Socioeconomic History    Marital status: SINGLE     Spouse name: Not on file    Number of children: Not on file    Years of education: Not on file    Highest education level: Not on file   Tobacco Use    Smoking status: Never Smoker    Smokeless tobacco: Never Used   Substance and Sexual Activity    Alcohol use: No    Drug use: Yes     Comment: CBD with melatonin-stopped 2 weeks prior to DOS.      Sexual activity: Yes       Prior to Admission medications    Medication Sig Start Date End Date Taking? Authorizing Provider   RABEprazole (Aciphex) 20 mg TbEC Take 20 mg by mouth daily. Provider, Historical   testosterone (ANDROGEL) 1 % (25 mg/2.5gram) glpk 2.5 g by TransDERmal route every other day. Provider, Historical   LORazepam (Ativan) 1 mg tablet Take 1 mg by mouth every evening. Provider, Historical   meloxicam (MOBIC) 15 mg tablet Take 15 mg by mouth daily. Provider, Historical   magnesium 250 mg tab Take  by mouth. Provider, Historical   melatonin 1 mg tablet Take  by mouth. Provider, Historical   zinc sulfate (ZINC-220 PO) Take  by mouth. Provider, Historical   ascorbic acid, vitamin C, (Vitamin C) 250 mg tablet Take  by mouth. Provider, Historical   buPROPion SR (WELLBUTRIN SR) 200 mg SR tablet Take 300 mg by mouth two (2) times a day. Provider, Historical   LEVOMEFOLATE CALCIUM (DEPLIN PO) Take 15 mg by mouth nightly. Provider, Historical   cholecalciferol (VITAMIN D3) 1,000 unit cap Take 5,000 Units by mouth daily. Provider, Historical   lisdexamfetamine (VYVANSE) 30 mg capsule Take 30 mg by mouth every morningIndications: Attention-Deficit Hyperactivity Disorder. Provider, Historical       Allergies   Allergen Reactions    Amoxicillin Hives    Carrot Other (comments)     Allergy testing    Gluten Other (comments)    Pineapple Other (comments)     Allergy testing    Pork/Porcine Containing Products Other (comments)     Allergy testing    Scallops Other (comments)     Per allergy testing    Sweet Potato Other (comments)     Allergy testing       Review of Systems  GENERAL:  Patient has no signs of fever, chills or weight change. HEAD/ENTM:  Patient has no signs of headaches, dizziness, hearing loss, ringing in ears, sore throat/hoarseness, recent cold, double vision, blurred vision, itchy eyes, eye redness or eye discharge.   CARDIOVASCULAR:  Patient has no signs of chest pain, palpitations, rheumatic fever or heart murmur. RESPIRATORY:  Patient has no signs of chronic cough, wheezing, difficulty breathing, pain on breathing or shortness of breath. GASTROINTESTINAL:  Patient has no signs of nausea/vomiting, difficulty swallowing, gas/bloating, indigestion, abdominal pain, diarrhea, bloody stools or hemorrhoids. GENITOURINARY:  Patient has no signs of blood in urine, painful urinating, burning sensation, bladder/kidney infection, frequent urinating or incontinence. MUSCULOSKELETAL: Patient presents with joint pain. Patient has no signs of fracture/dislocation, sprain/strain, tendonitis, joint stiffness, rheumatoid disease, gout or swelling of feet. INTEGUMENTARY:  Patient has no signs of rash/itching, psoriasis, Raynaud's phenomenon or varicose veins. EMOTIONAL:  Patient has no signs of anxiety, depression, bipolar disorder, memory loss or change in mood. Physical Exam:      There were no vitals taken for this visit. Physical Exam:  Exam of the right hand shows she has some decreased light touch sensation in the tips of digits 1 and 2. She has a positive Tinel's into digits 1 and 2. Physical examination shows that the patients right hand demonstrates no obvious swelling, ecchymosis, or wounds noted. There is no tenderness to palpation anywhere within the wrist or hand. The patient has normal motion in all six directions. The patient has a negative direct carpal compression test.  The patient has a negative Finkelstein maneuver. The patient has good capillary refill. The patient has good  strength of the hand with normal thenar eminence tone.           Assessment/Plan     Principal Problem:    Carpal tunnel syndrome of right wrist (2/23/2021)    Active Problems:    EDS (Duke-Danlos syndrome) (2/23/2021)      GERD (gastroesophageal reflux disease) (2/23/2021)      Anxiety (2/23/2021)      Raynaud's phenomenon (2/23/2021)      Cubital tunnel syndrome on right (2/23/2021)      Dr. Reginaldo Thornton scheduled her for a right ECTR and right ECuTR which we will do on 02/25/21 instead of her shoulder. We will postpone her shoulder surgery to three weeks later or so and we will try to figure out a good date for this. She has her Norco for postoperative pain use and her five Keflex pills. She will use just her Norco postoperatively and she will use the Keflex at the time of her surgery for the shoulder. We talked about all the same risks and she is willing to proceed. Dr. Ben Vargas talked about the importance of postop ice and elevation particularly the first two to three days afterwards and she can return to light duty the Monday after surgery. He will see her ten days post surgery.

## 2021-02-24 RX ORDER — SODIUM CHLORIDE 0.9 % (FLUSH) 0.9 %
5-40 SYRINGE (ML) INJECTION AS NEEDED
Status: CANCELLED | OUTPATIENT
Start: 2021-02-24

## 2021-02-24 RX ORDER — SODIUM CHLORIDE 0.9 % (FLUSH) 0.9 %
5-40 SYRINGE (ML) INJECTION EVERY 8 HOURS
Status: CANCELLED | OUTPATIENT
Start: 2021-02-24

## 2021-02-25 ENCOUNTER — ANESTHESIA EVENT (OUTPATIENT)
Dept: SURGERY | Age: 41
End: 2021-02-25
Payer: COMMERCIAL

## 2021-02-25 ENCOUNTER — ANESTHESIA (OUTPATIENT)
Dept: SURGERY | Age: 41
End: 2021-02-25
Payer: COMMERCIAL

## 2021-02-25 ENCOUNTER — HOSPITAL ENCOUNTER (OUTPATIENT)
Age: 41
Setting detail: OUTPATIENT SURGERY
Discharge: HOME | End: 2021-02-25
Attending: ORTHOPAEDIC SURGERY | Admitting: ORTHOPAEDIC SURGERY
Payer: COMMERCIAL

## 2021-02-25 VITALS
RESPIRATION RATE: 16 BRPM | WEIGHT: 142.44 LBS | HEIGHT: 64 IN | HEART RATE: 85 BPM | BODY MASS INDEX: 24.32 KG/M2 | DIASTOLIC BLOOD PRESSURE: 56 MMHG | SYSTOLIC BLOOD PRESSURE: 107 MMHG | OXYGEN SATURATION: 98 % | TEMPERATURE: 99 F

## 2021-02-25 LAB — HCG UR QL: NEGATIVE

## 2021-02-25 PROCEDURE — 77030020782 HC GWN BAIR PAWS FLX 3M -B: Performed by: ORTHOPAEDIC SURGERY

## 2021-02-25 PROCEDURE — 74011000250 HC RX REV CODE- 250: Performed by: ANESTHESIOLOGY

## 2021-02-25 PROCEDURE — 77030009770 HC INSTRU CRPL SET CNMD -C: Performed by: ORTHOPAEDIC SURGERY

## 2021-02-25 PROCEDURE — 74011250636 HC RX REV CODE- 250/636: Performed by: ANESTHESIOLOGY

## 2021-02-25 PROCEDURE — 2709999900 HC NON-CHARGEABLE SUPPLY: Performed by: ORTHOPAEDIC SURGERY

## 2021-02-25 PROCEDURE — 74011250636 HC RX REV CODE- 250/636: Performed by: PHYSICIAN ASSISTANT

## 2021-02-25 PROCEDURE — 76210000006 HC OR PH I REC 0.5 TO 1 HR: Performed by: ORTHOPAEDIC SURGERY

## 2021-02-25 PROCEDURE — 77030020268 HC MISC GENERAL SUPPLY: Performed by: ORTHOPAEDIC SURGERY

## 2021-02-25 PROCEDURE — 77030013079 HC BLNKT BAIR HGGR 3M -A: Performed by: ANESTHESIOLOGY

## 2021-02-25 PROCEDURE — 76210000026 HC REC RM PH II 1 TO 1.5 HR: Performed by: ORTHOPAEDIC SURGERY

## 2021-02-25 PROCEDURE — 81025 URINE PREGNANCY TEST: CPT

## 2021-02-25 PROCEDURE — 77030040361 HC SLV COMPR DVT MDII -B: Performed by: ORTHOPAEDIC SURGERY

## 2021-02-25 PROCEDURE — 76010000138 HC OR TIME 0.5 TO 1 HR: Performed by: ORTHOPAEDIC SURGERY

## 2021-02-25 PROCEDURE — 74011250637 HC RX REV CODE- 250/637: Performed by: ANESTHESIOLOGY

## 2021-02-25 PROCEDURE — 77030012508 HC MSK AIRWY LMA AMBU -A: Performed by: ANESTHESIOLOGY

## 2021-02-25 PROCEDURE — 74011250636 HC RX REV CODE- 250/636: Performed by: ORTHOPAEDIC SURGERY

## 2021-02-25 PROCEDURE — 76060000032 HC ANESTHESIA 0.5 TO 1 HR: Performed by: ORTHOPAEDIC SURGERY

## 2021-02-25 PROCEDURE — 77030000032 HC CUF TRNQT ZIMM -B: Performed by: ORTHOPAEDIC SURGERY

## 2021-02-25 RX ORDER — ACETAMINOPHEN 500 MG
1000 TABLET ORAL ONCE
Status: DISCONTINUED | OUTPATIENT
Start: 2021-02-25 | End: 2021-02-25 | Stop reason: HOSPADM

## 2021-02-25 RX ORDER — SCOLOPAMINE TRANSDERMAL SYSTEM 1 MG/1
1 PATCH, EXTENDED RELEASE TRANSDERMAL ONCE
Status: DISCONTINUED | OUTPATIENT
Start: 2021-02-25 | End: 2021-02-25 | Stop reason: HOSPADM

## 2021-02-25 RX ORDER — PROPOFOL 10 MG/ML
INJECTION, EMULSION INTRAVENOUS AS NEEDED
Status: DISCONTINUED | OUTPATIENT
Start: 2021-02-25 | End: 2021-02-25 | Stop reason: HOSPADM

## 2021-02-25 RX ORDER — HYDROMORPHONE HYDROCHLORIDE 1 MG/ML
0.5 INJECTION, SOLUTION INTRAMUSCULAR; INTRAVENOUS; SUBCUTANEOUS
Status: DISCONTINUED | OUTPATIENT
Start: 2021-02-25 | End: 2021-02-25 | Stop reason: HOSPADM

## 2021-02-25 RX ORDER — HYDROCODONE BITARTRATE AND ACETAMINOPHEN 5; 325 MG/1; MG/1
1 TABLET ORAL AS NEEDED
Status: DISCONTINUED | OUTPATIENT
Start: 2021-02-25 | End: 2021-02-25 | Stop reason: HOSPADM

## 2021-02-25 RX ORDER — FENTANYL CITRATE 50 UG/ML
INJECTION, SOLUTION INTRAMUSCULAR; INTRAVENOUS AS NEEDED
Status: DISCONTINUED | OUTPATIENT
Start: 2021-02-25 | End: 2021-02-25 | Stop reason: HOSPADM

## 2021-02-25 RX ORDER — SODIUM CHLORIDE, SODIUM LACTATE, POTASSIUM CHLORIDE, CALCIUM CHLORIDE 600; 310; 30; 20 MG/100ML; MG/100ML; MG/100ML; MG/100ML
25 INJECTION, SOLUTION INTRAVENOUS CONTINUOUS
Status: DISCONTINUED | OUTPATIENT
Start: 2021-02-25 | End: 2021-02-25 | Stop reason: HOSPADM

## 2021-02-25 RX ORDER — SODIUM CHLORIDE, SODIUM LACTATE, POTASSIUM CHLORIDE, CALCIUM CHLORIDE 600; 310; 30; 20 MG/100ML; MG/100ML; MG/100ML; MG/100ML
125 INJECTION, SOLUTION INTRAVENOUS CONTINUOUS
Status: DISCONTINUED | OUTPATIENT
Start: 2021-02-25 | End: 2021-02-25 | Stop reason: HOSPADM

## 2021-02-25 RX ORDER — DEXAMETHASONE SODIUM PHOSPHATE 4 MG/ML
INJECTION, SOLUTION INTRA-ARTICULAR; INTRALESIONAL; INTRAMUSCULAR; INTRAVENOUS; SOFT TISSUE AS NEEDED
Status: DISCONTINUED | OUTPATIENT
Start: 2021-02-25 | End: 2021-02-25 | Stop reason: HOSPADM

## 2021-02-25 RX ORDER — KETAMINE HYDROCHLORIDE 10 MG/ML
INJECTION, SOLUTION INTRAMUSCULAR; INTRAVENOUS AS NEEDED
Status: DISCONTINUED | OUTPATIENT
Start: 2021-02-25 | End: 2021-02-25 | Stop reason: HOSPADM

## 2021-02-25 RX ORDER — FLUMAZENIL 0.1 MG/ML
0.2 INJECTION INTRAVENOUS
Status: DISCONTINUED | OUTPATIENT
Start: 2021-02-25 | End: 2021-02-25 | Stop reason: HOSPADM

## 2021-02-25 RX ORDER — DIPHENHYDRAMINE HYDROCHLORIDE 50 MG/ML
12.5 INJECTION, SOLUTION INTRAMUSCULAR; INTRAVENOUS
Status: DISCONTINUED | OUTPATIENT
Start: 2021-02-25 | End: 2021-02-25 | Stop reason: HOSPADM

## 2021-02-25 RX ORDER — ALBUTEROL SULFATE 0.83 MG/ML
2.5 SOLUTION RESPIRATORY (INHALATION)
Status: DISCONTINUED | OUTPATIENT
Start: 2021-02-25 | End: 2021-02-25 | Stop reason: HOSPADM

## 2021-02-25 RX ORDER — LIDOCAINE HYDROCHLORIDE 20 MG/ML
INJECTION, SOLUTION EPIDURAL; INFILTRATION; INTRACAUDAL; PERINEURAL AS NEEDED
Status: DISCONTINUED | OUTPATIENT
Start: 2021-02-25 | End: 2021-02-25 | Stop reason: HOSPADM

## 2021-02-25 RX ORDER — ONDANSETRON 2 MG/ML
INJECTION INTRAMUSCULAR; INTRAVENOUS AS NEEDED
Status: DISCONTINUED | OUTPATIENT
Start: 2021-02-25 | End: 2021-02-25 | Stop reason: HOSPADM

## 2021-02-25 RX ORDER — ONDANSETRON 2 MG/ML
4 INJECTION INTRAMUSCULAR; INTRAVENOUS ONCE
Status: COMPLETED | OUTPATIENT
Start: 2021-02-25 | End: 2021-02-25

## 2021-02-25 RX ORDER — MIDAZOLAM HYDROCHLORIDE 1 MG/ML
INJECTION, SOLUTION INTRAMUSCULAR; INTRAVENOUS AS NEEDED
Status: DISCONTINUED | OUTPATIENT
Start: 2021-02-25 | End: 2021-02-25 | Stop reason: HOSPADM

## 2021-02-25 RX ORDER — HYDROMORPHONE HYDROCHLORIDE 1 MG/ML
0.2 INJECTION, SOLUTION INTRAMUSCULAR; INTRAVENOUS; SUBCUTANEOUS AS NEEDED
Status: DISCONTINUED | OUTPATIENT
Start: 2021-02-25 | End: 2021-02-25 | Stop reason: HOSPADM

## 2021-02-25 RX ADMIN — KETAMINE HYDROCHLORIDE 30 MG: 10 INJECTION, SOLUTION INTRAMUSCULAR; INTRAVENOUS at 07:45

## 2021-02-25 RX ADMIN — ONDANSETRON HYDROCHLORIDE 4 MG: 2 INJECTION INTRAMUSCULAR; INTRAVENOUS at 07:35

## 2021-02-25 RX ADMIN — LIDOCAINE HYDROCHLORIDE 80 MG: 20 INJECTION, SOLUTION EPIDURAL; INFILTRATION; INTRACAUDAL; PERINEURAL at 07:31

## 2021-02-25 RX ADMIN — ONDANSETRON 4 MG: 2 INJECTION INTRAMUSCULAR; INTRAVENOUS at 09:55

## 2021-02-25 RX ADMIN — FENTANYL CITRATE 50 MCG: 50 INJECTION, SOLUTION INTRAMUSCULAR; INTRAVENOUS at 07:31

## 2021-02-25 RX ADMIN — KETAMINE HYDROCHLORIDE 20 MG: 10 INJECTION, SOLUTION INTRAMUSCULAR; INTRAVENOUS at 07:57

## 2021-02-25 RX ADMIN — MIDAZOLAM 2 MG: 1 INJECTION INTRAMUSCULAR; INTRAVENOUS at 07:27

## 2021-02-25 RX ADMIN — SODIUM CHLORIDE, SODIUM LACTATE, POTASSIUM CHLORIDE, AND CALCIUM CHLORIDE 125 ML/HR: 600; 310; 30; 20 INJECTION, SOLUTION INTRAVENOUS at 06:39

## 2021-02-25 RX ADMIN — SODIUM CHLORIDE, SODIUM LACTATE, POTASSIUM CHLORIDE, AND CALCIUM CHLORIDE 125 ML/HR: 600; 310; 30; 20 INJECTION, SOLUTION INTRAVENOUS at 08:30

## 2021-02-25 RX ADMIN — FENTANYL CITRATE 50 MCG: 50 INJECTION, SOLUTION INTRAMUSCULAR; INTRAVENOUS at 07:48

## 2021-02-25 RX ADMIN — PROPOFOL 150 MG: 10 INJECTION, EMULSION INTRAVENOUS at 07:31

## 2021-02-25 RX ADMIN — DEXAMETHASONE SODIUM PHOSPHATE 4 MG: 4 INJECTION, SOLUTION INTRAMUSCULAR; INTRAVENOUS at 07:34

## 2021-02-25 RX ADMIN — HYDROMORPHONE HYDROCHLORIDE 0.2 MG: 1 INJECTION, SOLUTION INTRAMUSCULAR; INTRAVENOUS; SUBCUTANEOUS at 08:41

## 2021-02-25 RX ADMIN — SODIUM CHLORIDE, SODIUM LACTATE, POTASSIUM CHLORIDE, AND CALCIUM CHLORIDE: 600; 310; 30; 20 INJECTION, SOLUTION INTRAVENOUS at 07:27

## 2021-02-25 RX ADMIN — HYDROMORPHONE HYDROCHLORIDE 0.2 MG: 1 INJECTION, SOLUTION INTRAMUSCULAR; INTRAVENOUS; SUBCUTANEOUS at 08:53

## 2021-02-25 NOTE — ANESTHESIA POSTPROCEDURE EVALUATION
Post-Anesthesia Evaluation & Assessment    Visit Vitals  /65   Pulse 100   Temp 36.8 °C (98.3 °F)   Resp 15   Ht 5' 3.5\" (1.613 m)   Wt 64.6 kg (142 lb 7 oz)   SpO2 97%   BMI 24.84 kg/m²       Nausea/Vomiting: no nausea and no vomiting    Post-operative hydration adequate. Pain score (VAS): 6    Mental status & Level of consciousness: alert and oriented x 3    Neurological status: moves all extremities, sensation grossly intact    Pulmonary status: airway patent, no supplemental oxygen required    Complications related to anesthesia: none    Patient has met all discharge requirements. Additional comments:  Post-Anesthesia Evaluation & Assessment    Visit Vitals  /65   Pulse 100   Temp 36.8 °C (98.3 °F)   Resp 15   Ht 5' 3.5\" (1.613 m)   Wt 64.6 kg (142 lb 7 oz)   SpO2 97%   BMI 24.84 kg/m²       Nausea/Vomiting: no nausea and no vomiting    Post-operative hydration adequate. Pain score (VAS): 7    Mental status & Level of consciousness: alert and oriented x 3    Neurological status: moves all extremities, sensation grossly intact    Pulmonary status: airway patent, no supplemental oxygen required    Complications related to anesthesia: none    Patient has met all discharge requirements. Additional comments:  Procedure(s):  RIGHT ENDOSCOPIC CARPAL AND  CUBITAL TUNNEL RELEASE.    general    <BSHSIANPOST>    INITIAL Post-op Vital signs:   Vitals Value Taken Time   /65 02/25/21 0900   Temp 36.8 °C (98.3 °F) 02/25/21 0835   Pulse 105 02/25/21 0902   Resp 19 02/25/21 0902   SpO2 98 % 02/25/21 0902   Vitals shown include unvalidated device data.

## 2021-02-25 NOTE — PERIOP NOTES
Reviewed PTA medication list with patient/caregiver and patient/caregiver denies any additional medications. Patient admits to having a responsible adult care for them at home for at least 24 hours after surgery. Patient encouraged to use gown warming system and informed that using said warming gown to regulate body temperature prior to a procedure has been shown to help reduce the risks of blood clots and infection. Patient's pharmacy of choice verified and documented in PTA medication section. Dual skin assessment & fall risk band verification completed with Tawanda Duenas RN.

## 2021-02-25 NOTE — ANESTHESIA PREPROCEDURE EVALUATION
Relevant Problems   GASTROINTESTINAL   (+) GERD (gastroesophageal reflux disease)       Anesthetic History     PONV          Review of Systems / Medical History  Patient summary reviewed, nursing notes reviewed and pertinent labs reviewed    Pulmonary                   Neuro/Psych         Psychiatric history     Cardiovascular                  Exercise tolerance: >4 METS     GI/Hepatic/Renal     GERD           Endo/Other        Arthritis     Other Findings   Comments: Tiffany Banks           Physical Exam    Airway  Mallampati: II  TM Distance: 4 - 6 cm  Neck ROM: normal range of motion        Cardiovascular    Rhythm: regular  Rate: normal         Dental  No notable dental hx       Pulmonary  Breath sounds clear to auscultation               Abdominal  GI exam deferred       Other Findings            Anesthetic Plan    ASA: 2  Anesthesia type: general          Induction: Intravenous  Anesthetic plan and risks discussed with: Patient

## 2021-02-25 NOTE — INTERVAL H&P NOTE
Update History & Physical 
 
The Patient's History and Physical of February 23,2021 The surgery was reviewed with the patient and I examined the patient. There was no change. The surgical site was confirmed by the patient and me. Plan:  The risk, benefits, expected outcome, and alternative to the recommended procedure have been discussed with the patient. Patient understands and wants to proceed with the procedure.  
 
Electronically signed by Jose De Jesus Wang MD on 2/25/2021 at 7:17 AM

## 2021-02-25 NOTE — DISCHARGE INSTRUCTIONS
Patient Education        9 Things To Do If You've Been Exposed to COVID-19    Stay home. If you've been exposed, you should stay in quarantine for at least 14 days. Ask your doctor when it's safe to end your quarantine. Don't go to school, work, or public areas. And don't use public transportation, ride-shares, or taxis unless you have no choice. Leave your home only if you need to get medical care. But call the doctor's office first so they know you're coming, and wear a cloth face cover when you go. Call your doctor. Call your doctor or other health professional to let them know that you've been exposed. They might want you to be tested, or they may have other instructions for you. If you become sick, wear a face cover when you are around other people. It can help stop the spread of the virus when you cough or sneeze. Limit contact with people in your home. If possible, stay in a separate bedroom and use a separate bathroom. Avoid contact with pets and other animals. Cover your mouth and nose with a tissue when you cough or sneeze. Then throw it in the trash right away. Wash your hands often, especially after you cough or sneeze. Use soap and water, and scrub for at least 20 seconds. If soap and water aren't available, use an alcohol-based hand . Don't share personal household items. These include bedding, towels, cups and glasses, and eating utensils. Clean and disinfect your home every day. Use household  or disinfectant wipes or sprays. Take special care to clean things that you grab with your hands. These include doorknobs, remote controls, phones, and handles on your refrigerator and microwave. And don't forget countertops, tabletops, bathrooms, and computer keyboards. Current as of: December 18, 2020               Content Version: 12.7  © 2006-2021 [employer].    Care instructions adapted under license by [employer] (which disclaims liability or warranty for this information). If you have questions about a medical condition or this instruction, always ask your healthcare professional. [address] any warranty or liability for your use of this information. DISCHARGE SUMMARY from Nurse    PATIENT INSTRUCTIONS:    After general anesthesia or intravenous sedation, for 24 hours or while taking prescription Narcotics:  · Limit your activities  · Do not drive and operate hazardous machinery  · Do not make important personal or business decisions  · Do  not drink alcoholic beverages  · If you have not urinated within 8 hours after discharge, please contact your surgeon on call. Report the following to your surgeon:  · Excessive pain, swelling, redness or odor of or around the surgical area  · Temperature over 100.5  · Nausea and vomiting lasting longer than 4 hours or if unable to take medications  · Any signs of decreased circulation or nerve impairment to extremity: change in color, persistent  numbness, tingling, coldness or increase pain  · Any questions    What to do at Home:  [address] 2 WEEKS CALL FOR APPT    If you experience any of the following symptoms heavy bleeding, fevers, severe pain, circulation changes, please follow up with dr Rossy Buchanan    *  Please give a list of your current medications to your Primary Care Provider. *  Please update this list whenever your medications are discontinued, doses are      changed, or new medications (including over-the-counter products) are added. *  Please carry medication information at all times in case of emergency situations. These are general instructions for a healthy lifestyle:    No smoking/ No tobacco products/ Avoid exposure to second hand smoke  Surgeon General's Warning:  Quitting smoking now greatly reduces serious risk to your health.     Obesity, smoking, and sedentary lifestyle greatly increases your risk for illness    A healthy diet, regular physical exercise & weight monitoring are important for maintaining a healthy lifestyle    You may be retaining fluid if you have a history of heart failure or if you experience any of the following symptoms:  Weight gain of 3 pounds or more overnight or 5 pounds in a week, increased swelling in our hands or feet or shortness of breath while lying flat in bed. Please call your doctor as soon as you notice any of these symptoms; do not wait until your next office visit. The discharge information has been reviewed with the patient and caregiver. The patient and caregiver verbalized understanding. Discharge medications reviewed with the patient and caregiver and appropriate educational materials and side effects teaching were provided. ___________________________________________________________________________________________________________________________________    Ok SHAYNA Downey MD Cynda Beverage, PA-C    Upper Extremity Surgery  Discharge Instructions      Please take the time to review the following instructions before you leave the hospital and use them as guidelines during your recovery from surgery. If you have any questions you may contact my office at [phone]. Wound Care/Dressing Changes:    []   You may remove the bulky dressing two days after surgery. Once you remove this, no dressing is necessary if there is no drainage. [x]   You may change your dressing as needed. Beginning the 2 days after you are discharged from the hospital you should change your dressing daily. A big, bulky dressing isnt necessary as long as there is any drainage from the incisions. You can put a band-aid or a piece of gauze over each incision and wear an ACE bandage as needed for comfort and swelling. []   Dont remove your dressing or get them wet.  It isnt necessary to apply antibiotic ointment to your incisions.   Sutures will be removed at your one week post-op visit. Staples (if you have them) are removed in two weeks. If you have steri-strips over your incision they will start to peel off in 7-10 days as you get them wet. They dont need to be removed prior to that. When they begin to peel off, you may remove them. They should all be removed by 14 days from your surgery. Showering/Bathing:    [x]   You may shower 2 days after your surgery. Your dressing may be removed for showering. You may get your incisions wet in the shower. Dont vigorously scrub your incisions. Apply a clean, dry dressing after you have dried your incisions. Do not take a bath or get into a swimming pool or Jacuzzi until the incisions are completely healed. This may take about 14 days. Do not soak your incision under water. Sling:    [x]   You are not required to wear your sling and should do so only as needed for comfort. You have no restrictions with regards to the movement of your shoulder. Please push to achieve full range of motion as soon as possible. You may resume your normal daily activities immediately and return to work as soon as you feel appropriate.    []   Keep your arm in the immobilizer at all times except when showering and changing your clothes. When showering or changing, keep your arm at your side. Dont move it away from your body. []   Keep your arm in the immobilizer at all times except when showering, changing your clothes and doing the exercises shown to you by Dr. Philip Polanco or your physical therapist prior to your discharge from the hospital.  Keep your arm at your side when changing your clothes and showering. Dont move it away from your body. Ice/Elevation    Continue ice consistently for 48 hours after surgery. After 48 hours, you should ice your shoulder 3 times per day, for 20 minutes at a time for the next 5 days. After one week from surgery, you may use ice as needed for pain and swelling.     Diet:    You may advance to your regular diet as tolerated. Medication:    1. You will be given a prescription for pain medication when you are discharged from the hospital.  Take the medication as needed according to the directions on the prescription bottle. Possible side effects of the medication include dizziness, headache, nausea, vomiting, constipation and urinary retention. If you experience any of these side effects call the office so that we can assist you in relieving them. Discontinue the use of the pain medication if you develop itching, rash, shortness of breath or difficulties swallowing. If these symptoms become severe or arent relieved by discontinuing the medication you should seek immediate medical attention. Refills of pain medication are authorized during office hours only (8 AM-5PM Mon. thru Fri.). 2. If you were prescribed Percoset/oxycodone or Dilaudid/hydromorphone you must have a written prescription. These medications legally cannot be called in to the pharmacy. 3. You may take over the counter Ibuprofen/Advil/Aleve between dosages of your pain medication if needed. Do not take Tylenol in addition to your pain medication as most of the pain medication already contains Tylenol. Do not exceed 3000mg of Tylenol per day. Ex: (hydrocodone 5/325g= 325mg of Tylenol)  4. You may resume the medication you were taking prior to surgery. Pain medication may change the effects of any antidepressant medication you are taking. If you have any questions about possible interactions between your regular medications and the pain medication you should consult the physician who prescribes your regular medications. Follow Up Appointment:  If you are unsure of your follow-up appointment date and time, please call [phone]. Please let our  know you are scheduling a post-op appointment. Most appointments should be between 7-14 days after your surgery.     Physical Therapy:    []    If you already have a therapy appointment, please be sure to attend your sessions as scheduled. []   Physical Therapy will be discussed with you at your first follow-up appointment with Dr. Ben Vargas. You dont need to begin physical therapy prior to that.    []  Begin physical therapy with your Home Health Physical Therapy. This will be set up         for your before you leave the hospital.    [x]  You do not require Physical Therapy. Important Signs and Symptoms:    If any of the following signs and symptoms occurs, you should contact Dr. Ben Vargas office. Please be advised if a problem arises which you feel requires immediate medical attention or you are unable to contact Dr. Ben Vargas office you should seek immediate medical attention. Signs and symptoms to watch for include:    1. A sudden increase in swelling and/or redness or warmth at the area your surgery was performed which isnt relieved by rest, ice, and elevation. 2. Oral temperature greater than 101 degrees for 12 hours or more which isnt relieved by an increase in fluid intake and taking two Tylenol every 4-6 hours. 3. Excessive drainage from your incisions, or drainage which hasnt stopped by 72 hours after your surgery. 4. Fever, chills, shortness of breath, chest pain, nausea, vomiting or other signs and symptoms which are of concern to you.     Patient armband removed and shredded

## 2021-02-25 NOTE — OP NOTES
ENDOSCOPIC CARPAL & CUBITAL TUNNEL  RELEASE     Patient: Ivis Duncan MRN: [id number]  SSN: [id number]    YOB: 1980  Age: 41 y.o. Sex: female          Date of Procedure: 2/25/2021     Preoperative Diagnosis:  right Carpal & Cubital Tunnel Syndrome    Postoperative Diagnosis:  right Carpal & Cubital Tunnel Syndrome     Procedure: right Endoscopic Carpal & Cubital Tunnel Release    IMPLANTS: * No implants in log *    Surgeon:  Lavonne Wolfe III, MD     FIRST ASSISTANT: Luis Angel Guerrero PA-C    SECOND ASSISTANT: Physician Assistant: Anabela Cobian PA-C    Anesthesia: General    Estimated Blood Loss: Minimal    Tourniquet Time:   18   minutes at 250mmHg. Findings: Same    Specimens: None    Complications: None    Indications: This is a 41 y.o. WHITE female who presents with known Carpal & Cubital Tunnel Syndrome documented by EMGs and clinical exam who now presents for surgical correction due to inability to treat the patients problem conservatively. PROCEDURE:The patient was brought to the  operating theater and after adequate anesthesia, the correct upper  extremity was prepped and draped in the typical sterile fashion. The  limb was exsanguinated with an Esmarch bandage and the tourniquet  inflated to 250 mmHg. A 1 inch long straight incision was placed just  behind the medial epicondyle over the cubital tunnel. The incision was  taken down to the subcutaneous tissue to the fascia just above the  ulnar nerve. Using a curved tenotomy scissors the soft tissue  was dissected off the superficial fascia distally and proximally and then  I used Medina scissors to dissect soft tissue off the fascia superficially  for a distance of about 8 cm distally and 8 cm proximally. I went back  to the cubital tunnel and using the pickups and tenotomy scissors, I  opened up the sheath around the ulnar nerve( Osbornes fascia).  Dissection was carried  out circumferentially and it was freed in the cubital tunnel. I used the  large dilator on the [employer] instrumentation system and dilated  the sheath for the ulnar nerve distally to the full depth and  proximally to the full depth. I then assembled the upper and lower  endoscopic cubital tunnel release guides and put lower part underneath  the sheath of the cubital tunnel and once it was underneath the upper  guide was then on top of the fascia. This was then advanced down  until it was fully seated. I placed the scope in the upper guide and  viewed the fascia from above, which showed good view of the fascia  entirely. I then viewed from the lower guide and looked upwards at the  fascia which showed the fascia throughout as well with no nerve  entrapment. I then rotated the cannula around and through the small  slit in the back you could see the ulnar nerve protected and out of  harm's way. The antegrade knife was then placed in the knife channel  of the guide and advancing the scope at the same time as knife it was  advanced completely to the distal extent with full release of the fascia. The guide was then removed and the exact same technique was  carried out proximally going in the upper arm. Once it was released  fully I could place the guide system back in the same area with no  tension as there was before. There was no subluxation of the ulnar  nerve with flexion. The wound was then irrigated out. The skin was  closed with inverted 4-0 Monocryl, and then Mastisol was applied to  the skin and half-inch Steri-Strips were applied. Approximately 10 mL  of 0.25% Marcaine with epinephrine was injected in the skin, distal and  proximal. This was then dressed with 4 x 4's and an Ace wrap. A 1 cm transverse incision was placed at the volar flexion wrist crease centered over the palmaris longus. The incision was deepened to the antebrachial fascia which was released the length of the wound and proximally for an additional 1 cm.   Using the [facility] carpal tunnel release kit, the carpal tunnel was dilated in line with the fourth metacarpal to the distal extent of the transverse carpal ligament. The cannula was then inserted and kept on some proximal radial deviation with the wrist in slight extension, and it was passed all the way to the cardinal line of Melendrez. The scope was then inserted with the wrist in slight extension and the undersurface of the transverse carpal ligament was seen easily from proximal to the distal fatty/adipose tissue at the end of the transverse carpal ligament. Using the [facility] on the [facility] set and using the endoscope for visualization, the transverse carpal ligament was transected in two passes with good release of the ligament outside of view of the cannula. The nerve was never in harms way. The cannula was then withdrawn and placed back in the carpal tunnel with none of the prerelease tension. The wound was then closed with Mastisol on either side and then Steri-Strips were used to re-oppose the skin without the use of stitches. The skin and cut ends of the transverse carpal ligament were anesthetized with 4 to 5 mL of 0.25% Marcaine without epinephrine. This was dressed with two 4 x 4s and an Ace wrap. The tourniquet was deflated and removed, and the patient taken to the recovery room awake and in stable condition. All instrument, sponge and needle counts were correct.           Kai Mathew MD  2/25/2021

## 2021-03-01 ENCOUNTER — HOSPITAL ENCOUNTER (EMERGENCY)
Age: 41
Discharge: HOME | End: 2021-03-01
Attending: EMERGENCY MEDICINE
Payer: COMMERCIAL

## 2021-03-01 ENCOUNTER — APPOINTMENT (OUTPATIENT)
Dept: GENERAL RADIOLOGY | Age: 41
End: 2021-03-01
Attending: EMERGENCY MEDICINE
Payer: COMMERCIAL

## 2021-03-01 VITALS
WEIGHT: 142 LBS | TEMPERATURE: 98.2 F | SYSTOLIC BLOOD PRESSURE: 129 MMHG | BODY MASS INDEX: 24.24 KG/M2 | HEIGHT: 64 IN | DIASTOLIC BLOOD PRESSURE: 89 MMHG | RESPIRATION RATE: 20 BRPM | OXYGEN SATURATION: 100 % | HEART RATE: 83 BPM

## 2021-03-01 DIAGNOSIS — G89.18 ACUTE POST-OPERATIVE PAIN: Primary | ICD-10-CM

## 2021-03-01 LAB
ALBUMIN SERPL-MCNC: 4.1 G/DL (ref 3.4–5)
ALBUMIN/GLOB SERPL: 1.2 {RATIO} (ref 0.8–1.7)
ALP SERPL-CCNC: 96 U/L (ref 45–117)
ALT SERPL-CCNC: 30 U/L (ref 13–56)
ANION GAP SERPL CALC-SCNC: 6 MMOL/L (ref 3–18)
AST SERPL-CCNC: 22 U/L (ref 10–38)
BASOPHILS # BLD: 0 K/UL (ref 0–0.1)
BASOPHILS NFR BLD: 0 % (ref 0–2)
BILIRUB SERPL-MCNC: 0.3 MG/DL (ref 0.2–1)
BUN SERPL-MCNC: 18 MG/DL (ref 7–18)
BUN/CREAT SERPL: 16 (ref 12–20)
CALCIUM SERPL-MCNC: 9.1 MG/DL (ref 8.5–10.1)
CHLORIDE SERPL-SCNC: 102 MMOL/L (ref 100–111)
CO2 SERPL-SCNC: 29 MMOL/L (ref 21–32)
CREAT SERPL-MCNC: 1.12 MG/DL (ref 0.6–1.3)
DIFFERENTIAL METHOD BLD: ABNORMAL
EOSINOPHIL # BLD: 0.4 K/UL (ref 0–0.4)
EOSINOPHIL NFR BLD: 5 % (ref 0–5)
ERYTHROCYTE [DISTWIDTH] IN BLOOD BY AUTOMATED COUNT: 13.2 % (ref 11.6–14.5)
GLOBULIN SER CALC-MCNC: 3.3 G/DL (ref 2–4)
GLUCOSE SERPL-MCNC: 81 MG/DL (ref 74–99)
HCG SERPL QL: NEGATIVE
HCT VFR BLD AUTO: 39.5 % (ref 35–45)
HGB BLD-MCNC: 13.3 G/DL (ref 12–16)
LYMPHOCYTES # BLD: 2.2 K/UL (ref 0.9–3.6)
LYMPHOCYTES NFR BLD: 25 % (ref 21–52)
MCH RBC QN AUTO: 30.1 PG (ref 24–34)
MCHC RBC AUTO-ENTMCNC: 33.7 G/DL (ref 31–37)
MCV RBC AUTO: 89.4 FL (ref 74–97)
MONOCYTES # BLD: 0.7 K/UL (ref 0.05–1.2)
MONOCYTES NFR BLD: 8 % (ref 3–10)
NEUTS SEG # BLD: 5.4 K/UL (ref 1.8–8)
NEUTS SEG NFR BLD: 62 % (ref 40–73)
PLATELET # BLD AUTO: 455 K/UL (ref 135–420)
PMV BLD AUTO: 9.2 FL (ref 9.2–11.8)
POTASSIUM SERPL-SCNC: 4 MMOL/L (ref 3.5–5.5)
PROT SERPL-MCNC: 7.4 G/DL (ref 6.4–8.2)
RBC # BLD AUTO: 4.42 M/UL (ref 4.2–5.3)
SODIUM SERPL-SCNC: 137 MMOL/L (ref 136–145)
WBC # BLD AUTO: 8.8 K/UL (ref 4.6–13.2)

## 2021-03-01 PROCEDURE — 74011250636 HC RX REV CODE- 250/636: Performed by: EMERGENCY MEDICINE

## 2021-03-01 PROCEDURE — 85025 COMPLETE CBC W/AUTO DIFF WBC: CPT

## 2021-03-01 PROCEDURE — 80053 COMPREHEN METABOLIC PANEL: CPT

## 2021-03-01 PROCEDURE — 84703 CHORIONIC GONADOTROPIN ASSAY: CPT

## 2021-03-01 PROCEDURE — 74011250637 HC RX REV CODE- 250/637: Performed by: EMERGENCY MEDICINE

## 2021-03-01 PROCEDURE — 96374 THER/PROPH/DIAG INJ IV PUSH: CPT

## 2021-03-01 PROCEDURE — 73070 X-RAY EXAM OF ELBOW: CPT

## 2021-03-01 PROCEDURE — 99283 EMERGENCY DEPT VISIT LOW MDM: CPT

## 2021-03-01 RX ORDER — FENTANYL CITRATE 50 UG/ML
50 INJECTION, SOLUTION INTRAMUSCULAR; INTRAVENOUS
Status: DISCONTINUED | OUTPATIENT
Start: 2021-03-01 | End: 2021-03-01 | Stop reason: HOSPADM

## 2021-03-01 RX ORDER — ONDANSETRON 2 MG/ML
8 INJECTION INTRAMUSCULAR; INTRAVENOUS
Status: COMPLETED | OUTPATIENT
Start: 2021-03-01 | End: 2021-03-01

## 2021-03-01 RX ORDER — CYCLOBENZAPRINE HCL 10 MG
10 TABLET ORAL
Status: COMPLETED | OUTPATIENT
Start: 2021-03-01 | End: 2021-03-01

## 2021-03-01 RX ORDER — CYCLOBENZAPRINE HCL 10 MG
10 TABLET ORAL
Qty: 12 TAB | Refills: 0 | Status: SHIPPED | OUTPATIENT
Start: 2021-03-01 | End: 2021-11-14

## 2021-03-01 RX ORDER — ONDANSETRON 8 MG/1
8 TABLET, ORALLY DISINTEGRATING ORAL
Qty: 12 TAB | Refills: 0 | Status: SHIPPED | OUTPATIENT
Start: 2021-03-01 | End: 2021-11-14

## 2021-03-01 RX ADMIN — ONDANSETRON 8 MG: 2 INJECTION INTRAMUSCULAR; INTRAVENOUS at 18:24

## 2021-03-01 RX ADMIN — CYCLOBENZAPRINE HYDROCHLORIDE 10 MG: 10 TABLET, FILM COATED ORAL at 19:25

## 2021-03-01 NOTE — ED TRIAGE NOTES
Pt states she recently had surgery of the right arm. Pt reports she had the arm extended and was icing it then felt a pop. Pt states she now has a visible knot and finger numbness and tingling. Reports decreased ROM. States the pain is worse than before her surgery. Pt appears uncomfortable in triage.

## 2021-03-01 NOTE — ED PROVIDER NOTES
EMERGENCY DEPARTMENT HISTORY AND PHYSICAL EXAM    Date: 3/1/2021  Patient Name: Adela Barry    History of Presenting Illness     Chief Complaint   Patient presents with    Arm Injury    Arm Pain         History Provided By: Patient    6:13 PM  Adela Barry is a 41 y.o. female with PMHX of Duke-Danlos syndrome who presents to the emergency department C/O right elbow pain. Per patient she is 5 days postop from wrist and elbow surgery with Dr. Philip Polanco. She reports she was doing some stretching today when she felt a pop in her elbow and now cannot move it and has 2 areas where there are bulges that are extremely tender over her elbow. She has extensive bruising on the inner aspect of her arm but she says it has been there since the surgery. Denies any shortness of breath, fever, cough, vomiting, other injuries. Jeffrey Boyce PCP: Michelle Kumari MD    Current Facility-Administered Medications   Medication Dose Route Frequency Provider Last Rate Last Admin    fentaNYL citrate (PF) injection 50 mcg  50 mcg IntraVENous ONCE PRN Robin Ortega MD        cyclobenzaprine (FLEXERIL) tablet 10 mg  10 mg Oral NOW Robin Ortega MD         Current Outpatient Medications   Medication Sig Dispense Refill    ondansetron (ZOFRAN ODT) 8 mg disintegrating tablet Take 1 Tab by mouth every eight (8) hours as needed for Nausea for up to 12 doses. 12 Tab 0    cyclobenzaprine (FLEXERIL) 10 mg tablet Take 1 Tab by mouth three (3) times daily as needed for Muscle Spasm(s). 12 Tab 0    RABEprazole (Aciphex) 20 mg TbEC Take 20 mg by mouth daily.  testosterone (ANDROGEL) 1 % (25 mg/2.5gram) glpk 2.5 g by TransDERmal route every other day.  LORazepam (Ativan) 1 mg tablet Take 1 mg by mouth every evening.  meloxicam (MOBIC) 15 mg tablet Take 15 mg by mouth daily.  magnesium 250 mg tab Take  by mouth.  melatonin 1 mg tablet Take  by mouth.  zinc sulfate (ZINC-220 PO) Take  by mouth.       ascorbic acid, vitamin C, (Vitamin C) 250 mg tablet Take  by mouth.  buPROPion SR (WELLBUTRIN SR) 200 mg SR tablet Take 300 mg by mouth two (2) times a day.  LEVOMEFOLATE CALCIUM (DEPLIN PO) Take 15 mg by mouth nightly.  cholecalciferol (VITAMIN D3) 1,000 unit cap Take 5,000 Units by mouth daily.  lisdexamfetamine (VYVANSE) 30 mg capsule Take 30 mg by mouth every morningIndications: Attention-Deficit Hyperactivity Disorder. Past History     Past Medical History:  Past Medical History:   Diagnosis Date    Velásquez's esophagus     Duke-Danlos syndrome     GERD (gastroesophageal reflux disease)     Barretts Esophagus    Hiatal hernia     Ill-defined condition     IBSC    Nausea & vomiting     Psychiatric disorder     depression       Past Surgical History:  Past Surgical History:   Procedure Laterality Date    HX ADENOIDECTOMY      HX APPENDECTOMY      HX BREAST AUGMENTATION  2008    HX ORTHOPAEDIC  1994    right shoulder-labrum repair    HX TONSILLECTOMY      NEUROLOGICAL PROCEDURE UNLISTED  2018    spinal fusion       Family History:  No family history on file. Social History:  Social History     Tobacco Use    Smoking status: Never Smoker    Smokeless tobacco: Never Used   Substance Use Topics    Alcohol use: No    Drug use: Yes     Comment: CBD with melatonin-stopped 2 weeks prior to DOS. Allergies: Allergies   Allergen Reactions    Amoxicillin Hives    Carrot Other (comments)     Allergy testing    Gluten Other (comments)    Pineapple Other (comments)     Allergy testing    Pork/Porcine Containing Products Other (comments)     Allergy testing    Scallops Other (comments)     Per allergy testing    Sweet Potato Other (comments)     Allergy testing         Review of Systems   Review of Systems   Constitutional: Negative for fever. Respiratory: Negative for shortness of breath. Cardiovascular: Negative for chest pain.    Musculoskeletal: Positive for arthralgias. All other systems reviewed and are negative. Physical Exam     Vitals:    03/01/21 1806 03/01/21 1838 03/01/21 1840   BP: 129/89     Pulse: 83     Resp: 20     Temp: 98.2 °F (36.8 °C)     SpO2: 100% 100% 100%   Weight: 64.4 kg (142 lb)     Height: 5' 3.5\" (1.613 m)       Physical Exam    Nursing notes and vital signs reviewed    Constitutional: Non toxic appearing, moderate distress  Head: Normocephalic, Atraumatic  Eyes: EOMI  Neck: Supple  Cardiovascular: Regular rate and rhythm, no murmurs, rubs, or gallops  Chest: Normal work of breathing and chest excursion bilaterally  Lungs: Clear to ausculation bilaterally  Back: No evidence of trauma or deformity  Extremities: Ecchymosis over the medial aspect of the right arm, palpable bulges both just proximal and just distal to the elbow that are tender to palpation and firm, distal neurovascular intact with strong radial and ulnar pulses. Surgical wounds clean and dry. Skin: Warm and dry, normal cap refill  Neuro: Alert and appropriate  Psychiatric: Normal mood and affect      Diagnostic Study Results     Labs -     Recent Results (from the past 12 hour(s))   CBC WITH AUTOMATED DIFF    Collection Time: 03/01/21  6:20 PM   Result Value Ref Range    WBC 8.8 4.6 - 13.2 K/uL    RBC 4.42 4.20 - 5.30 M/uL    HGB 13.3 12.0 - 16.0 g/dL    HCT 39.5 35.0 - 45.0 %    MCV 89.4 74.0 - 97.0 FL    MCH 30.1 24.0 - 34.0 PG    MCHC 33.7 31.0 - 37.0 g/dL    RDW 13.2 11.6 - 14.5 %    PLATELET 714 (H) 999 - 420 K/uL    MPV 9.2 9.2 - 11.8 FL    NEUTROPHILS 62 40 - 73 %    LYMPHOCYTES 25 21 - 52 %    MONOCYTES 8 3 - 10 %    EOSINOPHILS 5 0 - 5 %    BASOPHILS 0 0 - 2 %    ABS. NEUTROPHILS 5.4 1.8 - 8.0 K/UL    ABS. LYMPHOCYTES 2.2 0.9 - 3.6 K/UL    ABS. MONOCYTES 0.7 0.05 - 1.2 K/UL    ABS. EOSINOPHILS 0.4 0.0 - 0.4 K/UL    ABS.  BASOPHILS 0.0 0.0 - 0.1 K/UL    DF AUTOMATED     METABOLIC PANEL, COMPREHENSIVE    Collection Time: 03/01/21  6:20 PM   Result Value Ref Range Sodium 137 136 - 145 mmol/L    Potassium 4.0 3.5 - 5.5 mmol/L    Chloride 102 100 - 111 mmol/L    CO2 29 21 - 32 mmol/L    Anion gap 6 3.0 - 18 mmol/L    Glucose 81 74 - 99 mg/dL    BUN 18 7.0 - 18 MG/DL    Creatinine 1.12 0.6 - 1.3 MG/DL    BUN/Creatinine ratio 16 12 - 20      GFR est AA >60 >60 ml/min/1.73m2    GFR est non-AA 53 (L) >60 ml/min/1.73m2    Calcium 9.1 8.5 - 10.1 MG/DL    Bilirubin, total 0.3 0.2 - 1.0 MG/DL    ALT (SGPT) 30 13 - 56 U/L    AST (SGOT) 22 10 - 38 U/L    Alk. phosphatase 96 45 - 117 U/L    Protein, total 7.4 6.4 - 8.2 g/dL    Albumin 4.1 3.4 - 5.0 g/dL    Globulin 3.3 2.0 - 4.0 g/dL    A-G Ratio 1.2 0.8 - 1.7     HCG QL SERUM    Collection Time: 03/01/21  6:20 PM   Result Value Ref Range    HCG, Ql. Negative NEG         Radiologic Studies -   XR ELBOW RT AP/LAT    (Results Pending)     CT Results  (Last 48 hours)    None        CXR Results  (Last 48 hours)    None          Medications given in the ED-  Medications   fentaNYL citrate (PF) injection 50 mcg (has no administration in time range)   cyclobenzaprine (FLEXERIL) tablet 10 mg (has no administration in time range)   ondansetron (ZOFRAN) injection 8 mg (8 mg IntraVENous Given 3/1/21 1824)         Medical Decision Making   I am the first provider for this patient. I reviewed the vital signs, available nursing notes, past medical history, past surgical history, family history and social history. Vital Signs-Reviewed the patient's vital signs. Pulse Oximetry Analysis - 100% on room air, not hypoxic     Records Reviewed: Nursing Notes and Old Medical Records    Provider Notes (Medical Decision Making): Bernarda Alcala is a 41 y.o. female presenting with history and exam consistent with tendon rupture in the postop period. Neurovascular intact. Discussed with orthopedics.   We will add additional pain control and applied a sling with plan for follow-up in orthopedic office tomorrow morning at 8 AM.  Provided with strict return precautions. Patient understands and agrees with this plan. Procedures:  Procedures    ED Course:   CONSULT NOTE:   6:21 PM  Dr. Pedro Luis Dickerson spoke with Dr. Allen Moore   Specialty: Orthopedics  Discussed pt's hx, disposition, and available diagnostic and imaging results over the telephone. Reviewed care plans. Sling and follow up tomorrow at 8am.     7:20 PM  Updated patient on all results and plan. All questions answered. Diagnosis and Disposition     Critical Care: None    DISCHARGE NOTE:    Herrera Cervantes's  results have been reviewed with her. She has been counseled regarding her diagnosis, treatment, and plan. She verbally conveys understanding and agreement of the signs, symptoms, diagnosis, treatment and prognosis and additionally agrees to follow up as discussed. She also agrees with the care-plan and conveys that all of her questions have been answered. I have also provided discharge instructions for her that include: educational information regarding their diagnosis and treatment, and list of reasons why they would want to return to the ED prior to their follow-up appointment, should her condition change. She has been provided with education for proper emergency department utilization. CLINICAL IMPRESSION:    1. Acute post-operative pain        PLAN:  1. D/C Home  2. Current Discharge Medication List      START taking these medications    Details   ondansetron (ZOFRAN ODT) 8 mg disintegrating tablet Take 1 Tab by mouth every eight (8) hours as needed for Nausea for up to 12 doses. Qty: 12 Tab, Refills: 0      cyclobenzaprine (FLEXERIL) 10 mg tablet Take 1 Tab by mouth three (3) times daily as needed for Muscle Spasm(s). Qty: 12 Tab, Refills: 0           3.    Follow-up Information     Follow up With Specialties Details Why Contact Info    Brie Williamson MD Family Medicine Schedule an appointment as soon as possible for a visit   [facility] [place] [id number]  [address] [place] [address] Drive, [address] [place] [address] Richard MD Alex Orthopedic Surgery On 3/2/2021  [address] [address]  [phone]      [facility] EMERGENCY DEPT Emergency Medicine  If symptoms worsen [address] [id number]  [phone]        _______________________________      Please note that this dictation was completed with [employer], the computer voice recognition software. Quite often unanticipated grammatical, syntax, homophones, and other interpretive errors are inadvertently transcribed by the computer software. Please disregard these errors. Please excuse any errors that have escaped final proofreading.

## 2021-03-02 NOTE — ED NOTES
Assumed care of pt at this time. Report received from [place], [facility]. Pt resting in bed in NAD. No needs at this time. Family member at bedside. Call bell in reach.

## 2021-03-19 ENCOUNTER — HOSPITAL ENCOUNTER (OUTPATIENT)
Dept: PREADMISSION TESTING | Age: 41
Discharge: HOME | End: 2021-03-19
Payer: COMMERCIAL

## 2021-03-19 PROCEDURE — U0003 INFECTIOUS AGENT DETECTION BY NUCLEIC ACID (DNA OR RNA); SEVERE ACUTE RESPIRATORY SYNDROME CORONAVIRUS 2 (SARS-COV-2) (CORONAVIRUS DISEASE [COVID-19]), AMPLIFIED PROBE TECHNIQUE, MAKING USE OF HIGH THROUGHPUT TECHNOLOGIES AS DESCRIBED BY CMS-2020-01-R: HCPCS

## 2021-03-20 LAB — SARS-COV-2, COV2NT: NOT DETECTED

## 2021-03-23 PROBLEM — M75.111 INCOMPLETE TEAR OF RIGHT ROTATOR CUFF: Chronic | Status: ACTIVE | Noted: 2021-03-23

## 2021-03-24 RX ORDER — SODIUM CHLORIDE 0.9 % (FLUSH) 0.9 %
5-40 SYRINGE (ML) INJECTION AS NEEDED
Status: CANCELLED | OUTPATIENT
Start: 2021-03-24

## 2021-03-24 RX ORDER — SODIUM CHLORIDE 0.9 % (FLUSH) 0.9 %
5-40 SYRINGE (ML) INJECTION EVERY 8 HOURS
Status: CANCELLED | OUTPATIENT
Start: 2021-03-24

## 2021-03-25 ENCOUNTER — HOSPITAL ENCOUNTER (OUTPATIENT)
Age: 41
Setting detail: OUTPATIENT SURGERY
Discharge: HOME | End: 2021-03-25
Attending: ORTHOPAEDIC SURGERY | Admitting: ORTHOPAEDIC SURGERY
Payer: COMMERCIAL

## 2021-03-25 ENCOUNTER — ANESTHESIA EVENT (OUTPATIENT)
Dept: SURGERY | Age: 41
End: 2021-03-25
Payer: COMMERCIAL

## 2021-03-25 ENCOUNTER — ANESTHESIA (OUTPATIENT)
Dept: SURGERY | Age: 41
End: 2021-03-25
Payer: COMMERCIAL

## 2021-03-25 VITALS
WEIGHT: 137.38 LBS | OXYGEN SATURATION: 96 % | TEMPERATURE: 97.6 F | DIASTOLIC BLOOD PRESSURE: 51 MMHG | HEIGHT: 64 IN | SYSTOLIC BLOOD PRESSURE: 112 MMHG | RESPIRATION RATE: 15 BRPM | BODY MASS INDEX: 23.45 KG/M2 | HEART RATE: 53 BPM

## 2021-03-25 LAB — HCG UR QL: NEGATIVE

## 2021-03-25 PROCEDURE — 74011250636 HC RX REV CODE- 250/636: Performed by: ORTHOPAEDIC SURGERY

## 2021-03-25 PROCEDURE — 74011250636 HC RX REV CODE- 250/636: Performed by: NURSE ANESTHETIST, CERTIFIED REGISTERED

## 2021-03-25 PROCEDURE — 77030020782 HC GWN BAIR PAWS FLX 3M -B: Performed by: ORTHOPAEDIC SURGERY

## 2021-03-25 PROCEDURE — 77030016678 HC BUR SHV4 S&N -B: Performed by: ORTHOPAEDIC SURGERY

## 2021-03-25 PROCEDURE — 77030022877 HC TU IRR ARTHRO PMP ARTH -B: Performed by: ORTHOPAEDIC SURGERY

## 2021-03-25 PROCEDURE — 77030010427: Performed by: ORTHOPAEDIC SURGERY

## 2021-03-25 PROCEDURE — 74011250636 HC RX REV CODE- 250/636: Performed by: PHYSICIAN ASSISTANT

## 2021-03-25 PROCEDURE — 76942 ECHO GUIDE FOR BIOPSY: CPT | Performed by: ORTHOPAEDIC SURGERY

## 2021-03-25 PROCEDURE — 77030034478 HC TU IRR ARTHRO PT ARTH -B: Performed by: ORTHOPAEDIC SURGERY

## 2021-03-25 PROCEDURE — 2709999900 HC NON-CHARGEABLE SUPPLY: Performed by: ORTHOPAEDIC SURGERY

## 2021-03-25 PROCEDURE — 77030002960 HC SUT PASS S&N -C: Performed by: ORTHOPAEDIC SURGERY

## 2021-03-25 PROCEDURE — 76210000026 HC REC RM PH II 1 TO 1.5 HR: Performed by: ORTHOPAEDIC SURGERY

## 2021-03-25 PROCEDURE — 74011250637 HC RX REV CODE- 250/637: Performed by: ANESTHESIOLOGY

## 2021-03-25 PROCEDURE — 76010000138 HC OR TIME 0.5 TO 1 HR: Performed by: ORTHOPAEDIC SURGERY

## 2021-03-25 PROCEDURE — 74011000250 HC RX REV CODE- 250: Performed by: ORTHOPAEDIC SURGERY

## 2021-03-25 PROCEDURE — 76060000032 HC ANESTHESIA 0.5 TO 1 HR: Performed by: ORTHOPAEDIC SURGERY

## 2021-03-25 PROCEDURE — 81025 URINE PREGNANCY TEST: CPT

## 2021-03-25 PROCEDURE — 77030040361 HC SLV COMPR DVT MDII -B: Performed by: ORTHOPAEDIC SURGERY

## 2021-03-25 PROCEDURE — 77030002933 HC SUT MCRYL J&J -A: Performed by: ORTHOPAEDIC SURGERY

## 2021-03-25 PROCEDURE — 74011250636 HC RX REV CODE- 250/636: Performed by: ANESTHESIOLOGY

## 2021-03-25 PROCEDURE — 74011000250 HC RX REV CODE- 250: Performed by: ANESTHESIOLOGY

## 2021-03-25 PROCEDURE — C1713 ANCHOR/SCREW BN/BN,TIS/BN: HCPCS | Performed by: ORTHOPAEDIC SURGERY

## 2021-03-25 PROCEDURE — 77030010801: Performed by: ORTHOPAEDIC SURGERY

## 2021-03-25 PROCEDURE — 74011000250 HC RX REV CODE- 250: Performed by: NURSE ANESTHETIST, CERTIFIED REGISTERED

## 2021-03-25 DEVICE — MULTIFIX S-ULTRA 5.5MM KNOTLESS ANCHOR
Type: IMPLANTABLE DEVICE | Site: SHOULDER | Status: FUNCTIONAL
Brand: MULTIFIX

## 2021-03-25 RX ORDER — DEXTROSE MONOHYDRATE 100 MG/ML
125-250 INJECTION, SOLUTION INTRAVENOUS AS NEEDED
Status: CANCELLED | OUTPATIENT
Start: 2021-03-25

## 2021-03-25 RX ORDER — MAGNESIUM SULFATE 100 %
4 CRYSTALS MISCELLANEOUS AS NEEDED
Status: CANCELLED | OUTPATIENT
Start: 2021-03-25

## 2021-03-25 RX ORDER — CEFAZOLIN SODIUM/WATER 2 G/20 ML
2 SYRINGE (ML) INTRAVENOUS ONCE
Status: COMPLETED | OUTPATIENT
Start: 2021-03-25 | End: 2021-03-25

## 2021-03-25 RX ORDER — SODIUM CHLORIDE 0.9 % (FLUSH) 0.9 %
5-40 SYRINGE (ML) INJECTION AS NEEDED
Status: DISCONTINUED | OUTPATIENT
Start: 2021-03-25 | End: 2021-03-25 | Stop reason: HOSPADM

## 2021-03-25 RX ORDER — SODIUM CHLORIDE, SODIUM LACTATE, POTASSIUM CHLORIDE, CALCIUM CHLORIDE 600; 310; 30; 20 MG/100ML; MG/100ML; MG/100ML; MG/100ML
150 INJECTION, SOLUTION INTRAVENOUS CONTINUOUS
Status: CANCELLED | OUTPATIENT
Start: 2021-03-25

## 2021-03-25 RX ORDER — DEXAMETHASONE SODIUM PHOSPHATE 4 MG/ML
INJECTION, SOLUTION INTRA-ARTICULAR; INTRALESIONAL; INTRAMUSCULAR; INTRAVENOUS; SOFT TISSUE
Status: COMPLETED | OUTPATIENT
Start: 2021-03-25 | End: 2021-03-25

## 2021-03-25 RX ORDER — ROPIVACAINE HYDROCHLORIDE 5 MG/ML
INJECTION, SOLUTION EPIDURAL; INFILTRATION; PERINEURAL
Status: COMPLETED | OUTPATIENT
Start: 2021-03-25 | End: 2021-03-25

## 2021-03-25 RX ORDER — HYDROMORPHONE HYDROCHLORIDE 1 MG/ML
0.2 INJECTION, SOLUTION INTRAMUSCULAR; INTRAVENOUS; SUBCUTANEOUS AS NEEDED
Status: CANCELLED | OUTPATIENT
Start: 2021-03-25

## 2021-03-25 RX ORDER — GLYCOPYRROLATE 0.2 MG/ML
INJECTION INTRAMUSCULAR; INTRAVENOUS AS NEEDED
Status: DISCONTINUED | OUTPATIENT
Start: 2021-03-25 | End: 2021-03-25 | Stop reason: HOSPADM

## 2021-03-25 RX ORDER — SODIUM CHLORIDE 0.9 % (FLUSH) 0.9 %
5-40 SYRINGE (ML) INJECTION EVERY 8 HOURS
Status: CANCELLED | OUTPATIENT
Start: 2021-03-25

## 2021-03-25 RX ORDER — ONDANSETRON 2 MG/ML
4 INJECTION INTRAMUSCULAR; INTRAVENOUS ONCE
Status: CANCELLED | OUTPATIENT
Start: 2021-03-25 | End: 2021-03-25

## 2021-03-25 RX ORDER — INSULIN LISPRO 100 [IU]/ML
INJECTION, SOLUTION INTRAVENOUS; SUBCUTANEOUS ONCE
Status: CANCELLED | OUTPATIENT
Start: 2021-03-25 | End: 2021-03-25

## 2021-03-25 RX ORDER — ONDANSETRON 2 MG/ML
INJECTION INTRAMUSCULAR; INTRAVENOUS AS NEEDED
Status: DISCONTINUED | OUTPATIENT
Start: 2021-03-25 | End: 2021-03-25 | Stop reason: HOSPADM

## 2021-03-25 RX ORDER — CEPHALEXIN 500 MG/1
500 CAPSULE ORAL 4 TIMES DAILY
COMMUNITY
End: 2021-11-14

## 2021-03-25 RX ORDER — NALOXONE HYDROCHLORIDE 0.4 MG/ML
0.1 INJECTION, SOLUTION INTRAMUSCULAR; INTRAVENOUS; SUBCUTANEOUS AS NEEDED
Status: CANCELLED | OUTPATIENT
Start: 2021-03-25

## 2021-03-25 RX ORDER — BUPIVACAINE HYDROCHLORIDE AND EPINEPHRINE 2.5; 5 MG/ML; UG/ML
INJECTION, SOLUTION EPIDURAL; INFILTRATION; INTRACAUDAL; PERINEURAL AS NEEDED
Status: DISCONTINUED | OUTPATIENT
Start: 2021-03-25 | End: 2021-03-25 | Stop reason: HOSPADM

## 2021-03-25 RX ORDER — PROPOFOL 10 MG/ML
INJECTION, EMULSION INTRAVENOUS
Status: DISCONTINUED | OUTPATIENT
Start: 2021-03-25 | End: 2021-03-25 | Stop reason: HOSPADM

## 2021-03-25 RX ORDER — SODIUM CHLORIDE, SODIUM LACTATE, POTASSIUM CHLORIDE, CALCIUM CHLORIDE 600; 310; 30; 20 MG/100ML; MG/100ML; MG/100ML; MG/100ML
125 INJECTION, SOLUTION INTRAVENOUS CONTINUOUS
Status: DISCONTINUED | OUTPATIENT
Start: 2021-03-25 | End: 2021-03-25 | Stop reason: HOSPADM

## 2021-03-25 RX ORDER — FENTANYL CITRATE 50 UG/ML
25 INJECTION, SOLUTION INTRAMUSCULAR; INTRAVENOUS
Status: CANCELLED | OUTPATIENT
Start: 2021-03-25

## 2021-03-25 RX ORDER — SCOLOPAMINE TRANSDERMAL SYSTEM 1 MG/1
1 PATCH, EXTENDED RELEASE TRANSDERMAL ONCE
Status: DISCONTINUED | OUTPATIENT
Start: 2021-03-25 | End: 2021-03-25 | Stop reason: HOSPADM

## 2021-03-25 RX ORDER — DIPHENHYDRAMINE HYDROCHLORIDE 50 MG/ML
12.5 INJECTION, SOLUTION INTRAMUSCULAR; INTRAVENOUS
Status: CANCELLED | OUTPATIENT
Start: 2021-03-25

## 2021-03-25 RX ORDER — DEXMEDETOMIDINE HYDROCHLORIDE 100 UG/ML
INJECTION, SOLUTION INTRAVENOUS
Status: COMPLETED | OUTPATIENT
Start: 2021-03-25 | End: 2021-03-25

## 2021-03-25 RX ORDER — KETAMINE HCL IN 0.9 % NACL 50 MG/5 ML
SYRINGE (ML) INTRAVENOUS AS NEEDED
Status: DISCONTINUED | OUTPATIENT
Start: 2021-03-25 | End: 2021-03-25 | Stop reason: HOSPADM

## 2021-03-25 RX ORDER — PROPOFOL 10 MG/ML
INJECTION, EMULSION INTRAVENOUS AS NEEDED
Status: DISCONTINUED | OUTPATIENT
Start: 2021-03-25 | End: 2021-03-25 | Stop reason: HOSPADM

## 2021-03-25 RX ORDER — SODIUM CHLORIDE 0.9 % (FLUSH) 0.9 %
5-40 SYRINGE (ML) INJECTION AS NEEDED
Status: CANCELLED | OUTPATIENT
Start: 2021-03-25

## 2021-03-25 RX ORDER — HYDROCODONE BITARTRATE AND ACETAMINOPHEN 5; 325 MG/1; MG/1
1 TABLET ORAL AS NEEDED
Status: CANCELLED | OUTPATIENT
Start: 2021-03-25

## 2021-03-25 RX ORDER — MIDAZOLAM HYDROCHLORIDE 1 MG/ML
INJECTION, SOLUTION INTRAMUSCULAR; INTRAVENOUS
Status: COMPLETED | OUTPATIENT
Start: 2021-03-25 | End: 2021-03-25

## 2021-03-25 RX ORDER — LIDOCAINE HYDROCHLORIDE 20 MG/ML
INJECTION, SOLUTION EPIDURAL; INFILTRATION; INTRACAUDAL; PERINEURAL AS NEEDED
Status: DISCONTINUED | OUTPATIENT
Start: 2021-03-25 | End: 2021-03-25 | Stop reason: HOSPADM

## 2021-03-25 RX ORDER — ALBUTEROL SULFATE 0.83 MG/ML
2.5 SOLUTION RESPIRATORY (INHALATION) AS NEEDED
Status: CANCELLED | OUTPATIENT
Start: 2021-03-25

## 2021-03-25 RX ORDER — SODIUM CHLORIDE 0.9 % (FLUSH) 0.9 %
5-40 SYRINGE (ML) INJECTION EVERY 8 HOURS
Status: DISCONTINUED | OUTPATIENT
Start: 2021-03-25 | End: 2021-03-25 | Stop reason: HOSPADM

## 2021-03-25 RX ADMIN — LIDOCAINE HYDROCHLORIDE 100 MG: 20 INJECTION, SOLUTION EPIDURAL; INFILTRATION; INTRACAUDAL; PERINEURAL at 12:23

## 2021-03-25 RX ADMIN — PROPOFOL 20 MG: 10 INJECTION, EMULSION INTRAVENOUS at 12:27

## 2021-03-25 RX ADMIN — GLYCOPYRROLATE 0.2 MG: 0.2 INJECTION INTRAMUSCULAR; INTRAVENOUS at 12:20

## 2021-03-25 RX ADMIN — ROPIVACAINE HYDROCHLORIDE 20 ML: 5 INJECTION, SOLUTION EPIDURAL; INFILTRATION; PERINEURAL at 10:41

## 2021-03-25 RX ADMIN — Medication 10 MG: at 13:00

## 2021-03-25 RX ADMIN — DEXMEDETOMIDINE HYDROCHLORIDE 20 MCG: 100 INJECTION, SOLUTION INTRAVENOUS at 10:41

## 2021-03-25 RX ADMIN — Medication 20 MG: at 12:23

## 2021-03-25 RX ADMIN — PROPOFOL 75 MCG/KG/MIN: 10 INJECTION, EMULSION INTRAVENOUS at 12:32

## 2021-03-25 RX ADMIN — SODIUM CHLORIDE, POTASSIUM CHLORIDE, SODIUM LACTATE AND CALCIUM CHLORIDE 125 ML/HR: 600; 310; 30; 20 INJECTION, SOLUTION INTRAVENOUS at 12:06

## 2021-03-25 RX ADMIN — Medication 10 MG: at 12:35

## 2021-03-25 RX ADMIN — Medication 10 MG: at 12:53

## 2021-03-25 RX ADMIN — CEFAZOLIN 2 G: 1 INJECTION, POWDER, FOR SOLUTION INTRAVENOUS at 12:32

## 2021-03-25 RX ADMIN — MIDAZOLAM HYDROCHLORIDE 4 MG: 1 INJECTION, SOLUTION INTRAMUSCULAR; INTRAVENOUS at 12:20

## 2021-03-25 RX ADMIN — SODIUM CHLORIDE, POTASSIUM CHLORIDE, SODIUM LACTATE AND CALCIUM CHLORIDE 125 ML/HR: 600; 310; 30; 20 INJECTION, SOLUTION INTRAVENOUS at 14:16

## 2021-03-25 RX ADMIN — SODIUM CHLORIDE, POTASSIUM CHLORIDE, SODIUM LACTATE AND CALCIUM CHLORIDE 125 ML/HR: 600; 310; 30; 20 INJECTION, SOLUTION INTRAVENOUS at 09:49

## 2021-03-25 RX ADMIN — ONDANSETRON HYDROCHLORIDE 4 MG: 2 INJECTION INTRAMUSCULAR; INTRAVENOUS at 12:45

## 2021-03-25 RX ADMIN — DEXAMETHASONE SODIUM PHOSPHATE 4 MG: 4 INJECTION, SOLUTION INTRAMUSCULAR; INTRAVENOUS at 10:41

## 2021-03-25 RX ADMIN — PROPOFOL 20 MG: 10 INJECTION, EMULSION INTRAVENOUS at 12:23

## 2021-03-25 RX ADMIN — MIDAZOLAM HYDROCHLORIDE 5 MG: 1 INJECTION, SOLUTION INTRAMUSCULAR; INTRAVENOUS at 10:41

## 2021-03-25 NOTE — OP NOTES
Patient: Galen Rinaldi MRN: [id number]  CSN: [id number]   YOB: 1980  Age: 41 y.o. Sex: female      Date of Surgery:3/25/2021    PREOPERATIVE DIAGNOSES  1. Right shoulder subacromial bursitis/impingement syndrome. 2. 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 3. Right shoulder complete rotator cuff tear. POSTOPERATIVE DIAGNOSES:  1. Right shoulder subacromial bursitis/impingement syndrome. 2. 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 3. Right shoulder complete rotator cuff tear. PROCEDURES PERFORMED  1. Surgical arthroscopy, Right shoulder, with arthroscopic subacromial  Decompression/bursectomy with extensive debridement. 2. Right shoulder arthroscopic distal clavicle excision. 3. Right shoulder arthroscopic rotator cuff repair. 4. Right shoulder suprascapular nerve block by Dr. Nicole Nelson. IMPLANTS:   Implant Name Type Inv. Item Serial No.  Lot No. LRB No. Used Action   ANCHOR SUT DIA5. 5MM KNOTLESS [facility] - [id number]  ANCHOR  [address] 5MM KNOTLESS [facility] ENDOSCOPY_WD [id number] Right 1 Implanted       SURGEON: Juhi Lagos MD    FIRST ASSISTANT: Kelli Leblanc PA-C    SECOND ASSISTANT: Physician Assistant: Jesenia Woody PA-C    ANESTHESIA: General.    COMPLICATIONS: None. ESTIMATED BLOOD LOSS: Minimal.    FINDINGS: Same    SPECIMENS REMOVED: none    INDICATIONS: A 41 y.o.   female with known Right  shoulder bursitis, AC DJD and rotator cuff tear as seen by MRI. The patient has failed all conservative interventions including medications, physical therapy, relative rest  and injections. DESCRIPTION OF PROCEDURE: The patient was brought to the operating theater  and after adequate general anesthesia, the patient was put on the OR table and  underwent general anesthesia and put in the beach chair position.  The Right  shoulder was then examined and then prepped and draped in the typical  sterile fashion. The patient had full range of motion with no instability. Standard anterior, posterior, and midlateral portals were all anesthetized  with 0.25% Marcaine with 1:200,000 epinephrine. The glenohumeral joint was  instilled with 15 mL of the same mixture. The subacromial space was then injected with the same  Mixture/amount. The spinal needle was placed from superior into the spinoglenoid notch. Approximately 10cc's of the Marcaine mixture was placed in this are effectively blocking the suprascapular nerve. The scope was placed posteriorly into the glenohumeral joint and through the rotator interval, an anterior portal was created and a small metal cannula inserted over the Wissinger angelica. Findings at this time showed  Normal articular surfaces. There was a supraspinatus complete rotator cuff  tear that was approximately 1.5cm in width and 1cm retracted. The subscap appeared stretched out from her previous Bankart repair in 1992. There was no option for repair. The biceps anchor, as well as the intra and extra articular biceps was normal with less than 10% fraying at the base and no SLAP. The  scope was withdrawn and placed in the subacromial space. A midlateral  portal was created and the gray cannula inserted. The soft tissue on the  underside of the acromion was then resected with the Incisor Plus blade and the soft tissue / bone was extensively debrided and then using the Helicut bur a lateral and posterior trough were created outlining the decompression area. The bur was then placed posteriorly and the scope in the midlateral portal, and a type 1 acromion was created by the cutting block method, removing about   mm of anterior acromion. The scope was returned to the posterior portal and the bur in the midlateral portal, and the inferior distal 1.0 cm clavicle was  resected.  The bur was then placed anteriorly, and the remaining superior  distal 1.0 cm clavicle was resected. The large-bore threaded cannula was  then placed in the midlateral portal, and the rotator cuff footprint was  denuded with Incisor Plus / Helicut bur down to good bleeding surface. Small holes were made in the rotator cuff footprint for marrow venting. A #2 Ultratape was loaded on the First Pass and passed through a full-thickness bite of the posterior part of the torn rotator cuff. The second lead of the Ultratape was placed in the suture passer and passed just 1 cm from the first pass and brought out the midlateral portal.   The 5.5 Multifix by [employer] was used and the two leads of #2 Ultratape was passed through the anchor and the awl was used to make the hole at the appropriate place on the upper aspect of the greater tuberosity. The anchor was then deployed into the hole keeping the stitches taught. This brought the rotator cuff in good apposition. There was good repair of the rotator cuff to its anatomic   position with the arm at the side and there was no stress of the repair. The space was then decompressed. Each of the portals were closed with 1  horizontal 4-0 Monocryl. They were steri-stripped, had 4 x 4's placed, and  tape. The patient was put in a sling and returned to the recovery room  awake, in stable condition. All instrument, sponge and needle counts were  Correct. A physician assistant was used during the surgery which contributes to better patient outcomes by decreasing operating room time and decreasing the amount of anesthesia the patient had to undergo. The physician assistant helped with positioning and draping of the patient, retraction of soft tissues as necessary so as not to traumatize the tissues. During several parts of the procedure the PA used the arthroscope to help with visualization while I performed the actual surgery.   The PA also used the shaver under my direction with either the Helicut valeria or the suction/shaver attachment to help complete the procedure. During the repair the camera was held by the PA as well as alternating with holding the arm in the correct position so the suture anchors and stitches could be performed by me. The physician assistant instilled local anesthetic which decreased the need for post operative narcotics. During closure the physician assistant was able to close the portals with an inverted 3-0 Monocryl ensuring a water tight closure and decreasing the risk of deep infection. The steristrips and the dressing were applied by the PA to ensure a tight closure. This helps contribute to a lower risk of   infection.       Jorge Bullock MD  3/25/2021

## 2021-03-25 NOTE — INTERVAL H&P NOTE
Update History & Physical 
 
The Patient's History and Physical of March 23, The surgery was reviewed with the patient and I examined the patient. There was no change. The surgical site was confirmed by the patient and me. Plan:  The risk, benefits, expected outcome, and alternative to the recommended procedure have been discussed with the patient. Patient understands and wants to proceed with the procedure.  
 
Electronically signed by Tj Flores MD on 3/25/2021 at 10:05 AM

## 2021-03-25 NOTE — ANESTHESIA PROCEDURE NOTES
Peripheral Block    Start time: 3/25/2021 10:35 AM  End time: 3/25/2021 10:41 AM  Performed by: Nerissa Mccullough MD  Authorized by: Nerissa Mccullough MD       Pre-procedure: Indications: at surgeon's request, post-op pain management and procedure for pain    Preanesthetic Checklist: patient identified, risks and benefits discussed, site marked, timeout performed, anesthesia consent given and patient being monitored      Block Type:   Block Type:   Interscalene  Laterality:  Right  Monitoring:  Standard ASA monitoring, continuous pulse ox, frequent vital sign checks, heart rate, responsive to questions and oxygen  Injection Technique:  Single shot  Procedures: ultrasound guided    Patient Position: seated  Prep: chlorhexidine    Location:  Interscalene  Needle Type:  Stimuplex  Needle Localization:  Anatomical landmarks and ultrasound guidance  Medication Injected:  Midazolam (VERSED) injection, 5 mg  ropivacaine (PF) (NAROPIN)(0.5%) 5 mg/mL injection, 20 mL  dexamethasone (DECADRON) 4 mg/mL injection, 4 mg  dexmedeTOMidine (PRECEDEX) 100 mcg/mL iv solution, 20 mcg  Med Admin Time: 3/25/2021 10:41 AM    Assessment:  Number of attempts:  1  Injection Assessment:  Incremental injection every 5 mL, negative aspiration for CSF, no paresthesia, no intravascular symptoms, negative aspiration for blood, local visualized surrounding nerve on ultrasound, ultrasound image on chart and low pressure verified by pressure monitor  Patient tolerance:  Patient tolerated the procedure well with no immediate complications

## 2021-03-25 NOTE — ANESTHESIA PREPROCEDURE EVALUATION
Relevant Problems   GASTROINTESTINAL   (+) GERD (gastroesophageal reflux disease)       Anesthetic History     PONV          Review of Systems / Medical History  Patient summary reviewed, nursing notes reviewed and pertinent labs reviewed    Pulmonary  Within defined limits                 Neuro/Psych         Psychiatric history     Cardiovascular                  Exercise tolerance: >4 METS     GI/Hepatic/Renal     GERD           Endo/Other        Arthritis     Other Findings              Physical Exam    Airway  Mallampati: II  TM Distance: 4 - 6 cm  Neck ROM: normal range of motion   Mouth opening: Normal     Cardiovascular  Regular rate and rhythm,  S1 and S2 normal,  no murmur, click, rub, or gallop             Dental  No notable dental hx       Pulmonary  Breath sounds clear to auscultation               Abdominal  GI exam deferred       Other Findings            Anesthetic Plan    ASA: 2  Anesthesia type: regional - interscalene block  Risk and benefits fully explained to the patient including bleeding, headache, nerve damage, infection, nausea, back pain, and hemodynamic changes. Patient understands and agrees to the procedure.     Post-op pain plan if not by surgeon: peripheral nerve block single    Induction: Intravenous  Anesthetic plan and risks discussed with: Patient

## 2021-03-25 NOTE — ANESTHESIA POSTPROCEDURE EVALUATION
Procedure(s):  SURGICAL ARTHROSCOPY RIGHT SHOULDER,SUBACROMIAL DECOMPRESSION,DISTAL CLAVICLE EXCISION, ROTATOR CUFF REPAIR, SIMMONS AND NEPHEW.    regional, MAC    Anesthesia Post Evaluation      Multimodal analgesia: multimodal analgesia used between 6 hours prior to anesthesia start to PACU discharge  Patient location during evaluation: PACU  Patient participation: complete - patient participated  Level of consciousness: awake  Pain management: adequate  Airway patency: patent  Anesthetic complications: no  Cardiovascular status: acceptable  Respiratory status: acceptable  Hydration status: acceptable  Post anesthesia nausea and vomiting:  none  Final Post Anesthesia Temperature Assessment:  Normothermia (36.0-37.5 degrees C)      INITIAL Post-op Vital signs:   Vitals Value Taken Time   /51 03/25/21 1445   Temp 36.4 °C (97.6 °F) 03/25/21 1328   Pulse 53 03/25/21 1445   Resp 15 03/25/21 1445   SpO2 96 % 03/25/21 1445

## 2021-03-25 NOTE — DISCHARGE INSTRUCTIONS
DISCHARGE SUMMARY from Nurse    PATIENT INSTRUCTIONS:    After general anesthesia or intravenous sedation, for 24 hours or while taking prescription Narcotics:  · Limit your activities  · Do not drive and operate hazardous machinery  · Do not make important personal or business decisions  · Do  not drink alcoholic beverages  · If you have not urinated within 8 hours after discharge, please contact your surgeon on call. Report the following to your surgeon:  · Excessive pain, swelling, redness or odor of or around the surgical area  · Temperature over 100.5  · Nausea and vomiting lasting longer than 4 hours or if unable to take medications  · Any signs of decreased circulation or nerve impairment to extremity: change in color, persistent  numbness, tingling, coldness or increase pain  · Any questions    What to do at Home:  Recommended activity: as above     If you experience any of the following symptoms as above , please follow up with Doctor Magness Billing. *  Please give a list of your current medications to your Primary Care Provider. *  Please update this list whenever your medications are discontinued, doses are      changed, or new medications (including over-the-counter products) are added. *  Please carry medication information at all times in case of emergency situations. These are general instructions for a healthy lifestyle:    No smoking/ No tobacco products/ Avoid exposure to second hand smoke  Surgeon General's Warning:  Quitting smoking now greatly reduces serious risk to your health.     Obesity, smoking, and sedentary lifestyle greatly increases your risk for illness    A healthy diet, regular physical exercise & weight monitoring are important for maintaining a healthy lifestyle    You may be retaining fluid if you have a history of heart failure or if you experience any of the following symptoms:  Weight gain of 3 pounds or more overnight or 5 pounds in a week, increased swelling in our hands or feet or shortness of breath while lying flat in bed. Please call your doctor as soon as you notice any of these symptoms; do not wait until your next office visit. The discharge information has been reviewed with the patient and spouse. The patient and spouse verbalized understanding. Discharge medications reviewed with the patient and spouse and appropriate educational materials and side effects teaching were provided. ___________________________________________________________________________________________________________________________________         Upper Extremity Surgery Discharge Instructions    Please take the time to review the following instructions before you leave the hospital and use them as guidelines during your recovery from surgery. If you have any questions, you may contact my office at [phone]. Alvin Malone / Chantel Perez may change your dressings as needed. Beginning 2 days after you are discharged from the hospital, you should change your dressings daily. A big, bulky dressing isnt necessary as long as there is no drainage from the incisions. You can put a band-aid or piece of gauze over each incision. It isnt necessary to apply antibiotic ointment to your incisions. If you have steri-strips over you incision, they will start to peel off in 7-10 days as you get them wet. They dont need to be removed before that. When they begin to peel off, you may remove them. Showering / Bathing    You may shower 2 days after your surgery. Your dressing may be removed for showering. You may get your incisions wet in the shower. Do not vigorously scrub your incisions. Apply a clean, dry dressing after you have dried your incisions. Do not take a bath or get into a swimming pool or Jacuzzi until you follow up with Dr. Tiff Page. Do not soak your incisions under water.      Sling    Keep your arm in the immobilizer/sling at all times except when showering and changing your clothes. When showering or changing, keep your arm at your side. Do not move it away from your body. Ice and Elevation    Continue ice consistently for 48 hours after surgery. After 48 hours, you should ice 3 times per day for 20 minutes at a time for the next 5 days. After 1 week from surgery, you may use ice as needed for pain. Diet    You may advance your regular diet as tolerated. Increase your clear liquid intake for the next 2-3 days. Medications    1. You will be given prescriptions for pain medication, inflammation, and nausea when you are discharged from the hospital. Please use the medications as prescribed. Pain medications may cause constipation - Colace or Milk of Magnesia may be used as needed. Other possible side effects of pain medications are dizziness, headache, nausea, vomiting, and urinary retention. Discontinue the pain medication if you develop itching, rash, shortness of breath, or difficulties swallowing. If these symptoms become severe or arent relieved by discontinuing the medication, you should seek immediate medical attention. 2. Refills of pain medication are authorized during office hours only (8AM - 5PM Monday through Friday)  3. If you were prescribed Percocet /Oxycodone, Dilaudid/Hydromorphone or Demerol/Meperidine you must have a written prescription. These medications cannot be leagally called into the pharmacy. 4. Do not take Tylenol/Acetaminophen in addition to your pain medication, as most pain medications already contain this. Do not exceed 3000mg of Tylenol/Acetaminophen per day. 5. You may resume the medication you were taking prior to your surgery. Pain medication may change the effects of any antidepressant medication you may be taking. If you have any questions about possible interactions between your regular medication and the pain medication, you should consult the physician who prescribes your regular medications.       Physical Therapy:    Physical Therapy will be discussed with you at your first follow-up appointment with Dr. Gracie Hamlin. You do not need to start therapy prior to that. Follow up appointment:    Please follow up at your scheduled appointment 7-10 days from the day of your surgery. If you do not already have an appointment, please call our office at [phone]. for your follow up appointment. Important Signs and Symptoms:    If any of the following signs and symptoms occurs, you should contact Dr. Gracie Hamlin' office. Please be advised if a problem arises which you feel requires immediate medical attention or you are unable to contact Dr. Gracie Hamlin' office, you should seek immediate medical attention. Signs and symptoms to watch for include:    1. A sudden increase in swelling and/or redness or warmth at the area of your surgery which isnt relieved by rest, ice, and elevation. 2. Oral temperature greater than 101degrees for 12 hours or more which isnt relieved by an increase in fluid intake and taking two Tylenol every 4-6 hours. 3. Excessive drainage from your incisions, or drainage which hasnt stopped by 72 hours after your surgery. 4. Fever, chills, shortness of breath, chest pain, nausea, vomiting or other signs and symptoms which are of concern to you.     Patient armband removed and shredded

## 2021-03-25 NOTE — PERIOP NOTES
IV attempt x2 to left hand and left AC both unsuccessful. IV attempt by Norma Zheng x2 to left wrist and left FA both unsuccessful. Patient tearful with attempts.

## 2021-03-25 NOTE — PERIOP NOTES
Telehone Discharged  instruction given to [place] and pt and agreed with the plan. Discharged includes diet, activity limitations , medication to continue and f/u appointment. D/c to home in stable condition with care of family. Numbness to R UPPEr opertaive site/ r arm. HAD NERVE BLOCK . EDUCATE about 12 hrs numbness and gradually wear off and start prn pain meds as needed. Agreed with the plan. Reinforce sling at all times and ice pack as directed.

## 2021-03-25 NOTE — ROUTINE PROCESS
Reviewed PTA medication list with patient/caregiver and patient/caregiver denies any additional medications. Patient admits to having a responsible adult care for them at home for at least 24 hours after surgery. Patient encouraged to use gown warming system and informed that using said warming gown to regulate body temperature prior to a procedure has been shown to help reduce the risks of blood clots and infection. Patient's pharmacy of choice verified and documented in PTA medication section. Dual skin assessment & fall risk band verification completed with SERGIO Vázquez. Patient chose personal pharmacy.

## 2021-04-15 ENCOUNTER — HOSPITAL ENCOUNTER (OUTPATIENT)
Dept: PHYSICAL THERAPY | Age: 41
Discharge: HOME | End: 2021-04-15
Payer: COMMERCIAL

## 2021-04-15 PROCEDURE — 97530 THERAPEUTIC ACTIVITIES: CPT

## 2021-04-15 PROCEDURE — 97110 THERAPEUTIC EXERCISES: CPT

## 2021-04-15 PROCEDURE — 97161 PT EVAL LOW COMPLEX 20 MIN: CPT

## 2021-04-15 NOTE — PROGRESS NOTES
PT DAILY TREATMENT NOTE/SHOULDER EVAL     Patient Name: Ancelmo Balbuena  Date:4/15/2021  : 1980  [x]  Patient  Verified  Payor: Karley Cornejo / Plan: Tray ADHIKARI / Product Type: Commerical /    In time: 3:25  Out time: 4:20  Total Treatment Time (min): 55  Visit #: 1 of 24    Treatment Area: Pain radiating to right shoulder [M25.511]    SUBJECTIVE  Pain Level (0-10 scale): 1  [x]constant []intermittent []improving []worsening []no change since onset    Any medication changes, allergies to medications, adverse drug reactions, diagnosis change, or new procedure performed?: [x] No    [] Yes (see summary sheet for update)  Subjective functional status/changes:     PLOF: crossfit, workout with dumbells not very heavy weight, ADLs with no pain and ease  Limitations to PLOF: difficulty with ADLS, not using right arm for anything, cant do hair, shower with 1 hand   Mechanism of Injury: DOS: 3/25/21, onset of pain in  with MRI showing incomplete tear  Current symptoms/Complaints: achey, annoying pain, hand/elbow numbness at night with repositioning helps. Sleeps with HOB elevated, laying flat does not feel good. 1/10 at best with tylenol and heat, 4/10 at worst by end of day. MD 1.5 weeks ago says she does not need sling. Very thick scar tissue at elbow from cubital tunnel release. Right handed.  MD F/u 21  Previous Treatment/Compliance: none  PMHx/Surgical Hx: Bankart repair when 12yo, L4/L5 spinal fusion 10/2017, Right carpal and cubital tunnel release DOS 21, EDS,   Work Hx: [employer] and [employer] , [employer] prof, does a lot of typing, stands up or sits on high chair and moves keyboard very close  Living Situation: wife to help complete ADLs,   Pt Goals: \"resume normal function, get back to working out\"    OBJECTIVE/EXAMINATION    35 min [x]Eval                  []Re-Eval     10 min Therapeutic Exercise:  [x] See flow sheet :   Rationale: increase ROM, increase strength, improve coordination and increase proprioception to improve the patients ability to perform daily activities with decreased pain and symptom levels    10 min Therapeutic Activity:  []  See flow sheet : Pt education on exam findings, HEP, POC, progressing per protocol and what to expect   Rationale: increase ROM and increase strength  to improve the patients ability to perform daily activities with decreased pain and symptom levels           With   [] TE   [] TA   [] neuro   [] other: Patient Education: [x] Review HEP    [] Progressed/Changed HEP based on:   [] positioning   [] body mechanics   [] transfers   [] heat/ice application    [] other:      Other Objective/Functional Measures:    Physical Therapy Evaluation - Shoulder    Posture: [] Poor    [] Fair    [x] Good    Describe:    ROM:  [] Unable to assess at this time                                           AROM                           PROM   Left Right Left Right   Flexion 180   78*   ABD  180   58*   ER @ 90 Degrees 90   @45* 24*   IR @ 90 Degrees 50   @45*45*     End Feel: firm at end range    Strength:   [x] Unable to assess right UE at this time due to protocol                                                                      L (1-5) R (1-5) Pain   Flexors 5  [] Yes   [] No   Abductors 5  [] Yes   [] No   External Rotators 5  [] Yes   [] No   Internal Rotators 5  [] Yes   [] No   Serratus   [] Yes   [] No   Rhomboids   [] Yes   [] No   Lower Trapezius   [] Yes   [] No   Elbow Flexion 5  [] Yes   [] No   Elbow Extension 5  [] Yes   [] No     Scapulohumoral Control / Rhythm: did not assess  Able to eccentrically lower with good control?  Left: [] Yes   [] No     Right: [] Yes   [] No    Palpation  [] Min  [] Mod  [] Severe    Location: not TTP anywhere, anterior insicion sometimes sensitive with bra strap rubbing    Other Tests / Comments:    strength: right 63#, left 80#     Pain Level (0-10 scale) post treatment: 1    ASSESSMENT/Changes in Function: Mrs. Isreal Schumacher is a right hand dominant 33yo female presenting to therapy with c/c right shoulder pain s/p arthroscopic subacromial decompression, distal clavical excision, RCR, and longhead biceps tenodesis DOS 3/25/21. Pt is currently 3 weeks post op and presents with no sling however d/c per MD per pt report. Pt also had right cubital tunnel and carpal tunnel release DOS 2/25/31 with having very thick remaining scar tissue at the medial elbow. Pain increases to 4/10 at worst by the end of the day, and decreases to 1/10 at best with occasional tylenol, heat, and rest with pt describing pain as achey and annoying. Pt demonstrates decreased right shoulder ROM with PROM reaching 78deg flexion, and 24deg ER, decreased left shoulder IR AROM, unable to assess strength at this time due to protocol however decreased  strength, incisions healing well with no s/s of infection. Pt would benefit from skilled PT services to address the above impairments to allow pt to return to PLOF and complete ADLs with increased ease. Patient will continue to benefit from skilled PT services to modify and progress therapeutic interventions, address functional mobility deficits, address ROM deficits, address strength deficits, analyze and address soft tissue restrictions, analyze and cue movement patterns, analyze and modify body mechanics/ergonomics, assess and modify postural abnormalities and instruct in home and community integration to attain remaining goals. []  See Plan of Care  []  See progress note/recertification  []  See Discharge Summary         Progress towards goals / Updated goals:  Short Term Goals: STG- To be accomplished in 4 week(s):  1. Pt will be independent with HEP to encourage prophylaxis. Eval: HEP dispensed     2. Pt right shoulder flexion PROM will achieve 150deg to demonstrate improved shoulder mobility to be able to progress per protocol.   Eval: right shoulder flexion PROM 78deg    Long Term Goals: LTG- To be accomplished in 8 week(s):  1. Pt will report >/=80% improvement in symptoms to be able to complete ADLs with increased ease. Eval: 4/10 pain at worst by end of day. Unable to put hair up and complete ADLs with ease    2. Pt bilateral shoulder strength will improve in all planes to 5/5 MMT to allow pt to return to working out. Eval: left UE all planes 5/5, unable to assess right UE due to protocol. Will assess when appropriate    3. Pt right  strength will be equal to the left to allow pt to lift weights working out with increased ease. Eval: right 63#, left 80#    4. Pt right shoulder [facility] to allow pt to shower and put hair up with increased ease. Eval:  PROM Right   Flexion 78*   ABD  58*   ER @ 90 Degrees @45* 24*   IR @ 90 Degrees @45*45*     5. Pt FOTO score will increase by >/= 26 points to show improvement in subjective function.   Eval: 43  Current: will address at visit 5      PLAN  []  Upgrade activities as tolerated     [x]  Continue plan of care  []  Update interventions per flow sheet       []  Discharge due to:_  []  Other:_      Balta Travis, SPT 4/15/2021  3:09 PM

## 2021-04-15 NOTE — PROGRESS NOTES
[facility] at the [address], [place], [address] [facility]  Phone: [phone]      Fax:  [phone]       Plan of Care/ Statement of Necessity for Physical Therapy Services      Patient name: Arlet Lipscomb Start of Care: 4/15/2021   Referral source: Jaylene Sullivan MD : 1980    Medical Diagnosis: Pain radiating to right shoulder [M25.511]   Onset Date:DOS 3/25/21    Treatment Diagnosis: right shoulder pain   Prior Hospitalization: see medical history Provider#: [id number]   Medications: Verified on Patient summary List    Comorbidities: Bankart repair when 14yo, L4/L5 spinal fusion 10/2017, Right carpal and cubital tunnel release DOS 21, EDS, depression   Prior Level of Function: working out, crossfit, complete ADLs with ease and no pain      The Plan of Care and following information is based on the information from the initial evaluation. Assessment/ sharp information: Mrs. Arlet Lipscomb is a right hand dominant 33yo female presenting to therapy with c/c right shoulder pain s/p arthroscopic subacromial decompression, distal clavical excision, RCR, and longhead biceps tenodesis DOS 3/25/21. Pt is currently 3 weeks post op and presents with no sling however d/c per MD per pt report. Pt also had right cubital tunnel and carpal tunnel release DOS 31 with having very thick remaining scar tissue at the medial elbow. Pain increases to 4/10 at worst by the end of the day, and decreases to 1/10 at best with occasional tylenol, heat, and rest with pt describing pain as achey and annoying. Pt demonstrates decreased right shoulder ROM with PROM reaching 78deg flexion, and 24deg ER, decreased left shoulder IR AROM, unable to assess strength at this time due to protocol however decreased  strength, incisions healing well with no s/s of infection.  Pt would benefit from skilled PT services to address the above impairments to allow pt to return to PLOF and complete ADLs with increased ease. Evaluation Complexity History HIGH Complexity :3+ comorbidities / personal factors will impact the outcome/ POC ; Examination LOW Complexity : 1-2 Standardized tests and measures addressing body structure, function, activity limitation and / or participation in recreation  ;Presentation LOW Complexity : Stable, uncomplicated  ;Clinical Decision Making MEDIUM Complexity : FOTO score of 26-74  Overall Complexity Rating: LOW   Problem List: pain affecting function, decrease ROM, decrease strength, decrease ADL/ functional abilitiies, decrease activity tolerance and decrease flexibility/ joint mobility   Treatment Plan may include any combination of the following: Therapeutic exercise, Therapeutic activities, Neuromuscular re-education, Physical agent/modality, Manual therapy, Patient education, Self Care training and Functional mobility training  Patient / Family readiness to learn indicated by: asking questions, trying to perform skills and interest  Persons(s) to be included in education: patient (P)  Barriers to Learning/Limitations: None  Patient Goal (s): resume normal function, get back to working out  Patient Self Reported Health Status: good  Rehabilitation Potential: good  Short Term Goals: STG- To be accomplished in 4 week(s):  1. Pt will be independent with HEP to encourage prophylaxis. Eval: HEP dispensed     2. Pt right shoulder flexion PROM will achieve 150deg to demonstrate improved shoulder mobility to be able to progress per protocol. Eval: right shoulder flexion PROM 78deg    Long Term Goals: LTG- To be accomplished in 8 week(s):  1. Pt will report >/=80% improvement in symptoms to be able to complete ADLs with increased ease. Eval: 4/10 pain at worst by end of day. Unable to put hair up and complete ADLs with ease    2. Pt bilateral shoulder strength will improve in all planes to 5/5 MMT to allow pt to return to working out.   Eval: left UE all planes 5/5, unable to assess right UE due to protocol. Will assess when appropriate    3. Pt right  strength will be equal to the left to allow pt to lift weights working out with increased ease. Eval: right 63#, left 80#    4. Pt right shoulder [facility] to allow pt to shower and put hair up with increased ease. Eval:  PROM Right   Flexion 78*   ABD  58*   ER @ 90 Degrees @45* 24*   IR @ 90 Degrees @45*45*     5. Pt FOTO score will increase by >/= 26 points to show improvement in subjective function. Eval: 43  Current: will address at visit 5    Frequency / Duration: Patient to be seen 1 times per week for 3 weeks, then 2 times per week for 24 visits total    Patient/ Caregiver education and instruction: Diagnosis, prognosis, self care, activity modification and exercises   [x]  Plan of care has been reviewed with PORTIA Avila, SPT 4/15/2021 3:13 PM  _____________________________________________________________________  I certify that the above Therapy Services are being furnished while the patient is under my care. I agree with the treatment plan and certify that this therapy is necessary.     Physician's Signature:____________Date:_________TIME:________                                      Lory Cee MD    ** Signature, Date and Time must be completed for valid certification **    Please sign and return to [facility] at the [address], [facility], [address] [facility]       Phone: [phone]      Fax:  [phone]

## 2021-04-20 ENCOUNTER — HOSPITAL ENCOUNTER (OUTPATIENT)
Dept: PHYSICAL THERAPY | Age: 41
Discharge: HOME | End: 2021-04-20
Payer: COMMERCIAL

## 2021-04-20 PROCEDURE — 97140 MANUAL THERAPY 1/> REGIONS: CPT

## 2021-04-20 PROCEDURE — 97016 VASOPNEUMATIC DEVICE THERAPY: CPT

## 2021-04-20 PROCEDURE — 97112 NEUROMUSCULAR REEDUCATION: CPT

## 2021-04-20 PROCEDURE — 97110 THERAPEUTIC EXERCISES: CPT

## 2021-04-20 NOTE — PROGRESS NOTES
PT DAILY TREATMENT NOTE    Patient Name: Marcell Little  Date:2021  : 1980  [x]  Patient  Verified  Payor: Yanira Bunn / Plan: Constantino Slice RPN / Product Type: Commerical /    In time:2:34 pm  Out time:3:38 pm   Total Treatment Time (min): 64  Total Timed Codes (min): 54  Visit #: 2 of 24    Treatment Area: Pain radiating to right shoulder [M25.511]    SUBJECTIVE  Pain Level (0-10 scale): 0-1  Any medication changes, allergies to medications, adverse drug reactions, diagnosis change, or new procedure performed?: [x] No    [] Yes (see summary sheet for update)  Subjective functional status/changes:   [] No changes reported  \"It is ok. The one rasing my arm over head freaks me out. \"     OBJECTIVE    Modalities Rationale:     decrease inflammation and decrease pain to improve patient's ability to perform daily activities with decreased pain and symptom levels     min [] Estim, type/location:                                      []  att     []  unatt     []  w/US     []  w/ice    []  w/heat    min []  Mechanical Traction: type/lbs                   []  pro   []  sup   []  int   []  cont    []  before manual    []  after manual    min []  Ultrasound, settings/location:      min []  Iontophoresis w/ dexamethasone, location:                                               []  take home patch       []  in clinic    min []  Ice     []  Heat    location/position:    10 min [x]  Vasopneumatic Device, press/temp:  To right shoulder in sitting.    min []  Other:    [x] Skin assessment post-treatment (if applicable):    [x]  intact    []  redness- no adverse reaction     []redness  adverse reaction:          20 min Therapeutic Exercise:  [x] See flow sheet :   Rationale: increase ROM, increase strength, improve coordination and increase proprioception to improve the patients ability to perform daily activities with decreased pain and symptom levels      10 min Neuromuscular Re-education:  [x]  See flow sheet : Rationale: increase ROM, increase strength, improve coordination and increase proprioception  to improve the patients ability to perform daily activities with decreased pain and symptom levels       24 min Manual Therapy:  Scap mobs in left S/L  PROM Scapular PNFs in left S/L  STM to right infraspinatus, lat and subscap in SL  PROM with scap stabilized in left S/L  sibsons fascial release to right side  Gentle rib mobilizations with active inhale/exhale  Right apical expansion    Rationale: decrease pain, increase ROM, increase tissue extensibility, decrease trigger points and increase postural awareness to improve the patients ability to perform daily activities with decreased pain and symptom levels    The manual therapy interventions were performed at a separate and distinct time from the therapeutic activities interventions. With   [] TE   [] TA   [] neuro   [] other: Patient Education: [x] Review HEP    [] Progressed/Changed HEP based on:   [] positioning   [] body mechanics   [] transfers   [] heat/ice application    [] other:      Other Objective/Functional Measures:   PROM Right shoulder flexion: 104* (measured in S/L)     Pain Level (0-10 scale) post treatment: 0    ASSESSMENT/Changes in Function:   Pt tolerated session well. Progressed per evidenced based protocol and pt tolerance. Responded well to S/L manual. Very straight through thoracic spine and lumbar spine. Noted decreased muscle mass at infraspinatus. Instructed to add manual resisted isometrics - Flex, ER and IR - return demonstrated understanding.      Patient will continue to benefit from skilled PT services to modify and progress therapeutic interventions, address functional mobility deficits, address ROM deficits, address strength deficits, analyze and address soft tissue restrictions, analyze and cue movement patterns, analyze and modify body mechanics/ergonomics, assess and modify postural abnormalities and instruct in home and community integration to attain remaining goals. []  See Plan of Care  []  See progress note/recertification  []  See Discharge Summary         Progress towards goals / Updated goals:  Short Term Goals: STG- To be accomplished in 4 week(s):  1. Pt will be independent with HEP to encourage prophylaxis. Eval: HEP dispensed   Current: Progressing 4/20/21 per pt report      2. Pt right shoulder flexion PROM will achieve 150deg to demonstrate improved shoulder mobility to be able to progress per protocol. Eval: right shoulder flexion PROM 78deg  Current: Progressing 4/20/12 PROM Right shoulder flexion: 104* (measured in S/L)      Long Term Goals: LTG- To be accomplished in 8 week(s):  1. Pt will report >/=80% improvement in symptoms to be able to complete ADLs with increased ease. Eval: 4/10 pain at worst by end of day. Unable to put hair up and complete ADLs with ease     2. Pt bilateral shoulder strength will improve in all planes to 5/5 MMT to allow pt to return to working out. Eval: left UE all planes 5/5, unable to assess right UE due to protocol. Will assess when appropriate     3. Pt right  strength will be equal to the left to allow pt to lift weights working out with increased ease. Eval: right 63#, left 80#     4.  Pt [facility] allow pt to shower and put hair up with increased ease. Eval:  PROM Right   Flexion 78*   ABD  58*   ER @ 90 Degrees @45* 24*   IR @ 90 Degrees @45*45*      5.  Pt FOTO score will increase by >/= 26 points to show improvement in subjective function.   Eval: 43  Current: will address at visit 5         PLAN  [x]  Upgrade activities as tolerated     []  Continue plan of care  []  Update interventions per flow sheet       []  Discharge due to:_  []  Other:_      Paloma Schulz, PT, DPT 4/20/2021  2:35 PM    Future Appointments   Date Time Provider Juliette Ames   4/27/2021 11:00 AM Maxx Lim, PT [facility]   5/5/2021  2:00 PM Simi Joe [facility]   5/12/2021  8:45 AM Elenor Abt, PT, DPT [facility]   5/14/2021  8:45 AM RemesicNancy [facility]   5/19/2021  8:45 AM Elenor Abt, PT, DPT [facility]   5/21/2021  8:45 AM RemesicNancy [facility]   5/26/2021  8:45 AM RemesicNancy [facility]   5/28/2021  8:45 AM RemesicNancy [facility]

## 2021-04-27 ENCOUNTER — HOSPITAL ENCOUNTER (OUTPATIENT)
Dept: PHYSICAL THERAPY | Age: 41
Discharge: HOME | End: 2021-04-27
Payer: COMMERCIAL

## 2021-04-27 PROCEDURE — 97140 MANUAL THERAPY 1/> REGIONS: CPT

## 2021-04-27 PROCEDURE — 97112 NEUROMUSCULAR REEDUCATION: CPT

## 2021-04-27 PROCEDURE — 97110 THERAPEUTIC EXERCISES: CPT

## 2021-04-27 PROCEDURE — 97016 VASOPNEUMATIC DEVICE THERAPY: CPT

## 2021-04-27 NOTE — PROGRESS NOTES
PT DAILY TREATMENT NOTE    Patient Name: Hamilton Hughes  Date:2021  : 1980  [x]  Patient  Verified  Payor: Gisselle Velazco / Plan: Ryan ADHIKARI / Product Type: Commerical /    In time:11:06  Out time:12:02  Total Treatment Time (min): 56  Total Timed Codes (min): 46  1:1 Treatment Time ( only): 55   Visit #: 3 of 24    Treatment Area: Pain radiating to right shoulder [M25.511]    SUBJECTIVE  Pain Level (0-10 scale): 1/10  Any medication changes, allergies to medications, adverse drug reactions, diagnosis change, or new procedure performed?: [x] No    [] Yes (see summary sheet for update)  Subjective functional status/changes:   [] No changes reported  Pt reports that she felt good after last therapy session, like the therapist put her shoulder blade back in the right place. Reports that she is wondering if she can lift a 3# dumbbell for biceps. Reports that her wrist has been really tight but once she uses a massager at the scar, it feels looser. Reports that the elbow is bothering her some as well and using it can help. Reports that her upper trap is bothering her some and collar bone is a little bothersome. Reports she was able to do her hair this morning upside down. Reports that she is compliant with HEP.      OBJECTIVE:  Pt enters gym in no apparent distress, no sling    Modalities Rationale:     decrease edema, decrease inflammation, decrease pain and increase tissue extensibility to improve patient's ability to improve patient's ability to perform pain free ADL's    min [] Estim, type/location:                                      []  att     []  unatt     []  w/US     []  w/ice    []  w/heat    min []  Mechanical Traction: type/lbs                   []  pro   []  sup   []  int   []  cont    []  before manual    []  after manual    min []  Ultrasound, settings/location:      min []  Iontophoresis w/ dexamethasone, location:                                               []  take home patch []  in clinic   5 min []  Ice     [x]  Heat    location/position: Moist heat right shoulder prior to therapy while getting history and performing education-billed with pt education/ther ex   10 min [x]  Vasopneumatic Device, press/temp: Pt is reclined position on table, right shoulder, 34 deg, low pressure    min []  Other:    [] Skin assessment post-treatment (if applicable):    []  intact    []  redness- no adverse reaction     []redness  adverse reaction:            21 min Therapeutic Exercise:  [x] See flow sheet :   Rationale: increase ROM and increase strength to improve the patients ability to perform daily activities with decreased pain and symptom levels       10 min Neuromuscular Re-education:  [x]  See flow sheet :   Rationale: improve coordination, improve balance, and increase proprioception  to improve the patients ability to perform daily activities with decreased pain and symptom levels. 15 min Manual Therapy:  Inf/medial/caudal Scap mobs in left S/L  PROM Scapular PNFs in left S/L  MFR intramuscular to right upper trap, teres minor, infraspinatus, lat and subscap in left SL  Glenohumeral clocks (sup/inf/ant/post/CW/CCW), PROM sh flexion, scaption with pt supine   Rationale: increase tissue extensibility, decrease trigger points, and increase postural awareness to improve the patients ability to perform daily activities with decreased pain and symptom levels. The manual therapy interventions were performed at a separate and distinct time from the therapeutic activities interventions.             With   [x] TE   [] TA   [] neuro   [] other: Patient Education: [x] Review HEP    [] Progressed/Changed HEP based on:   [] positioning   [] body mechanics   [] transfers   [] heat/ice application    [] other:      Other Objective/Functional Measures:  supine: PROM Right shoulder flexion: 110, AAROM with wand: 90 degrees; PROM scaption: 90 degrees     Pain Level (0-10 scale) post treatment: 2/10    ASSESSMENT/Changes in Function: Patient tolerated therapy session well as there were no adverse reactions today. Pt with muscle hypertonicity so performed MFR. Pt is progressing with therapy as pt with inc in PROM. :Pt had a difficult time with performing quad SA, noted to have decreased mobility on the right. Pt also tolerating increase in exercise repetitions and resistance. Although showing progress patient would benefit from continuation of skilled physical therapy to address the remaining limitations. Patient will continue to benefit from skilled PT services to modify and progress therapeutic interventions, address functional mobility deficits, address ROM deficits, address strength deficits, analyze and address soft tissue restrictions, analyze and cue movement patterns, analyze and modify body mechanics/ergonomics and assess and modify postural abnormalities to attain remaining goals. [x]  See Plan of Care  [x]  See progress note/recertification  []  See Discharge Summary         Progress towards goals / Updated goals:  Short Term Goals: STG- To be accomplished in 4 week(s):  1.  Pt will be independent with HEP to encourage prophylaxis. Eval: HEP dispensed   Current: 4/27/21: Pt reports compliance      2. Pt right shoulder flexion PROM will achieve 150deg to demonstrate improved shoulder mobility to be able to progress per protocol. Eval: right shoulder flexion PROM 78deg  Current: Progressing 4/27/12   supine: PROM Right shoulder flexion: 110, AAROM with wand: 90 degrees; PROM scaption: 90 degrees              Long Term Goals: LTG- To be accomplished in 8 week(s):  1.  Pt will report >/=80% improvement in symptoms to be able to complete ADLs with increased ease. Eval: 4/10 pain at worst by end of day. Unable to put hair up and complete ADLs with ease     2.  Pt bilateral shoulder strength will improve in all planes to 5/5 MMT to allow pt to return to working out.   Eval: left UE all planes 5/5, unable to assess right UE due to protocol. Will assess when appropriate     3. Pt right  strength will be equal to the left to allow pt to lift weights working out with increased ease. Eval: right 63#, left 80#     4.  Pt right shoulder AROM will improve to WFL to allow pt to shower and put hair up with increased ease. Eval:  PROM Right   Flexion 78*   ABD  58*   ER @ 90 Degrees @45* 24*   IR @ 90 Degrees @45*45*      5.  Pt FOTO score will increase by >/= 26 points to show improvement in subjective function.   Eval: 43  Current: will address at visit 5      PLAN  [x]  Upgrade activities as tolerated     [x]  Continue plan of care  [x]  Update interventions per flow sheet       []  Discharge due to:_  []  Other:_      Nichole Marie, PT, DPT, CIMT 4/27/2021  11:08 AM    Future Appointments   Date Time Provider Juliette Ames   5/5/2021  2:00 PM Navneet Mota [facility]   5/12/2021  8:45 AM Mandy Suero, PT, DPT [facility]   5/14/2021  8:45 AM Navneet Mota [facility]   5/19/2021  8:45 AM Mandy Suero PT, DPT [facility]   5/21/2021  8:45 AM Navneet Mota [facility]   5/26/2021  8:45 AM Navneet Mota [facility]   5/28/2021  8:45 AM Navneet Mota [facility]

## 2021-05-05 ENCOUNTER — HOSPITAL ENCOUNTER (OUTPATIENT)
Dept: PHYSICAL THERAPY | Age: 41
Discharge: HOME | End: 2021-05-05
Payer: COMMERCIAL

## 2021-05-05 PROCEDURE — 97140 MANUAL THERAPY 1/> REGIONS: CPT

## 2021-05-05 PROCEDURE — 97110 THERAPEUTIC EXERCISES: CPT

## 2021-05-05 NOTE — PROGRESS NOTES
PT DAILY TREATMENT NOTE    Patient Name: Javon Guerra  Date:2021  : 1980  [x]  Patient  Verified  Payor: Jackeline Fullerton / Plan: Corina Foss RPN / Product Type: Commerical /    In time:2:05  Out time:3:05  Total Treatment Time (min): 60  Total Timed Codes (min): 60  1:1 Treatment Time (MC only): 60   Visit #: 4 of 24    Treatment Area: Pain radiating to right shoulder [M25.511]    SUBJECTIVE  Pain Level (0-10 scale): 0  Any medication changes, allergies to medications, adverse drug reactions, diagnosis change, or new procedure performed?: [x] No    [] Yes (see summary sheet for update)  Subjective functional status/changes:   [] No changes reported  \"No pain just tight. I had a massage and that really helped my traps and my neck. \"    OBJECTIVE      50 min Therapeutic Exercise:  [x] See flow sheet :   Rationale: increase ROM and increase strength to improve the patients ability to perform daily activities with decreased pain and symptom levels    10 min Manual Therapy:  scap mobs in s/l, rib mobs with breaths to improve apical expansion bilaterally, PROM right shoulder all planes in supine   Rationale: decrease pain, increase ROM and increase tissue extensibility to improve the patients ability to perform daily activities with decreased pain and symptom levels  The manual therapy interventions were performed at a separate and distinct time from the therapeutic activities interventions.           With   [] TE   [] TA   [] neuro   [] other: Patient Education: [x] Review HEP    [] Progressed/Changed HEP based on:   [] positioning   [] body mechanics   [] transfers   [] heat/ice application    [] other:      Other Objective/Functional Measures:   Right shoulder AROM flexion 122*, PROM 132*  PROM right shoulder abduction 90*   Visible fatigue in QP and SB rollouts    Pain Level (0-10 scale) post treatment: 0    ASSESSMENT/Changes in Function: Pt will be 6 weeks post 21 and is progressing per protocol well. Added pulleys and UBE without resistance today with good tolerance. Pt continues to be challenged decreasing UT compensation with exercises needing cues to correct. Decreased thoracic protraction noted in QP however improved SA recruitment with visible fatigue with QP taps. Decreased bilateral apical expansion left > right however improved with rib pumping. Will continue to progress as tolerated per protocol. Patient will continue to benefit from skilled PT services to modify and progress therapeutic interventions, address functional mobility deficits, address ROM deficits, address strength deficits, analyze and address soft tissue restrictions, analyze and cue movement patterns, analyze and modify body mechanics/ergonomics, assess and modify postural abnormalities and instruct in home and community integration to attain remaining goals. []  See Plan of Care  []  See progress note/recertification  []  See Discharge Summary         Progress towards goals / Updated goals:  Short Term Goals: STG- To be accomplished in 4 week(s):  1.  Pt will be independent with HEP to encourage prophylaxis. Eval: HEP dispensed   Current: compliance per pt report goal MET 5/5/21      2. Pt right shoulder flexion PROM will achieve 150deg to demonstrate improved shoulder mobility to be able to progress per protocol. Eval: right shoulder flexion PROM 78deg  Current: 132* PROM progressing 5/5/21              Long Term Goals: LTG- To be accomplished in 8 week(s):  1.  Pt will report >/=80% improvement in symptoms to be able to complete ADLs with increased ease. Eval: 4/10 pain at worst by end of day. Unable to put hair up and complete ADLs with ease     2.  Pt bilateral shoulder strength will improve in all planes to 5/5 MMT to allow pt to return to working out. Eval: left UE all planes 5/5, unable to assess right UE due to protocol. Will assess when appropriate     3.  Pt right  strength will be equal to the left to allow pt to lift weights working out with increased ease. Eval: right 63#, left 80#     4.  Pt right shoulder AROM will improve to WFL to allow pt to shower and put hair up with increased ease. Eval:  PROM Right   Flexion 78*   ABD  58*   ER @ 90 Degrees @45* 24*   IR @ 90 Degrees @45*45*      Current: right shoulder AROM flexion 122* progressing 5/5/21    5.  Pt FOTO score will increase by >/= 26 points to show improvement in subjective function.   Eval: 43  Current: will address at visit 5          PLAN  []  Upgrade activities as tolerated     [x]  Continue plan of care  []  Update interventions per flow sheet       []  Discharge due to:_  []  Other:_      Richard Bold 5/5/2021  2:10 PM    Future Appointments   Date Time Provider Juliette Ames   5/12/2021  9:30 AM Sari Wellington, PT, DPT [facility]   5/14/2021 11:00 AM Nancy Mota [facility]   5/19/2021  1:00 PM Sari Wellington, PT, DPT [facility]   5/21/2021 11:45 AM Suzanna Williamson PT [facility]   5/26/2021  2:30 PM Suzanna Williamson PT [facility]   5/28/2021 11:45 AM Suzanna Williamson PT [facility]

## 2021-05-12 ENCOUNTER — HOSPITAL ENCOUNTER (OUTPATIENT)
Dept: PHYSICAL THERAPY | Age: 41
Discharge: HOME | End: 2021-05-12
Payer: COMMERCIAL

## 2021-05-12 PROCEDURE — 97112 NEUROMUSCULAR REEDUCATION: CPT

## 2021-05-12 PROCEDURE — 97110 THERAPEUTIC EXERCISES: CPT

## 2021-05-12 PROCEDURE — 97140 MANUAL THERAPY 1/> REGIONS: CPT

## 2021-05-12 NOTE — PROGRESS NOTES
PT DAILY TREATMENT NOTE    Patient Name: Tobias Flores  Date:2021  : 1980  [x]  Patient  Verified  Payor: Lydia Jeter / Plan: Khadra ADHIKARI / Product Type: Commerical /    In time:9:32 am  Out time:10:30 am  Total Treatment Time (min): 58  Total Timed Codes (min): 58  Visit #: 5 of 24    Treatment Area: Pain radiating to right shoulder [M25.511]    SUBJECTIVE  Pain Level (0-10 scale): 0  Any medication changes, allergies to medications, adverse drug reactions, diagnosis change, or new procedure performed?: [x] No    [] Yes (see summary sheet for update)  Subjective functional status/changes:   [] No changes reported  \"Ok. Some random pains in my pec and across the front. \"    OBJECTIVE      28 min Therapeutic Exercise:  [x] See flow sheet :   Rationale: increase ROM, increase strength, improve coordination and increase proprioception to improve the patients ability to perform daily activities with decreased pain and symptom levels       12 min Neuromuscular Re-education:  [x]  See flow sheet :included scapular PNF's in S/L   PROM PNFs in supine    Rationale: increase ROM, increase strength, improve coordination and increase proprioception  to improve the patients ability to perform daily activities with decreased pain and symptom levels      18 min Manual Therapy:  Scap mobs in left S/L  PROM Scapular PNFs in left S/L  STM to right infraspinatus, lat and subscap in SL  PROM with scap stabilized in left S/L  Gentle rib mobilizations with active inhale/exhale  Right apical expansion    Rationale: decrease pain, increase ROM, increase tissue extensibility and increase postural awareness to improve the patients ability to perform daily activities with decreased pain and symptom levels    The manual therapy interventions were performed at a separate and distinct time from the therapeutic activities interventions.           With   [x] TE   [] TA   [] neuro   [] other: Patient Education: [x] Review HEP [] Progressed/Changed HEP based on:   [] positioning   [] body mechanics   [] transfers   [] heat/ice application    [] other:      Other Objective/Functional Measures:   Right Shoulder PROM:  Flexion 140* ( S/L)  IR @ 45* Abd: 63  ER @ 45* Abd:54    Noted atrophy at right infraspinatus and subscapularis with manual.     FOTO: 48    Pain Level (0-10 scale) post treatment: 0    ASSESSMENT/Changes in Function:   Pt has been seen 5 visits including initial evaluation S/P right Shoulder arthroscopic subacromial decompression, distal clavical excision, RCR, and longhead biceps tenodesis DOS 3/25/21. Pt also had right cubital tunnel and carpal tunnel release DOS 2/25/31 with having very thick remaining scar tissue at the medial elbow. Pt just passed 6 weeks post op period and have added in AAROM and AROM activities. Pt reports low level pain and has started to have some right pec and clavicle pain. 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 Job requires increased lifting and reaching. Pt could benefit from continued skilled PT to address ROM, strength, scapular stability as well as thoracic mobility. Patient will continue to benefit from skilled PT services to modify and progress therapeutic interventions, address functional mobility deficits, address ROM deficits, address strength deficits, analyze and address soft tissue restrictions, analyze and cue movement patterns, analyze and modify body mechanics/ergonomics, assess and modify postural abnormalities and instruct in home and community integration to attain remaining goals. []  See Plan of Care  [x]  See progress note/recertification  []  See Discharge Summary         Progress towards goals / Updated goals:  Short Term Goals: STG- To be accomplished in 4 week(s):  1.  Pt will be independent with HEP to encourage prophylaxis. Eval: HEP dispensed   Current: compliance per pt report goal MET 5/5/21      2.  Pt right shoulder flexion PROM will achieve 150deg to demonstrate improved shoulder mobility to be able to progress per protocol. Eval: right shoulder flexion PROM 78deg  Current: progressing 5/12/21 Right Shoulder PROM:  Flexion 140* ( S/L)                Long Term Goals: LTG- To be accomplished in 8 week(s):  1.  Pt will report >/=80% improvement in symptoms to be able to complete ADLs with increased ease. Eval: 4/10 pain at worst by end of day. Unable to put hair up and complete ADLs with ease     2.  Pt bilateral shoulder strength will improve in all planes to 5/5 MMT to allow pt to return to working out. Eval: left UE all planes 5/5, unable to assess right UE due to protocol. Will assess when appropriate  Current: Progressing 5/12/21 not appropriate to assess but have iniaitied AROM and submax isometrics      3. Pt right  strength will be equal to the left to allow pt to lift weights working out with increased ease. Eval: right 63#, left 80#     4.  Pt right shoulder AROM will improve to WFL to allow pt to shower and put hair up with increased ease. Eval:  PROM Right   Flexion 78*   ABD  58*   ER @ 90 Degrees @45* 24*   IR @ 90 Degrees @45*45*      Current: progressing 5/12/21  V     5.  Pt FOTO score will increase by >/= 26 points to show improvement in subjective function.   Eval: 43  Current: Progressing 5/12/21 48    PLAN  [x]  Upgrade activities as tolerated     []  Continue plan of care  []  Update interventions per flow sheet       []  Discharge due to:_  []  Other:_      Kalpana Patel, PT, DPT 5/12/2021  9:44 AM    Future Appointments   Date Time Provider Juliette Ames   5/14/2021 11:00 AM Jackson Mota [facility]   5/19/2021  1:00 PM Jorge Pettit PT, DPT [facility]   5/21/2021 [facility]   5/26/2021  2:30 PM [place] JUAN RichardsHPJORI THE [facility]   5/28/2021 11:45 AM Elizabeth Richards, JUAN MIBRADENTBELVIS [facility]

## 2021-05-12 NOTE — PROGRESS NOTES
[facility] at the [address], [place], [address] [facility]  Phone: [phone]      Fax:  [phone]    Progress Note  Patient name: Lolita Gutierrez Start of Care: 4/15/2021   Referral source: Can Astudillo MD : 1980               Medical Diagnosis: Pain radiating to right shoulder [M25.511]    Onset Date:DOS 3/25/21               Treatment Diagnosis: right shoulder pain   Prior Hospitalization: see medical history Provider#: [id number]   Medications: Verified on Patient summary List    Comorbidities: [employer] repair when 14yo, L4/L5 spinal fusion 10/2017, Right carpal and cubital tunnel release DOS 21, EDS, depression   Prior Level of Function: working out, crossfit, complete ADLs with ease and no pain                            Visits from Start of Care: 5    Missed Visits: 0    Progress Towards Goals:   Short Term Goals: STG- To be accomplished in 4 week(s):  1.  Pt will be independent with HEP to encourage prophylaxis. Eval: HEP dispensed   Current: compliance per pt report goal MET 21      2. Pt right shoulder flexion PROM will achieve 150deg to demonstrate improved shoulder mobility to be able to progress per protocol. Eval: right shoulder flexion PROM 78deg  Current: progressing 21 Right Shoulder PROM:  Flexion 140* ( S/L)                 Long Term Goals: LTG- To be accomplished in 8 week(s):  1.  Pt will report >/=80% improvement in symptoms to be able to complete ADLs with increased ease. Eval: 4/10 pain at worst by end of day. Unable to put hair up and complete ADLs with ease     2.  Pt bilateral shoulder strength will improve in all planes to 5/5 MMT to allow pt to return to working out. Eval: left UE all planes , unable to assess right UE due to protocol. Will assess when appropriate  Current: Progressing 21 not appropriate to assess but have iniaitied AROM and submax isometrics      3.  Pt right  strength will be equal to the left to allow pt to lift weights working out with increased ease. Eval: right 63#, left 80#     4.  Pt right shoulder AROM will improve to WFL to allow pt to shower and put hair up with increased ease. Eval:  PROM Right   Flexion 78*   ABD  58*   ER @ 90 Degrees @45* 24*   IR @ 90 Degrees @45*45*      Current: progressing 5/12/21  V     5.  Pt FOTO score will increase by >/= 26 points to show improvement in subjective function. Eval: 43  Current: Progressing 5/12/21 48      Key Functional Changes:   Right Shoulder PROM:  Flexion 140* ( S/L)  IR @ 45* Abd: 63  ER @ 45* Abd:54     Noted atrophy at right infraspinatus and subscapularis with manual.      Pt has been seen 5 visits including initial evaluation S/P right Shoulder arthroscopic subacromial decompression, distal clavical excision, RCR, and longhead biceps tenodesis DOS 3/25/21. Pt also had right cubital tunnel and carpal tunnel release DOS 2/25/31 with having very thick remaining scar tissue at the medial elbow. Pt just passed 6 weeks post op period and have added in AAROM and AROM activities. Pt reports low level pain and has started to have some right pec and clavicle pain. 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 Job requires increased lifting and reaching. Pt could benefit from continued skilled PT to address ROM, strength, scapular stability as well as thoracic mobility.      Updated Goals: to be achieved in 19 treatments:   Same as above     ASSESSMENT/RECOMMENDATIONS:  [x]Continue therapy per initial plan/protocol at a frequency of  19 treatments  []Continue therapy with the following recommended changes:_____________________      _____________________________________________________________________  []Discontinue therapy progressing towards or have reached established goals  []Discontinue therapy due to lack of appreciable progress towards goals  []Discontinue therapy due to lack of attendance or compliance  []Await Physician's recommendations/decisions regarding therapy  []Other:________________________________________________________________    Thank you for this referral.   Renato Schaefer, PT, DPT 5/12/2021 2:12 PM

## 2021-05-14 ENCOUNTER — HOSPITAL ENCOUNTER (OUTPATIENT)
Dept: PHYSICAL THERAPY | Age: 41
Discharge: HOME | End: 2021-05-14
Payer: COMMERCIAL

## 2021-05-14 PROCEDURE — 97110 THERAPEUTIC EXERCISES: CPT

## 2021-05-14 PROCEDURE — 97140 MANUAL THERAPY 1/> REGIONS: CPT

## 2021-05-14 PROCEDURE — 97112 NEUROMUSCULAR REEDUCATION: CPT

## 2021-05-14 NOTE — PROGRESS NOTES
PT DAILY TREATMENT NOTE    Patient Name: Amelia Hubbard  Date:2021  : 1980  [x]  Patient  Verified  Payor: Milton Tobar / Plan: Yair Weeks RPN / Product Type: Commerical /    In time:11:02  Out time:12:05  Total Treatment Time (min): 63  Total Timed Codes (min): 63  1:1 Treatment Time ( only): 63   Visit #: 6 of 24    Treatment Area: Pain radiating to right shoulder [M25.511]    SUBJECTIVE  Pain Level (0-10 scale): 1  Any medication changes, allergies to medications, adverse drug reactions, diagnosis change, or new procedure performed?: [x] No    [] Yes (see summary sheet for update)  Subjective functional status/changes:   [] No changes reported  \"tight in the front still.'    OBJECTIVE    36 min Therapeutic Exercise:  [] See flow sheet :   Rationale: increase ROM and increase strength to improve the patients ability to perform daily activities with decreased pain and symptom levels    12 min Neuromuscular Re-education:  [x]  See flow sheet :   Rationale: increase strength, improve coordination and increase proprioception  to improve the patients ability to perform daily activities with decreased pain and symptom levels    15 min Manual Therapy:  STM to lats and subscap in s/l and supine, cupping to right lats with active s/l shoulder flexion with table, scap mobs in s/l, PROM all planes right shoulder   Rationale: decrease pain, increase ROM and increase tissue extensibility to improve the patients ability to perform daily activities with decreased pain and symptom levels  The manual therapy interventions were performed at a separate and distinct time from the therapeutic activities interventions.           With   [] TE   [] TA   [] neuro   [] other: Patient Education: [x] Review HEP    [] Progressed/Changed HEP based on:   [] positioning   [] body mechanics   [] transfers   [] heat/ice application    [] other:      Other Objective/Functional Measures:   Decreased thoracic protraction  Left oblique facilitation with standing gentle right reach   Good form in prone  Increased thoracic extension with s/l shoulder flexion      Pain Level (0-10 scale) post treatment: 0    ASSESSMENT/Changes in Function: Pt tolerated session well with no pain post session. Pt with continued decreased thoracic protraction with needing tactile cues to decrease thoracic extension with exercises. Pt appropriately challenged with exercises and will continue to progress per protocol. Patient will continue to benefit from skilled PT services to modify and progress therapeutic interventions, address functional mobility deficits, address ROM deficits, address strength deficits, analyze and address soft tissue restrictions, analyze and cue movement patterns, analyze and modify body mechanics/ergonomics, assess and modify postural abnormalities and instruct in home and community integration to attain remaining goals. []  See Plan of Care  []  See progress note/recertification  []  See Discharge Summary         Progress towards goals / Updated goals:  Short Term Goals: STG- To be accomplished in 4 week(s):  1.  Pt will be independent with HEP to encourage prophylaxis. Eval: HEP dispensed   Last PN: compliance per pt report goal MET 5/5/21      2. Pt right shoulder flexion PROM will achieve 150deg to demonstrate improved shoulder mobility to be able to progress per protocol. Eval: right shoulder flexion PROM 78deg  Last PN: progressing 5/12/21 Right Shoulder PROM:  Flexion 140* ( S/L)  Current:      Long Term Goals: LTG- To be accomplished in 8 week(s):  1.  Pt will report >/=80% improvement in symptoms to be able to complete ADLs with increased ease. Eval: 4/10 pain at worst by end of day. Unable to put hair up and complete ADLs with ease  Current: 1/10 max pain progressing 5/14/21     2.  Pt bilateral shoulder strength will improve in all planes to 5/5 MMT to allow pt to return to working out.   Eval: left UE all planes 5/5, unable to assess right UE due to protocol. Will assess when appropriate  Last PN: Progressing 5/12/21 not appropriate to assess but have iniaitied AROM and submax isometrics   Current:      3. Pt right  strength will be equal to the left to allow pt to lift weights working out with increased ease. Eval: right 63#, left 80#  Current:      4.  Pt right shoulder AROM will improve to WFL to allow pt to shower and put hair up with increased ease. Eval:  PROM Right   Flexion 78*   ABD  58*   ER @ 90 Degrees @45* 24*   IR @ 90 Degrees @45*45*      Last PN: progressing 5/12/21  PROM Right   Flexion 140* s/l   ABD     ER @ 90 Degrees @45* 54*   IR @ 90 Degrees @45* 63*     Current: PROM right shoulder ABD 90* progressing 5/14/21     5.  Pt FOTO score will increase by >/= 26 points to show improvement in subjective function.   Eval: 43  Last PN: 48  Current:          PLAN  []  Upgrade activities as tolerated     [x]  Continue plan of care  []  Update interventions per flow sheet       []  Discharge due to:_  []  Other:_      Precious Marker 5/14/2021  11:22 AM    Future Appointments   Date Time Provider Juliette Ames   5/19/2021  1:00 PM Nancy Henao, PT, DPT [facility]   5/21/2021 11:45 AM Micheleoswaldo Pereyra, PT [facility]   5/26/2021  2:30 PM Micheleoswaldo Pereyra, PT [facility]   5/28/2021 11:45 AM Michele Rush, PT [facility]

## 2021-05-19 ENCOUNTER — HOSPITAL ENCOUNTER (OUTPATIENT)
Dept: PHYSICAL THERAPY | Age: 41
Discharge: HOME | End: 2021-05-19
Payer: COMMERCIAL

## 2021-05-19 PROCEDURE — 97140 MANUAL THERAPY 1/> REGIONS: CPT

## 2021-05-19 PROCEDURE — 97112 NEUROMUSCULAR REEDUCATION: CPT

## 2021-05-19 PROCEDURE — 97110 THERAPEUTIC EXERCISES: CPT

## 2021-05-19 NOTE — PROGRESS NOTES
PT DAILY TREATMENT NOTE    Patient Name: Benjy Hagan  Date:2021  : 1980  [x]  Patient  Verified  Payor: Johanna Monge / Plan: Manju Brown RPN / Product Type: Commerical /    In time:1:09 pm  Out time:2:05 pm   Total Treatment Time (min): 56  Total Timed Codes (min): 56  Visit #: 7 of 24    Treatment Area: Pain radiating to right shoulder [M25.511]    SUBJECTIVE  Pain Level (0-10 scale): 1  Any medication changes, allergies to medications, adverse drug reactions, diagnosis change, or new procedure performed?: [x] No    [] Yes (see summary sheet for update)  Subjective functional status/changes:   [] No changes reported  Pt reported bumping right shoulder on bookcase yesterday \"I felt something shift. It feels a lot better today. But it hurt all day yesterday. \"    OBJECTIVE      26 min Therapeutic Exercise:  [x] See flow sheet :   Rationale: increase ROM, increase strength, improve coordination and increase proprioception to improve the patients ability to perform daily activities with decreased pain and symptom levels       15 min Neuromuscular Re-education:  [x]  See flow sheet :   Rationale: increase ROM, increase strength, improve coordination and increase proprioception  to improve the patients ability to perform daily activities with decreased pain and symptom levels      15 min Manual Therapy:  Scap mobs in left S/L  PROM Scapular PNFs in left S/L  STM to right infraspinatus, lat and subscap in SL  PROM with scap stabilized in left S/L  Gentle rib mobilizations with active inhale/exhale  Right apical expansion    Rationale: decrease pain, increase ROM, increase tissue extensibility and increase postural awareness to improve the patients ability to perform daily activities with decreased pain and symptom levels    The manual therapy interventions were performed at a separate and distinct time from the therapeutic activities interventions.           With   [] TE   [] TA   [] neuro   [] other: Patient Education: [x] Review HEP    [] Progressed/Changed HEP based on:   [] positioning   [] body mechanics   [] transfers   [] heat/ice application    [] other:      Other Objective/Functional Measures:   Noted active trigger points in right infraspinatus and subscapularis  Added S/L ER with glut max, 90-90s and 90-90 pullovers with good tolerance  Per observation improving ER AROM  Lamas mild impingement sx with rep 6 of 90-90 pullovers     Pain Level (0-10 scale) post treatment: 0    ASSESSMENT/Changes in Function:   Was able to tolerate mild progression of exercises. Reported decreased discomfort as compared to when started this session. Initiated S/L ER, 90-90 activities and added weight to prone with no increase in pain. Continues to report increased UT pain and intermittent clavicular pain. Patient will continue to benefit from skilled PT services to modify and progress therapeutic interventions, address functional mobility deficits, address ROM deficits, address strength deficits, analyze and address soft tissue restrictions, analyze and cue movement patterns, analyze and modify body mechanics/ergonomics, assess and modify postural abnormalities and instruct in home and community integration to attain remaining goals. []  See Plan of Care  []  See progress note/recertification  []  See Discharge Summary         Progress towards goals / Updated goals:  Short Term Goals: STG- To be accomplished in 4 week(s):  1.  Pt will be independent with HEP to encourage prophylaxis. Eval: HEP dispensed   Last PN: compliance per pt report goal MET 5/5/21      2. Pt right shoulder flexion PROM will achieve 150deg to demonstrate improved shoulder mobility to be able to progress per protocol.   Eval: right shoulder flexion PROM 78deg  Last PN: progressing 5/12/21 Right Shoulder PROM:  Flexion 140* ( S/L)  Current:      Long Term Goals: LTG- To be accomplished in 8 week(s):  1.  Pt will report >/=80% improvement in symptoms to be able to complete ADLs with increased ease. Eval: 4/10 pain at worst by end of day. Unable to put hair up and complete ADLs with ease  Current: 1/10 max pain progressing 5/14/21     2.  Pt bilateral shoulder strength will improve in all planes to 5/5 MMT to allow pt to return to working out. Eval: left UE all planes 5/5, unable to assess right UE due to protocol. Will assess when appropriate  Last PN: Progressing 5/12/21 not appropriate to assess but have iniaitied AROM and submax isometrics   Current: Progressing 5/18/21 added S/L ER with good tolerance - progressing as per tolerance to exercises.      3. Pt right  strength will be equal to the left to allow pt to lift weights working out with increased ease. Eval: right 63#, left 80#  Current:      4.  Pt right shoulder AROM will improve to WFL to allow pt to shower and put hair up with increased ease. Eval:  PROM Right   Flexion 78*   ABD  58*   ER @ 90 Degrees @45* 24*   IR @ 90 Degrees @45*45*      Last PN: progressing 5/12/21  PROM Right   Flexion 140* s/l   ABD      ER @ 90 Degrees @45* 54*   IR @ 90 Degrees @45* 63*      Current: PROM right shoulder ABD 90* progressing 5/14/21     5.  Pt FOTO score will increase by >/= 26 points to show improvement in subjective function.   Eval: 43  Last PN: 48  Current:       PLAN  [x]  Upgrade activities as tolerated     []  Continue plan of care  []  Update interventions per flow sheet       []  Discharge due to:_  []  Other:_      Kj Bateman PT, DPT 5/19/2021  1:14 PM    Future Appointments   Date Time Provider Juliette Ames   5/21/2021 11:45 AM JUAN Granados [facility]   5/26/2021  2:30 PM JUAN Granados THE [facility]   5/28/2021 11:45 AM JUAN Granados THE [facility]

## 2021-05-21 ENCOUNTER — HOSPITAL ENCOUNTER (OUTPATIENT)
Dept: PHYSICAL THERAPY | Age: 41
Discharge: HOME | End: 2021-05-21
Payer: COMMERCIAL

## 2021-05-21 PROCEDURE — 97140 MANUAL THERAPY 1/> REGIONS: CPT

## 2021-05-21 PROCEDURE — 97110 THERAPEUTIC EXERCISES: CPT

## 2021-05-21 PROCEDURE — 97112 NEUROMUSCULAR REEDUCATION: CPT

## 2021-05-21 NOTE — PROGRESS NOTES
PT DAILY TREATMENT NOTE    Patient Name: Yadira Gannon  Date:2021  : 1980  [x]  Patient  Verified  Payor: Yuri Hatch / Plan: [address] RPN / Product Type: Commerical /    In time: 11:47  Out time:12:39  Total Treatment Time (min):  52  Total Timed Codes (min): 52  1:1 Treatment Time ( [address] only): 49   Visit #: 8 of 24    Treatment Area: Pain radiating to right shoulder [M25.511]    SUBJECTIVE  Pain Level (0-10 scale): 0/10  Any medication changes, allergies to medications, adverse drug reactions, diagnosis change, or new procedure performed?: [x] No    [] Yes (see summary sheet for update)  Subjective functional status/changes:   [] No changes reported  Pt reports she felt good after last therapy session. Reports that she wasn't sore after last therapy session. OBJECTIVE:  Pt enters gym in no apparent distress          27 min Therapeutic Exercise:  [x] See flow sheet :   Rationale: increase ROM and increase strength to improve the patients ability to perform daily activities with decreased pain and symptom levels       15 min Neuromuscular Re-education:  [x]  See flow sheet :   Rationale: improve coordination, improve balance, and increase proprioception  to improve the patients ability to perform daily activities with decreased pain and symptom levels. 10 min Manual Therapy:  Scap mobs in left S/L  PROM Scapular PNFs in left S/L  MFR intramuscular to right infraspinatus, teres major, upper trap, rhomboid, lat and subscap in left SL  PROM sh flex with scap stabilized in left S/L  Pt supine: PROM sh flexion, MFR intramuscular to subscapularis      Rationale: increase tissue extensibility, decrease trigger points, and increase postural awareness to improve the patients ability to perform daily activities with decreased pain and symptom levels. The manual therapy interventions were performed at a separate and distinct time from the therapeutic activities interventions.           With   [x] TE   [] TA   [] neuro   [] other: Patient Education: [x] Review HEP    [] Progressed/Changed HEP based on:   [] positioning   [] body mechanics   [] transfers   [] heat/ice application    [] other:      Other Objective/Functional Measures: sh flexion in supine: 130      Pain Level (0-10 scale) post treatment: 0/10, tightness    ASSESSMENT/Changes in Function: Patient tolerated therapy session well as there were no adverse reactions today. Pt with noted muscle hypertonicity so performed manual therapy. Recommended pt using theracane/massager/golf ball/tennis ball/lacrose ball against a wall to continue to work on trigger points at home. Pt with no pinching this therapy session with performing 90-90 pull overs. Pt is progressing with therapy as indicated by pt tolerating increase in exercise repetitions and resistance. Although showing progress patient would benefit from continuation of skilled physical therapy to address the remaining limitations. Patient will continue to benefit from skilled PT services to  modify and progress therapeutic interventions, address functional mobility deficits, address ROM deficits, address strength deficits, analyze and address soft tissue restrictions, analyze and cue movement patterns, analyze and modify body mechanics/ergonomics, assess and modify postural abnormalities, address imbalance/dizziness and instruct in home and community integration to attain remaining goals. [x]  See Plan of Care  [x]  See progress note/recertification  []  See Discharge Summary         Progress towards goals / Updated goals:  Short Term Goals: STG- To be accomplished in 4 week(s):  1.  Pt will be independent with HEP to encourage prophylaxis. Eval: HEP dispensed   Last PN: compliance per pt report goal MET 5/5/21      2. Pt right shoulder flexion PROM will achieve 150deg to demonstrate improved shoulder mobility to be able to progress per protocol.   Eval: right shoulder flexion PROM 78deg  Last PN: progressing 5/12/21 Right Shoulder PROM:  Flexion 140* ( S/L)  Current: 5/21/21 supine 130 degrees     Long Term Goals: LTG- To be accomplished in 8 week(s):  1.  Pt will report >/=80% improvement in symptoms to be able to complete ADLs with increased ease. Eval: 4/10 pain at worst by end of day. Unable to put hair up and complete ADLs with ease  Current: 1/10 max pain progressing 5/14/21     2.  Pt bilateral shoulder strength will improve in all planes to 5/5 MMT to allow pt to return to working out. Eval: left UE all planes 5/5, unable to assess right UE due to protocol. Will assess when appropriate  Last PN: Progressing 5/12/21 not appropriate to assess but have iniaitied AROM and submax isometrics   Current: Progressing 5/18/21 added S/L ER with good tolerance - progressing as per tolerance to exercises.      3. Pt right  strength will be equal to the left to allow pt to lift weights working out with increased ease. Eval: right 63#, left 80#  Current:      4.  Pt right shoulder AROM will improve to WFL to allow pt to shower and put hair up with increased ease. Eval:  PROM Right   Flexion 78*   ABD  58*   ER @ 90 Degrees @45* 24*   IR @ 90 Degrees @45*45*      Last PN: progressing 5/12/21  PROM Right   Flexion 140* s/l   ABD      ER @ 90 Degrees @45* 54*   IR @ 90 Degrees @45* 63*      Current: PROM right shoulder ABD 90* progressing 5/14/21     5.  Pt FOTO score will increase by >/= 26 points to show improvement in subjective function.   Eval: 43  Last PN: 48  Current:       PLAN  [x]  Upgrade activities as tolerated     [x]  Continue plan of care  [x]  Update interventions per flow sheet       []  Discharge due to:_  []  Other:_      Ameena Rosas, PT, DPT, [facility] 5/21/2021  11:36 AM    Future Appointments   Date Time Provider [place]   5/21/2021 11:45 AM Tye Alpers, PT [facility]   5/26/2021  2:30 PM Tye Alpers, PT [facility]   5/28/2021 11:45 AM Tye Alpers, PT [facility]

## 2021-05-26 ENCOUNTER — HOSPITAL ENCOUNTER (OUTPATIENT)
Dept: PHYSICAL THERAPY | Age: 41
Discharge: HOME | End: 2021-05-26
Payer: COMMERCIAL

## 2021-05-26 PROCEDURE — 97140 MANUAL THERAPY 1/> REGIONS: CPT

## 2021-05-26 PROCEDURE — 97110 THERAPEUTIC EXERCISES: CPT

## 2021-05-26 PROCEDURE — 97112 NEUROMUSCULAR REEDUCATION: CPT

## 2021-05-26 NOTE — PROGRESS NOTES
PT DAILY TREATMENT NOTE    Patient Name: Amelia Hubbard  Date:2021  : 1980  [x]  Patient  Verified  Payor: Milton Tobar / Plan: Yair Weeks RPN / Product Type: Commerical /    In time:2:36  Out time: 2:29  Total Treatment Time (min): 53  Total Timed Codes (min): 53  1:1 Treatment Time ( only): 48   Visit #: 9 of 24    Treatment Area: Pain radiating to right shoulder [M25.511]    SUBJECTIVE  Pain Level (0-10 scale): \"uncomfortable\"  Any medication changes, allergies to medications, adverse drug reactions, diagnosis change, or new procedure performed?: [x] No    [] Yes (see summary sheet for update)  Subjective functional status/changes:   [] No changes reported  Pt reports that she is having some shoulder blade and trap pain. Report she did ride to [place] [employer] this weekend but didn't do anything but walk around. Reports that she tried to foam roll it a little today. OBJECTIVE:  Pt enters gym in no apparent distress      23 min Therapeutic Exercise:  [x] See flow sheet :   Rationale: increase ROM and increase strength to improve the patients ability to perform daily activities with decreased pain and symptom levels       10 min Neuromuscular Re-education:  [x]  See flow sheet :   Rationale: improve coordination, improve balance, and increase proprioception  to improve the patients ability to perform daily activities with decreased pain and symptom levels. 10 min Manual Therapy:  Manual Therapy:  Scap mobs with contract relax in left S/L  PROM Scapular PNFs in left S/L  MFR intramuscular to right infraspinatus, teres major, upper trap, rhomboid, lat and subscap in left SL  PROM sh flex with scap stabilized in left S/L   Rationale: increase tissue extensibility, decrease trigger points, and increase postural awareness to improve the patients ability to perform daily activities with decreased pain and symptom levels.   The manual therapy interventions were performed at a separate and distinct time from the therapeutic activities interventions. With   [x] TE   [] TA   [] neuro   [] other: Patient Education: [x] Review HEP    [] Progressed/Changed HEP based on:   [] positioning   [] body mechanics   [] transfers   [] heat/ice application    [] other:      Other Objective/Functional Measures: see below     Pain Level (0-10 scale) post treatment: 0/10    ASSESSMENT/Changes in Function: Patient tolerated therapy session well as there were no adverse reactions today. Pt with muscle hypertonicity so performed manual techniques and pt felt better. Pt feeling fatigued with inc in reps this therapy session. Added standing active flex and scaption to POC and pt did have some noted slight shoulder hiking and upper trap activation. Pt is progressing with therapy as indicated by pt tolerating increase in exercise repetitions and resistance. Although showing progress patient would benefit from continuation of skilled physical therapy to address the remaining limitations. Patient will continue to benefit from skilled PT services to  modify and progress therapeutic interventions, address functional mobility deficits, address ROM deficits, address strength deficits, analyze and address soft tissue restrictions, analyze and cue movement patterns, analyze and modify body mechanics/ergonomics and assess and modify postural abnormalities to attain remaining goals. [x]  See Plan of Care  [x]  See progress note/recertification  []  See Discharge Summary         Progress towards goals / Updated goals:  Short Term Goals: STG- To be accomplished in 4 week(s):  1.  Pt will be independent with HEP to encourage prophylaxis. Eval: HEP dispensed   Last PN: compliance per pt report goal MET 5/5/21      2. Pt right shoulder flexion PROM will achieve 150deg to demonstrate improved shoulder mobility to be able to progress per protocol.   Eval: right shoulder flexion PROM 78deg  Last PN: progressing 5/12/21 Right Shoulder PROM:  Flexion 140* ( S/L)  Current: 5/21/21 supine 130 degrees     Long Term Goals: LTG- To be accomplished in 8 week(s):  1.  Pt will report >/=80% improvement in symptoms to be able to complete ADLs with increased ease. Eval: 4/10 pain at worst by end of day. Unable to put hair up and complete ADLs with ease  Current: no pain progressing 5/26/21     2.  Pt bilateral shoulder strength will improve in all planes to 5/5 MMT to allow pt to return to working out. Eval: left UE all planes 5/5, unable to assess right UE due to protocol. Will assess when appropriate  Last PN: Progressing 5/12/21 not appropriate to assess but have iniaitied AROM and submax isometrics   Current: Progressing 5/26/21 added S/L ER and s/l sh abd with good tolerance, performed standing sh flex and scaption and pt had some shoulder hiking - progressing as per tolerance to exercises.      3. Pt right  strength will be equal to the left to allow pt to lift weights working out with increased ease. Eval: right 63#, left 80#  Current:      4.  Pt right shoulder AROM will improve to WFL to allow pt to shower and put hair up with increased ease. Eval:  PROM Right   Flexion 78*   ABD  58*   ER @ 90 Degrees @45* 24*   IR @ 90 Degrees @45*45*      Last PN: progressing 5/12/21  PROM Right   Flexion 140* s/l   ABD      ER @ 90 Degrees @45* 54*   IR @ 90 Degrees @45* 63*      Current: PROM right shoulder ABD 90* progressing 5/14/21     5.  Pt FOTO score will increase by >/= 26 points to show improvement in subjective function.   Eval: 43  Last PN: 48  Current:       PLAN  [x]  Upgrade activities as tolerated     [x]  Continue plan of care  [x]  Update interventions per flow sheet       []  Discharge due to:_  []  Other:_      Wilfred Cedeño, PT, DPT, [facility] 5/26/2021  2:44 PM    Future Appointments   Date Time Provider [place]   5/28/2021 11:45 AM Bel Aldridge, PT [facility]

## 2021-05-28 ENCOUNTER — HOSPITAL ENCOUNTER (OUTPATIENT)
Dept: PHYSICAL THERAPY | Age: 41
Discharge: HOME | End: 2021-05-28
Payer: COMMERCIAL

## 2021-05-28 PROCEDURE — 97110 THERAPEUTIC EXERCISES: CPT

## 2021-05-28 PROCEDURE — 97140 MANUAL THERAPY 1/> REGIONS: CPT

## 2021-05-28 PROCEDURE — 97112 NEUROMUSCULAR REEDUCATION: CPT

## 2021-05-28 NOTE — PROGRESS NOTES
PT DAILY TREATMENT NOTE    Patient Name: Radha Murguia  Date:2021  : 1980  [x]  Patient  Verified  Payor: Aarti Varela / Plan: Melina Cassidy RPN / Product Type: Commerical /    In time: 11:46  Out time: 12:43  Total Treatment Time (min): 57  Total Timed Codes (min): 57  1:1 Treatment Time ( only): 57   Visit #: 10 of 24    Treatment Area: Pain radiating to right shoulder [M25.511]    SUBJECTIVE  Pain Level (0-10 scale): \"just tightness in the front\"  Any medication changes, allergies to medications, adverse drug reactions, diagnosis change, or new procedure performed?: [x] No    [] Yes (see summary sheet for update)  Subjective functional status/changes:   [] No changes reported  Reports that she feels tight in the front of the shoulder. Reports that she felt fine after last therapy session. Reports that she still does her hair upside down and washes her hair upside down. Reports that she fell yesterday where her foot got stuck in the doggy door and landed on her left side. Reports no injury to the right. OBJECTIVE:  Pt enters gym in no apparent distress        25 min Therapeutic Exercise:  [x]? See flow sheet :   Rationale: increase ROM and increase strength to improve the patients ability to perform daily activities with decreased pain and symptom levels        17 min Neuromuscular Re-education:  [x]? See flow sheet :   Rationale: improve coordination, improve balance, and increase proprioception  to improve the patients ability to perform daily activities with decreased pain and symptom levels.      15 min Manual Therapy:  Manual Therapy:  Scap mobs with contract relax in left S/L  PROM Scapular PNFs in left S/L  MFR intramuscular to right infraspinatus, teres major, upper trap, rhomboid, lower trap, pect minor, lat and subscap in left SL  PROM sh flex with scap stabilized in left S/L   Rationale: increase tissue extensibility, decrease trigger points, and increase postural awareness to improve the patients ability to perform daily activities with decreased pain and symptom levels. The manual therapy interventions were performed at a separate and distinct time from the therapeutic activities interventions.                                                                    With   [x]? TE   []? TA   []? neuro   []? other: Patient Education: [x]? Review HEP    []? Progressed/Changed HEP based on:   []? positioning   []? body mechanics   []? transfers   []? heat/ice application    [x]? other: attempt to wash hair and do hair standing      Other Objective/Functional Measures: see below   Active sh flexion: supine 142 degrees/after PPT exercises 150   strength:  right 75#, left 80#            Pain Level (0-10 scale) post treatment: 0/10     ASSESSMENT/Changes in Function: Patient tolerated therapy session well as there were no adverse reactions today. Pt with muscle hypertonicity so performed manual techniques and pt felt better. PROM sh flexion in sidelying is close to full range. Added standing active flex and scaption to POC and therapist provided scapular assistance which helped dec shoulder hiking. Pt does have impaired scapulohumeral rhythm with sh elevation. Pt is progressing with therapy as indicated by pt tolerating increase in exercise repetitions and resistance. Although showing progress patient would benefit from continuation of skilled physical therapy to address the remaining limitations.      Patient will continue to benefit from skilled PT services to  modify and progress therapeutic interventions, address functional mobility deficits, address ROM deficits, address strength deficits, analyze and address soft tissue restrictions, analyze and cue movement patterns, analyze and modify body mechanics/ergonomics and assess and modify postural abnormalities to attain remaining goals. [x]? See Plan of Care  [x]? See progress note/recertification  []?   See Discharge Summary Progress towards goals / Updated goals:  Short Term Goals: STG- To be accomplished in 4 week(s):  1.  Pt will be independent with HEP to encourage prophylaxis. Eval: HEP dispensed   Last PN: compliance per pt report goal MET 5/5/21      2. Pt right shoulder flexion PROM will achieve 150deg to demonstrate improved shoulder mobility to be able to progress per protocol. Eval: right shoulder flexion PROM 78deg  Last PN: progressing 5/12/21 Right Shoulder PROM:  Flexion 140* ( S/L)  Current: 5/28/21 progressing Active sh flexion: supine 142 degrees/after PPT exercises 150                Long Term Goals: LTG- To be accomplished in 8 week(s):  1.  Pt will report >/=80% improvement in symptoms to be able to complete ADLs with increased ease. Eval: 4/10 pain at worst by end of day. Unable to put hair up and complete ADLs with ease  Current: no pain, but still limited in ADLs per reports progressing 5/28/21     2.  Pt bilateral shoulder strength will improve in all planes to 5/5 MMT to allow pt to return to working out. Eval: left UE all planes 5/5, unable to assess right UE due to protocol. Will assess when appropriate  Last PN: Progressing 5/12/21 not appropriate to assess but have iniaitied AROM and submax isometrics   Current: Progressing 5/26/21 added S/L ER and s/l sh abd with good tolerance, performed standing sh flex and scaption and pt had some shoulder hiking - progressing as per tolerance to exercises.      3. Pt right  strength will be equal to the left to allow pt to lift weights working out with increased ease. Eval: right 63#, left 80#  Current: 5/28/21  strength:  right 75#, left 80#     4.  Pt right shoulder AROM will improve to WFL to allow pt to shower and put hair up with increased ease.   Eval:  PROM Right   Flexion 78*   ABD  58*   ER @ 90 Degrees @45* 24*   IR @ 90 Degrees @45*45*      Last PN: progressing 5/12/21  PROM Right   Flexion 140* s/l   ABD      ER @ 90 Degrees @45* 54*   IR @ 90 Degrees @45* 63*      Current:  progressing 5/28/21 Active sh flexion: supine 142 degrees/after PPT exercises 150 degrees     5.  Pt FOTO score will increase by >/= 26 points to show improvement in subjective function.   Eval: 43  Last PN: 48  Current: 5/28/21: 56 progressing          PLAN  [x]  Upgrade activities as tolerated     [x]  Continue plan of care  [x]  Update interventions per flow sheet       []  Discharge due to:_  []  Other:_      Krys Gallegos PT, DPT, [facility]IM[facility] 5/28/2021  9:26 AM    Future Appointments   Date Time Provider [place] Ames   5/28/2021 11:45 AM Michael Alvarado, PT [facility]

## 2021-06-07 ENCOUNTER — HOSPITAL ENCOUNTER (OUTPATIENT)
Dept: PHYSICAL THERAPY | Age: 41
Discharge: HOME | End: 2021-06-07
Payer: COMMERCIAL

## 2021-06-07 PROCEDURE — 97112 NEUROMUSCULAR REEDUCATION: CPT

## 2021-06-07 PROCEDURE — 97110 THERAPEUTIC EXERCISES: CPT

## 2021-06-07 PROCEDURE — 97140 MANUAL THERAPY 1/> REGIONS: CPT

## 2021-06-07 NOTE — PROGRESS NOTES
[facility] at the [address], [place], [address] [facility]  Phone: [phone]      Fax:  [phone]    Progress Note  Patient name: Tamie Nguyễn Start of Care: 4/15/2021   Referral Juanita Cabrera MD : 1980               Medical Diagnosis: Pain radiating to right shoulder [M25.511]    Onset Date:DOS 3/25/21               Treatment Diagnosis: right shoulder pain   Prior Hospitalization: see medical history Provider#: [id number]   Medications: Verified on Patient summary List    Comorbidities: [employer] repair when 12yo, L4/L5 spinal fusion 10/2017, Right carpal and cubital tunnel release DOS 21, EDS, depression   Prior Level of Function: working out, crossfit, complete ADLs with ease and no pain                              Visits from Start of Care: 11    Missed Visits: 0    Progress Towards Goals:   Short Term Goals: STG- To be accomplished in 4 week(s):  1.  Pt will be independent with HEP to encourage prophylaxis. Eval: HEP dispensed   Last PN: compliance per pt report goal MET 21      2. Pt right shoulder flexion PROM will achieve 150deg to demonstrate improved shoulder mobility to be able to progress per protocol. Eval: right shoulder flexion PROM 78deg  Last PN: progressing 21 Right Shoulder PROM:  Flexion 140* ( S/L)  Current: 21 progressing Active sh flexion: supine 143 degrees/after PPT exercises 150                 Long Term Goals: LTG- To be accomplished in 8 week(s):  1.  Pt will report >/=80% improvement in symptoms to be able to complete ADLs with increased ease. Eval: 4/10 pain at worst by end of day. Unable to put hair up and complete ADLs with ease  Current: no pain, but still limited in ADLs per reports progressing 21     2.  Pt bilateral shoulder strength will improve in all planes to 5/5 MMT to allow pt to return to working out.   Eval: left UE all planes 5/, unable to assess right UE due to protocol. Will assess when appropriate  Last PN: Progressing 5/12/21 not appropriate to assess but have iniaitied AROM and submax isometrics   Current: Progressing 6/7/21 progressing as per tolerance to exercises.      3. Pt right  strength will be equal to the left to allow pt to lift weights working out with increased ease. Eval: right 63#, left 80#  Current: 5/28/21  strength:  right 75#, left 80#     4.  Pt right shoulder AROM will improve to WFL to allow pt to shower and put hair up with increased ease. Eval:  PROM Right   Flexion 78*   ABD  58*   ER @ 90 Degrees @45* 24*   IR @ 90 Degrees @45*45*      Last PN: progressing 5/12/21  PROM Right   Flexion 140* s/l   ABD      ER @ 90 Degrees @45* 54*   IR @ 90 Degrees @45* 63*      Current:  progressing 6/7/21 Active sh flexion: supine 143 degrees/after PPT exercises 150 degrees  IR @ 45*: 70*  ER at 45*: 64     5.  Pt FOTO score will increase by >/= 26 points to show improvement in subjective function. Eval: 43  Last PN: 48  Current: 5/28/21: 56 progressing      Key Functional Changes:   Pt has been seen 11 visits including initial evaluation S/P right Shoulder arthroscopic subacromial decompression, distal clavical excision, RCR, and longhead biceps tenodesis DOS 3/25/21. Pt also had right cubital tunnel and carpal tunnel release DOS 2/25/31 with having very thick remaining scar tissue at the medial elbow. Overall pt is progressing nicely. Have been paying special attention to scapular stability and position as pt has EDS and increased laxity. Noted pronounced formation along right coracoid process. Reports this session having continued right pec and clavicular discomfort. Also continued increased tension in UT and Levator scap. Has responded well to rib mobilizations, serratus and oblique facilitation. Pt reports low level pain and has started to have some right pec and clavicle pain. 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  Job requires increased lifting and reaching.  Pt could benefit from continued skilled PT to address ROM, strength, scapular stability as well as thoracic mobility.     Updated Goals: to be achieved in 13 treatments:   Same as above    ASSESSMENT/RECOMMENDATIONS:  [x]Continue therapy per initial plan/protocol at a frequency of  13 treatments   []Continue therapy with the following recommended changes:_____________________      _____________________________________________________________________  []Discontinue therapy progressing towards or have reached established goals  []Discontinue therapy due to lack of appreciable progress towards goals  []Discontinue therapy due to lack of attendance or compliance  []Await Physician's recommendations/decisions regarding therapy  []Other:________________________________________________________________    Thank you for this referral.   Shima Juárez, PT, DPT 6/7/2021 5:29 PM

## 2021-06-07 NOTE — PROGRESS NOTES
PT DAILY TREATMENT NOTE    Patient Name: Jazmine Felipe  Date:2021  : 1980  [x]  Patient  Verified  Payor: Kayode Rivers / Plan: Idania Sanders RPN / Product Type: Commerical /    In time:1:45 pm  Out time:2:35 pm   Total Treatment Time (min): 50  Total Timed Codes (min): 50  Visit #: 11 of 24    Treatment Area: Pain radiating to right shoulder [M25.511]    SUBJECTIVE  Pain Level (0-10 scale): 3-4  Any medication changes, allergies to medications, adverse drug reactions, diagnosis change, or new procedure performed?: [x] No    [] Yes (see summary sheet for update)  Subjective functional status/changes:   [] No changes reported  \"Actually hurting a little. \"  Reported pain at right LS and superior angle of scap, \"funny\" feeling at anterior lateral proximal shoulder and pain across right pec distal to clavicle. Pt reported just finishing Z-pack and being sick last week.      OBJECTIVE      10 min Therapeutic Exercise:  [x] See flow sheet :   Rationale: increase ROM, increase strength, improve coordination and increase proprioception to improve the patients ability to perform daily activities with decreased pain and symptom levels       15 min Neuromuscular Re-education:  [x]  See flow sheet :   Rationale: increase ROM, increase strength, improve coordination and increase proprioception  to improve the patients ability to perform daily activities with decreased pain and symptom levels      25 min Manual Therapy:  MILAD AIC correction, Sternal mobilization with active pelvic tilt, Superior T4 (MILAD technique), R MILAD infraclavicular rib pump with active breath   Scap mobs in left S/L  PROM Scapular PNFs in left S/L  STM to right infraspinatus, lat and subscap in SL  PROM with scap stabilized in left S/L  Obtained consent for hand placement      Rationale: decrease pain, increase ROM, increase tissue extensibility and increase postural awareness to improve the patients ability to perform daily activities with decreased pain and symptom levels    The manual therapy interventions were performed at a separate and distinct time from the therapeutic activities interventions. With   [] TE   [] TA   [] neuro   [] other: Patient Education: [x] Review HEP    [] Progressed/Changed HEP based on:   [] positioning   [] body mechanics   [] transfers   [] heat/ice application    [] other:      Other Objective/Functional Measures:   MILAD Special Tests (pre/post)  HGIR:                                                 Right: (at 60* abd) 50/70             Left: 72/90  Shoulder Flexion   Right: 143             Left: NT  Hip Add Drop Test:                           Right: +/-            Left:+ /midline  Trunk Rot                                           Right: 15 in/14 in Left 15 in /13.5 in        Pain Level (0-10 scale) post treatment: 0    ASSESSMENT/Changes in Function:   Pt has been seen 11 visits including initial evaluation S/P right Shoulder arthroscopic subacromial decompression, distal clavical excision, RCR, and longhead biceps tenodesis DOS 3/25/21. Pt also had right cubital tunnel and carpal tunnel release DOS 2/25/31 with having very thick remaining scar tissue at the medial elbow. Overall pt is progressing nicely. Have been paying special attention to scapular stability and position as pt has EDS and increased laxity. Noted pronounced formation along right coracoid process. Reports this session having continued right pec and clavicular discomfort. Also continued increased tension in UT and Levator scap. Has responded well to rib mobilizations, serratus and oblique facilitation. Pt reports low level pain and has started to have some right pec and clavicle pain. 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 Job requires increased lifting and reaching.  Pt could benefit from continued skilled PT to address ROM, strength, scapular stability as well as thoracic mobility.        Patient will continue to benefit from skilled PT services to modify and progress therapeutic interventions, address functional mobility deficits, address ROM deficits, address strength deficits, analyze and address soft tissue restrictions, analyze and cue movement patterns, analyze and modify body mechanics/ergonomics, assess and modify postural abnormalities and instruct in home and community integration to attain remaining goals. []  See Plan of Care  []  See progress note/recertification  []  See Discharge Summary         Progress towards goals / Updated goals:  Short Term Goals: STG- To be accomplished in 4 week(s):  1.  Pt will be independent with HEP to encourage prophylaxis. Eval: HEP dispensed   Last PN: compliance per pt report goal MET 5/5/21      2. Pt right shoulder flexion PROM will achieve 150deg to demonstrate improved shoulder mobility to be able to progress per protocol. Eval: right shoulder flexion PROM 78deg  Last PN: progressing 5/12/21 Right Shoulder PROM:  Flexion 140* ( S/L)  Current: 6/7/21 progressing Active sh flexion: supine 143 degrees/after PPT exercises 150                 Long Term Goals: LTG- To be accomplished in 8 week(s):  1.  Pt will report >/=80% improvement in symptoms to be able to complete ADLs with increased ease. Eval: 4/10 pain at worst by end of day. Unable to put hair up and complete ADLs with ease  Current: no pain, but still limited in ADLs per reports progressing 5/28/21     2.  Pt bilateral shoulder strength will improve in all planes to 5/5 MMT to allow pt to return to working out. Eval: left UE all planes 5/5, unable to assess right UE due to protocol. Will assess when appropriate  Last PN: Progressing 5/12/21 not appropriate to assess but have iniaitied AROM and submax isometrics   Current: Progressing 6/7/21 progressing as per tolerance to exercises.      3. Pt right  strength will be equal to the left to allow pt to lift weights working out with increased ease.   Eval: right 63#, left 80#  Current: 5/28/21  strength:  right 75#, left 80#     4.  Pt right shoulder AROM will improve to WFL to allow pt to shower and put hair up with increased ease. Eval:  PROM Right   Flexion 78*   ABD  58*   ER @ 90 Degrees @45* 24*   IR @ 90 Degrees @45*45*      Last PN: progressing 5/12/21  PROM Right   Flexion 140* s/l   ABD      ER @ 90 Degrees @45* 54*   IR @ 90 Degrees @45* 63*      Current:  progressing 6/7/21 Active sh flexion: supine 143 degrees/after PPT exercises 150 degrees  IR @ 45*: 70*  ER at 45*: 64     5.  Pt FOTO score will increase by >/= 26 points to show improvement in subjective function.   Eval: 43  Last PN: 48  Current: 5/28/21: 56 progressing      PLAN  [x]  Upgrade activities as tolerated     []  Continue plan of care  []  Update interventions per flow sheet       []  Discharge due to:_  []  Other:_      Maida Tejada PT, DPT 6/7/2021  1:53 PM    Future Appointments   Date Time Provider Juliette Ames   6/9/2021 11:00 AM Alicia Mota [facility]   6/14/2021  2:30 PM Mary Pierson PT, DPT [facility]   6/16/2021  1:00 PM Mary Pierson PT, DPT [facility]   6/21/2021  2:30 PM Mary Pierson PT, DPT [facility]   6/23/2021  1:00 PM Alicia Mota [facility]

## 2021-06-09 ENCOUNTER — HOSPITAL ENCOUNTER (OUTPATIENT)
Dept: PHYSICAL THERAPY | Age: 41
Discharge: HOME | End: 2021-06-09
Payer: COMMERCIAL

## 2021-06-09 PROCEDURE — 97110 THERAPEUTIC EXERCISES: CPT

## 2021-06-09 PROCEDURE — 97112 NEUROMUSCULAR REEDUCATION: CPT

## 2021-06-09 PROCEDURE — 97140 MANUAL THERAPY 1/> REGIONS: CPT

## 2021-06-14 ENCOUNTER — HOSPITAL ENCOUNTER (OUTPATIENT)
Dept: PHYSICAL THERAPY | Age: 41
Discharge: HOME | End: 2021-06-14
Payer: COMMERCIAL

## 2021-06-14 PROCEDURE — 97112 NEUROMUSCULAR REEDUCATION: CPT

## 2021-06-14 PROCEDURE — 97110 THERAPEUTIC EXERCISES: CPT

## 2021-06-14 PROCEDURE — 97140 MANUAL THERAPY 1/> REGIONS: CPT

## 2021-06-14 NOTE — PROGRESS NOTES
PT DAILY TREATMENT NOTE    Patient Name: Yadira Gannon  Date:2021  : 1980  [x]  Patient  Verified  Payor: Yuri Hatch / Plan: Cleotha Denver RPN / Product Type: Commerical /    In time:2:34 pm  Out time:3:35 pm  Total Treatment Time (min): 61  Total Timed Codes (min): 61  Visit #: 13 of 24    Treatment Area: Pain radiating to right shoulder [M25.511]    SUBJECTIVE  Pain Level (0-10 scale): 1-2  Any medication changes, allergies to medications, adverse drug reactions, diagnosis change, or new procedure performed?: [x] No    [] Yes (see summary sheet for update)  Subjective functional status/changes:   [] No changes reported  \"It is just tender. I tried to cup myself. \"  Reported tender along anterior shoulder at distal clavicle  Too tender at times to wear bra, strap can irritate it. OBJECTIVE      30 min Therapeutic Exercise:  [x] See flow sheet :   Rationale: increase ROM, increase strength, improve coordination and increase proprioception to improve the patients ability to perform daily activities with decreased pain and symptom levels       16 min Neuromuscular Re-education:  [x]  See flow sheet : included manually resisted PNFs D2 Flex/ext   Rhythmic stabilizations, resistance proximal to elbow.     Rationale: increase ROM, increase strength, improve coordination and increase proprioception  to improve the patients ability to perform daily activities with decreased pain and symptom levels      15 min Manual Therapy:  MILAD AIC correction, Sternal mobilization with active pelvic tilt, Superior T4 (MILAD technique) MILAD infraclavicular rib pump with active breath   Scap mobs in left S/L  PROM Scapular PNFs in left S/L  STM to right infraspinatus, lat and subscap in SL  PROM with scap stabilized in left S/L  Obtained consent for hand placement      Rationale: decrease pain, increase ROM, increase tissue extensibility, decrease trigger points and increase postural awareness to improve the patients ability to perform daily activities with decreased pain and symptom levels    The manual therapy interventions were performed at a separate and distinct time from the therapeutic activities interventions. With   [] TE   [] TA   [] neuro   [] other: Patient Education: [x] Review HEP    [] Progressed/Changed HEP based on:   [] positioning   [] body mechanics   [] transfers   [] heat/ice application    [] other:      Other Objective/Functional Measures:   MILAD Special Tests (pre/post)  HGIR:                                                 Right: 64/75 (scaular plane)             Left: 75/90  Shoulder Flexion   Right: NT/139             Left: NT/145  Hip Add Drop Test:                           Right: +/-            Left:+/midline  Trunk Rot                                           Right: 15 in  Left 15 in    Shoulder Horizontal Abduction          Right: 40              Left: 30        Pain Level (0-10 scale) post treatment: 0    ASSESSMENT/Changes in Function:   Updated HEP this session. Added T8 extension with good tolerance. Active trigger points in infraspinatus. Discussed decreasing to 1x per week to preserve visits pt in agreement. Continued stiffness at right pec minor and subclavius. Very challenged with rhythmic stabilizations resistance proximal to elbow. Patient will continue to benefit from skilled PT services to modify and progress therapeutic interventions, address functional mobility deficits, address ROM deficits, address strength deficits, analyze and address soft tissue restrictions, analyze and cue movement patterns, analyze and modify body mechanics/ergonomics, assess and modify postural abnormalities and instruct in home and community integration to attain remaining goals.      []  See Plan of Care  []  See progress note/recertification  []  See Discharge Summary         Progress towards goals / Updated goals:  Short Term Goals: STG- To be accomplished in 4 week(s):  1.  Pt will be independent with HEP to encourage prophylaxis. Eval: HEP dispensed   Last PN: compliance per pt report goal MET 5/5/21      2. Pt right shoulder flexion PROM will achieve 150deg to demonstrate improved shoulder mobility to be able to progress per protocol. Eval: right shoulder flexion PROM 78deg  Last PN:  6/7/21 progressing Active sh flexion: supine 143 degrees/after PPT exercises 150  Current: 155* AROM goal MET 6/9/21          Long Term Goals: LTG- To be accomplished in 8 week(s):  1.  Pt will report >/=80% improvement in symptoms to be able to complete ADLs with increased ease. Eval: 4/10 pain at worst by end of day. Unable to put hair up and complete ADLs with ease  Last PN: no pain, but still limited in ADLs per reports   Current:      2.  Pt bilateral shoulder strength will improve in all planes to 5/5 MMT to allow pt to return to working out. Eval: left UE all planes 5/5, unable to assess right UE due to protocol. Will assess when appropriate  Last PN:: Progressing 6/7/21 progressing as per tolerance to exercises. Current: progressing 6/9/21    L (1-5) R (1-5)   Flexors 5  3   Abductors 5  2-   External Rotators 5  4   Internal Rotators 5  3+   Serratus 4+   3+   Rhomboids  5  4   Lower Trapezius  5  3+   Elbow Flexion 5  5   Elbow Extension 5  4      3. Pt right  strength will be equal to the left to allow pt to lift weights working out with increased ease. Eval: right 63#, left 80#  Last PN: 5/28/21  strength:  right 75#, left 80#  Current:      4.  Pt right shoulder AROM will improve to WFL to allow pt to shower and put hair up with increased ease.   Eval:  PROM Right   Flexion 78*   ABD  58*   ER @ 90 Degrees @45* 24*   IR @ 90 Degrees @45*45*      Last PN:   progressing 6/7/21 Active sh flexion: supine 143 degrees/after PPT exercises 150 degrees  IR @ 45*: 70*  ER at 45*: 64  Current:  flexion 155*, abduction 95*, ER 58* @90*, IR 15* @90*progressing 6/9/21  AROM Flexion: 139* no compensation at lumbar spine - 6/14/21        5.  Pt FOTO score will increase by >/= 26 points to show improvement in subjective function.   Eval: 43  Last PN: 56  Current:reassess at visit 15      PLAN  []  Upgrade activities as tolerated     []  Continue plan of care  []  Update interventions per flow sheet       []  Discharge due to:_  []  Other:_      Kalpana Patel, PT, DPT 6/14/2021  2:34 PM    Future Appointments   Date Time Provider Juliette Ames   6/16/2021  1:00 PM Jorge Pettit, PT, DPT [facility]   6/21/2021  2:30 PM Jorge Pettit, PT, DPT [facility]   6/23/2021  1:00 PM Jackson [facility]OR[facility]

## 2021-06-16 ENCOUNTER — APPOINTMENT (OUTPATIENT)
Dept: PHYSICAL THERAPY | Age: 41
End: 2021-06-16
Payer: COMMERCIAL

## 2021-06-21 ENCOUNTER — HOSPITAL ENCOUNTER (OUTPATIENT)
Dept: PHYSICAL THERAPY | Age: 41
Discharge: HOME | End: 2021-06-21
Payer: COMMERCIAL

## 2021-06-21 PROCEDURE — 97110 THERAPEUTIC EXERCISES: CPT

## 2021-06-21 PROCEDURE — 97140 MANUAL THERAPY 1/> REGIONS: CPT

## 2021-06-21 PROCEDURE — 97112 NEUROMUSCULAR REEDUCATION: CPT

## 2021-06-21 NOTE — PROGRESS NOTES
PT DAILY TREATMENT NOTE    Patient Name: Paul Pritchard  Date:2021  : 1980  [x]  Patient  Verified  Payor: Melina Calvo / Plan: Andrade Marinelli RPN / Product Type: Commerical /    In time:2:36 pm  Out time:3:26 pm   Total Treatment Time (min): 50  Total Timed Codes (min): 50  Visit #: 14 of 24    Treatment Area: Pain radiating to right shoulder [M25.511]    SUBJECTIVE  Pain Level (0-10 scale): 2  Any medication changes, allergies to medications, adverse drug reactions, diagnosis change, or new procedure performed?: [x] No    [] Yes (see summary sheet for update)  Subjective functional status/changes:   [] No changes reported  \"a litle sore. We traveled to [place] really quick the weekend and travel always jacks up my shoulder. \"    OBJECTIVE    20 min Therapeutic Exercise:  [x] See flow sheet :   Rationale: increase ROM, increase strength, improve coordination and increase proprioception to improve the patients ability to perform daily activities with decreased pain and symptom levels       18 min Neuromuscular Re-education:  [x]  See flow sheet :AAROM Scapular PNFs in left S/L  AARM D2 Flex/ext   Rationale: increase ROM, increase strength, improve coordination and increase proprioception  to improve the patients ability to perform daily activities with decreased pain and symptom levels      12 min Manual Therapy:   Sternal mobilization with active pelvic tilt, Superior T4 (MILAD technique) MILAD infraclavicular rib pump with active breath   Scap mobs in left S/L  STM to right infraspinatus, lat and subscap in SL  PROM with scap stabilized in left S/L  Obtained consent for hand placement    Rationale: decrease pain, increase ROM, increase tissue extensibility, decrease trigger points and increase postural awareness to improve the patients ability to perform daily activities with decreased pain and symptom levels    The manual therapy interventions were performed at a separate and distinct time from the therapeutic activities interventions. With   [] TE   [] TA   [] neuro   [] other: Patient Education: [x] Review HEP    [] Progressed/Changed HEP based on:   [] positioning   [] body mechanics   [] transfers   [] heat/ice application    [] other:      Other Objective/Functional Measures:   Right Shoulder AAROM ABD: 112*  IR in 75* of ABD: 75*    Pain Level (0-10 scale) post treatment: 0    ASSESSMENT/Changes in Function:   Attempted lower trap depressions on lat machine - pt had small clunkl at right shoulder, not painful but clunk. Deferred activity. Added plank position activities with good tolerance, required cues to decrease UT activation. Overall tolerated activity well. Patient will continue to benefit from skilled PT services to modify and progress therapeutic interventions, address functional mobility deficits, address ROM deficits, address strength deficits, analyze and address soft tissue restrictions, analyze and cue movement patterns, analyze and modify body mechanics/ergonomics, assess and modify postural abnormalities and instruct in home and community integration to attain remaining goals. []  See Plan of Care  []  See progress note/recertification  []  See Discharge Summary         Progress towards goals / Updated goals:  Short Term Goals: STG- To be accomplished in 4 week(s):  1.  Pt will be independent with HEP to encourage prophylaxis. Eval: HEP dispensed   Last PN: compliance per pt report goal MET 5/5/21      2. Pt right shoulder flexion PROM will achieve 150deg to demonstrate improved shoulder mobility to be able to progress per protocol. Eval: right shoulder flexion PROM 78deg  Last PN:  6/7/21 progressing Active sh flexion: supine 143 degrees/after PPT exercises 150  Current: 155* AROM goal MET 6/9/21          Long Term Goals: LTG- To be accomplished in 8 week(s):  1.  Pt will report >/=80% improvement in symptoms to be able to complete ADLs with increased ease.    Eval: 4/10 pain at worst by end of day. Unable to put hair up and complete ADLs with ease  Last PN: no pain, but still limited in ADLs per reports   Current:      2.  Pt bilateral shoulder strength will improve in all planes to 5/5 MMT to allow pt to return to working out. Eval: left UE all planes 5/5, unable to assess right UE due to protocol. Will assess when appropriate  Last PN:: Progressing 6/7/21 progressing as per tolerance to exercises.   Current: progressing 6/9/21    L (1-5) R (1-5)   Flexors 5  3   Abductors 5  2-   External Rotators 5  4   Internal Rotators 5  3+   Serratus 4+   3+   Rhomboids  5  4   Lower Trapezius  5  3+   Elbow Flexion 5  5   Elbow Extension 5  4      3. Pt right  strength will be equal to the left to allow pt to lift weights working out with increased ease. Eval: right 63#, left 80#  Last PN: 5/28/21  strength:  right 75#, left 80#  Current:      4.  Pt right shoulder AROM will improve to WFL to allow pt to shower and put hair up with increased ease. Eval:  PROM Right   Flexion 78*   ABD  58*   ER @ 90 Degrees @45* 24*   IR @ 90 Degrees @45*45*      Last PN:   progressing 6/7/21 Active sh flexion: supine 143 degrees/after PPT exercises 150 degrees  IR @ 45*: 70*  ER at 45*: 64  Current:  flexion 155*, abduction 95*, ER 58* @90*, IR 15* @90*progressing 6/9/21  AROM Flexion: 139* no compensation at lumbar spine - 6/14/21    Progressing 6/21/21 Right Shoulder AAROM ABD: 112*  IR in 75* of ABD: 75*        5.  Pt FOTO score will increase by >/= 26 points to show improvement in subjective function.   Eval: 43  Last PN: 56  Current:reassess at visit 15         PLAN  [x]  Upgrade activities as tolerated     []  Continue plan of care  []  Update interventions per flow sheet       []  Discharge due to:_  []  Other:_      Vannessa Jane, PT, DPT 6/21/2021  2:43 PM    Future Appointments   Date Time Provider Juliette Ames   7/7/2021  1:30 PM Remesic, Early Severance [facility]   7/12/2021  3:45 PM Tricia Hoover, PT, DPT [facility]

## 2021-06-23 ENCOUNTER — APPOINTMENT (OUTPATIENT)
Dept: PHYSICAL THERAPY | Age: 41
End: 2021-06-23
Payer: COMMERCIAL

## 2021-07-07 ENCOUNTER — HOSPITAL ENCOUNTER (OUTPATIENT)
Dept: PHYSICAL THERAPY | Age: 41
Discharge: HOME | End: 2021-07-07
Payer: COMMERCIAL

## 2021-07-07 PROCEDURE — 97140 MANUAL THERAPY 1/> REGIONS: CPT

## 2021-07-07 PROCEDURE — 97530 THERAPEUTIC ACTIVITIES: CPT

## 2021-07-07 PROCEDURE — 97110 THERAPEUTIC EXERCISES: CPT

## 2021-07-07 NOTE — PROGRESS NOTES
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  Pt will be independent with HEP to encourage prophylaxis. Eval: HEP dispensed   Last PN: compliance per pt report goal MET 21      2. Pt right shoulder flexion PROM will achieve 150deg to demonstrate improved shoulder mobility to be able to progress per protocol. Eval: right shoulder flexion PROM 78deg  Last PN:  21 progressing Active sh flexion: supine 143 degrees/after PPT exercises 150  Current: 156* AROM goal MET 21     Long Term Goals: LTG- To be accomplished in 8 week(s):  1.  Pt will report >/=80% improvement in symptoms to be able to complete ADLs with increased ease. Eval: 4/10 pain at worst by end of day. Unable to put hair up and complete ADLs with ease  Last PN: no pain, but still limited in ADLs per reports   Current: 70% improvement,  3-4/10 max pain with most difficulty with putting stuff on shefl higher than shoulder height, increased soreness with trying on a lot of shirts porgressing well 21     2.  Pt bilateral shoulder strength will improve in all planes to 5/5 MMT to allow pt to return to working out.   Eval: left UE all planes 5/5, unable to assess right UE due to protocol. Will assess when appropriate  Last PN:: Progressing 6/7/21 progressing as per tolerance to exercises.      Current: progressing 7/7/21    L (1-5) R (1-5)   Flexors 5 3+   Abductors 5 3+   External Rotators 5  4+   Internal Rotators 5 4+   Serratus 4+  3+   Rhomboids  5 4+   Lower Trapezius  5  4+   Elbow Flexion 5 5    Elbow Extension 5  5      3. Pt right  strength will be equal to the left to allow pt to lift weights working out with increased ease. Eval: right 63#, left 80#  Last PN: 5/28/21  strength:  right 75#, left 80#  Current: therapist forgot, will reassess NV     4.  Pt right shoulder AROM will improve to WFL to allow pt to shower and put hair up with increased ease. Eval:  PROM Right   Flexion 78*   ABD  58*   ER @ 90 Degrees @45* 24*   IR @ 90 Degrees @45*45*      Last PN:   progressing 6/7/21 Active sh flexion: supine 143 degrees/after PPT exercises 150 degrees  IR @ 45*: 70*  ER at 45*: 64     Current: 156* flexion, 172* abduction, 27* IR, 60* ER progressing well 7/7/21      5.  Pt FOTO score will increase by >/= 26 points to show improvement in subjective function. Eval: 43  Last PN: 56  Current: 67 progressing well 7/7/21     Key Functional Changes: Pt has been seen 15 visits including initial evaluation S/P right Shoulder arthroscopic subacromial decompression, distal clavical excision, RCR, and longhead biceps tenodesis DOS 3/25/21. Pt also had right cubital tunnel and carpal tunnel release DOS 2/25/31. Pt overall is making good progress towards goals with reporting ability to wash hair without bending over and increasing ease with ADLs with most challenged with placing object on a shelf overhead and some increased pain with trying on multiple shirts at once. Pt reporting max pain now 4/10 in right shoulder with activity mostly anterior.  Right shoulder AROM has improved nicely with now [facility] in all planes except IR and ER with decreased thoracic extension as well. Right shoulder strength is improving however continues to be limited especially with shoulder flexion and abduction possibly contributing to pain with reaching overhead. Pt would benefit from continued skilled PT services to address remaining strength and ROM deficits to allow pt to return to working out and complete ADLs with increased ease.  Pt has dropped down to 1x week to save remaining visits due to insurance.      Updated Goals: to be achieved in 9 treatments:   See goals above    ASSESSMENT/RECOMMENDATIONS:   [x]Continue therapy per initial plan/protocol at a frequency of  1 x per week for 9 weeks  []Continue therapy with the following recommended changes:_____________________      _____________________________________________________________________  []Discontinue therapy progressing towards or have reached established goals  []Discontinue therapy due to lack of appreciable progress towards goals  []Discontinue therapy due to lack of attendance or compliance  []Await Physician's recommendations/decisions regarding therapy  []Other:________________________________________________________________    Thank you for this referral.   Valentine Mota 7/7/2021 3:47 PM

## 2021-07-12 ENCOUNTER — HOSPITAL ENCOUNTER (OUTPATIENT)
Dept: PHYSICAL THERAPY | Age: 41
Discharge: HOME | End: 2021-07-12
Payer: COMMERCIAL

## 2021-07-12 PROCEDURE — 97110 THERAPEUTIC EXERCISES: CPT

## 2021-07-12 PROCEDURE — 97140 MANUAL THERAPY 1/> REGIONS: CPT

## 2021-07-12 PROCEDURE — 97112 NEUROMUSCULAR REEDUCATION: CPT

## 2021-07-12 NOTE — PROGRESS NOTES
PT DAILY TREATMENT NOTE    Patient Name: Jocelyn Hutson  Date:2021  : 1980  [x]  Patient  Verified  Payor: jA Huerta / Plan: Juliana Dent RPN / Product Type: Commerical /    In time:11:33  Out time:12:25  Total Treatment Time (min): 52  Total Timed Codes (min): 52  1:1 Treatment Time ( only): 52   Visit #: 16 of 24    Treatment Area: Pain radiating to right shoulder [M25.511]    SUBJECTIVE  Pain Level (0-10 scale): 0  Any medication changes, allergies to medications, adverse drug reactions, diagnosis change, or new procedure performed?: [x] No    [] Yes (see summary sheet for update)  Subjective functional status/changes:   [] No changes reported  Noticing some clicking with forward movements. Some tightness in the pecs. OBJECTIVE      27 min Therapeutic Exercise:  [x] See flow sheet :   Rationale: increase ROM and increase strength to improve the patients ability to perform daily activities with decreased pain and symptom levels     15 min Neuromuscular Re-education:  [x]  See flow sheet :   Rationale: increase strength, improve coordination and increase proprioception  to improve the patients ability to perform daily activities with decreased pain and symptom levels    10 min Manual Therapy:  MILAD infraclavicular rib pump with active breath, STM to right lats, subscap and UT in left s/l, scap mobs in left s/l   Rationale: decrease pain, increase ROM and increase tissue extensibility to improve the patients ability to perform daily activities with decreased pain and symptom levels  The manual therapy interventions were performed at a separate and distinct time from the therapeutic activities interventions.           With   [] TE   [] TA   [] neuro   [] other: Patient Education: [x] Review HEP    [] Progressed/Changed HEP based on:   [] positioning   [] body mechanics   [] transfers   [] heat/ice application    [] other:      Other Objective/Functional Measures:   Right shoulder flexion 164*, ER 75*, IR 30*   No pain with TRX rows  No clicking with QP Y's and SB w's    Pain Level (0-10 scale) post treatment: 0    ASSESSMENT/Changes in Function: Pt is progressing well pre protocol with reporting no pain in shoulder with ADLs and working out only tightness in pecs at times and some clicking with forward elevation movements possibly due to continued scapular weakness. Able to complete forward elevation exercises in QP and over SB without clicking due to improved positioning. Right shoulder ROM continues to improve each visit with most limitation in IR still. Patient will continue to benefit from skilled PT services to modify and progress therapeutic interventions, address functional mobility deficits, address ROM deficits, address strength deficits, analyze and address soft tissue restrictions, analyze and cue movement patterns, analyze and modify body mechanics/ergonomics, assess and modify postural abnormalities and instruct in home and community integration to attain remaining goals. []  See Plan of Care  []  See progress note/recertification  []  See Discharge Summary         Progress towards goals / Updated goals:  Long Term Goals: LTG- To be accomplished in 8 week(s):  1.  Pt will report >/=80% improvement in symptoms to be able to complete ADLs with increased ease. Eval: 4/10 pain at worst by end of day. Unable to put hair up and complete ADLs with ease  Last PN: 70% improvement,  3-4/10 max pain with most difficulty with putting stuff on shefl higher than shoulder height, increased soreness with trying on a lot of shirts  Current:      2.  Pt bilateral shoulder strength will improve in all planes to 5/5 MMT to allow pt to return to working out. Eval: left UE all planes 5/5, unable to assess right UE due to protocol.  Will assess when appropriate  Last PN: progressing 7/7/21    L (1-5) R (1-5)   Flexors 5 3+   Abductors 5 3+   External Rotators 5  4+   Internal Rotators 5 4+   Serratus 4+  3+   Rhomboids  5 4+   Lower Trapezius  5  4+   Elbow Flexion 5 5    Elbow Extension 5  5      Current:     3. Pt right  strength will be equal to the left to allow pt to lift weights working out with increased ease. Eval: right 63#, left 80#  Last PN: 5/28/21  strength:  right 75#, left 80#  Current:      4.  Pt right shoulder AROM will improve to WFL to allow pt to shower and put hair up with increased ease. Eval:  PROM Right   Flexion 78*   ABD  58*   ER @ 90 Degrees @45* 24*   IR @ 90 Degrees @45*45*      Last PN:156* flexion, 172* abduction, 27* IR, 60* ER  Current: Right shoulder flexion 164*, ER 75*, IR 30* progressing 7/12/21     5.  Pt FOTO score will increase by >/= 26 points to show improvement in subjective function.   Eval: 43  Last PN: 67  Current:       PLAN  []  Upgrade activities as tolerated     [x]  Continue plan of care  []  Update interventions per flow sheet       []  Discharge due to:_  []  Other:_      Paige Mota 7/12/2021  11:35 AM    Future Appointments   Date Time Provider [place]   7/22/2021  5:15 PM Sara Crowley, PT, DPT [facility]   7/27/2021  3:00 PM Sara Crowley, PT, DPT [facility]

## 2021-07-22 ENCOUNTER — APPOINTMENT (OUTPATIENT)
Dept: PHYSICAL THERAPY | Age: 41
End: 2021-07-22
Payer: COMMERCIAL

## 2021-07-27 ENCOUNTER — HOSPITAL ENCOUNTER (OUTPATIENT)
Dept: PHYSICAL THERAPY | Age: 41
Discharge: HOME | End: 2021-07-27
Payer: COMMERCIAL

## 2021-07-27 PROCEDURE — 97110 THERAPEUTIC EXERCISES: CPT

## 2021-07-27 PROCEDURE — 97140 MANUAL THERAPY 1/> REGIONS: CPT

## 2021-07-27 PROCEDURE — 97112 NEUROMUSCULAR REEDUCATION: CPT

## 2021-07-27 NOTE — PROGRESS NOTES
PT DAILY TREATMENT NOTE    Patient Name: Hawa Chavez  Date:2021  : 1980  [x]  Patient  Verified  Payor: Vernell Corea / Plan: Vita ADHIKARI / Product Type: Commerical /    In time:3:02 pm  Out time:4:05 pm   Total Treatment Time (min): 63  Total Timed Codes (min): 63  Visit #: 17 of 24    Treatment Dx: Pain radiating to right shoulder [M25.511]    SUBJECTIVE  Pain Level (0-10 scale): 0  Any medication changes, allergies to medications, adverse drug reactions, diagnosis change, or new procedure performed?: [x] No    [] Yes (see summary sheet for update)  Subjective functional status/changes:   [] No changes reported  \"I still have to wear a different bra because it gets so sore in the front. 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      OBJECTIVE    33 min Therapeutic Exercise:  [x] See flow sheet :   Rationale: increase ROM, increase strength, improve coordination and increase proprioception to improve the patients ability to perform daily activities with decreased pain and symptom levels       22 min Neuromuscular Re-education:  [x]  See flow sheet : included scapular PNFs, PNF D2 Flex and ext with manual resistance to proximal to elbow and rhythmic stabilizations in flexion    Rationale: increase ROM, increase strength, improve coordination and increase proprioception  to improve the patients ability to perform daily activities with decreased pain and symptom levels      8 min Manual Therapy:  Sternal mobilization with active pelvic tilt, Superior T4 (MILAD technique) MILAD infraclavicular rib pump with active breath    Rationale: decrease pain, increase ROM, increase tissue extensibility, decrease trigger points and increase postural awareness to improve the patients ability to perform daily activities with decreased pain and symptom levels    The manual therapy interventions were performed at a separate and distinct time from the therapeutic activities interventions. With   [] TE   [] TA   [] neuro   [] other: Patient Education: [x] Review HEP    [] Progressed/Changed HEP based on:   [] positioning   [] body mechanics   [] transfers   [] heat/ice application    [] other:      Other Objective/Functional Measures:   MILAD Special Tests (pre/post)  HGIR:                                                 Right: 60/74             Left: 70/80  Hip Add Drop Test:                           Right: +/-            Left:+/-  Trunk Rot                                           Right: 14 in    Left 14 in        Pain Level (0-10 scale) post treatment: 0    ASSESSMENT/Changes in Function:   Progressed exercises to T8 ext in standing - noted asymmetry in right shoulder flexion in standing. Added S/L hip lift to address right lower trap. Very challenged with rhythmic stabs. Decreased clicking with resisted PNF's with scapular stability. Added body saws for progressive serratus strengthening. Patient will continue to benefit from skilled PT services to modify and progress therapeutic interventions, address functional mobility deficits, address ROM deficits, address strength deficits, analyze and address soft tissue restrictions, analyze and cue movement patterns, analyze and modify body mechanics/ergonomics, assess and modify postural abnormalities and instruct in home and community integration to attain remaining goals. [x]  See Plan of Care  []  See progress note/recertification  []  See Discharge Summary         Progress towards goals / Updated goals:  Long Term Goals: LTG- To be accomplished in 8 week(s):  1.  Pt will report >/=80% improvement in symptoms to be able to complete ADLs with increased ease. Eval: 4/10 pain at worst by end of day.  Unable to put hair up and complete ADLs with ease  Last PN: 70% improvement,  3-4/10 max pain with most difficulty with putting stuff on shefl higher than shoulder height, increased soreness with trying on a lot of shirts  Current:      2.  Pt bilateral shoulder strength will improve in all planes to 5/5 MMT to allow pt to return to working out. Eval: left UE all planes 5/5, unable to assess right UE due to protocol. Will assess when appropriate  Last PN: progressing 7/7/21    L (1-5) R (1-5)   Flexors 5 3+   Abductors 5 3+   External Rotators 5  4+   Internal Rotators 5 4+   Serratus 4+  3+   Rhomboids  5 4+   Lower Trapezius  5  4+   Elbow Flexion 5 5    Elbow Extension 5  5      Current: progressing 7/27/21 - challenged with serratus facilitation      3. Pt right  strength will be equal to the left to allow pt to lift weights working out with increased ease. Eval: right 63#, left 80#  Last PN: 5/28/21  strength:  right 75#, left 80#  Current:      4.  Pt right shoulder AROM will improve to WFL to allow pt to shower and put hair up with increased ease. Eval:  PROM Right   Flexion 78*   ABD  58*   ER @ 90 Degrees @45* 24*   IR @ 90 Degrees @45*45*      Last PN:156* flexion, 172* abduction, 27* IR, 60* ER  Current: Right shoulder flexion 164*, ER 75*, IR 30* progressing 7/12/21     5.  Pt FOTO score will increase by >/= 26 points to show improvement in subjective function.   Eval: 43  Last PN: 67  Current: assess next session       PLAN  [x]  Upgrade activities as tolerated     []  Continue plan of care  []  Update interventions per flow sheet       []  Discharge due to:_  []  Other:_      Jen Davis, PT, DPT 7/27/2021  3:13 PM    Future Appointments   Date Time Provider [place]   8/2/2021  8:30 AM Elina Rivers, PT, DPT [facility] medications

## 2021-08-02 ENCOUNTER — HOSPITAL ENCOUNTER (OUTPATIENT)
Dept: PHYSICAL THERAPY | Age: 41
Discharge: HOME | End: 2021-08-02
Payer: COMMERCIAL

## 2021-08-02 PROCEDURE — 97110 THERAPEUTIC EXERCISES: CPT

## 2021-08-02 PROCEDURE — 97112 NEUROMUSCULAR REEDUCATION: CPT

## 2021-08-02 PROCEDURE — 97140 MANUAL THERAPY 1/> REGIONS: CPT

## 2021-08-02 NOTE — PROGRESS NOTES
PT DAILY TREATMENT NOTE    Patient Name: Waqar Rendon  Date:2021  : 1980  [x]  Patient  Verified  Payor: María Handy / Plan: Karlee Barry RPN / Product Type: Commerical /    In time:8:33 am   Out time:9:30 am  Total Treatment Time (min): 57  Total Timed Codes (min): 57  Visit #: 18 of 24    Treatment Dx: Pain radiating to right shoulder [M25.511]    SUBJECTIVE  Pain Level (0-10 scale): 0  Any medication changes, allergies to medications, adverse drug reactions, diagnosis change, or new procedure performed?: [x] No    [] Yes (see summary sheet for update)  Subjective functional status/changes:   [] No changes reported  Pt reported having some clikcing with dynamic activities example - reaching to plug items in, unweighted barbell clean    OBJECTIVE      20 min Therapeutic Exercise:  [x] See flow sheet :   Rationale: increase ROM, increase strength, improve coordination and increase proprioception to improve the patients ability to perform daily activities with decreased pain and symptom levels       27 min Neuromuscular Re-education:  [x]  See flow sheet :included scapular PNFs, PNF D2 Flex and ext with manual resistance to proximal to elbow and rhythmic stabilizations in flexion    Rationale: increase ROM, increase strength, improve coordination and increase proprioception  to improve the patients ability to perform daily activities with decreased pain and symptom levels      10 min Manual Therapy:  Sternal mobilization with active pelvic tilt, Superior T4 (MILAD technique) MILAD infraclavicular rib pump with active breath    Rationale: decrease pain, increase ROM, increase tissue extensibility and increase postural awareness to improve the patients ability to perform daily activities with decreased pain and symptom levels    The manual therapy interventions were performed at a separate and distinct time from the therapeutic activities interventions.            With   [] TE   [] TA   [] neuro   [] other: Patient Education: [x] Review HEP    [] Progressed/Changed HEP based on:   [] positioning   [] body mechanics   [] transfers   [] heat/ice application    [] other:      Other Objective/Functional Measures:   MILAD Special Tests (pre/post)  HGIR:                                                 Right: 60/80             Left: 70/90  Hip Add Drop Test:                           Right: midline/-         Left:midline/-  Trunk Rot                                           Right: 14.5 in/14 in Left 15 in  /14 in  Shoulder Horizontal Abduction          Right: 40              Left: 33       L (1-5) R (1-5)   Flexors 5 4-   Abductors 5 4-   External Rotators 5 4-   Internal Rotators 5 4+   Serratus 4+  4   Scaption   5 4-           Pain Level (0-10 scale) post treatment: 0    ASSESSMENT/Changes in Function:   Pt is demonstrating good progress with PT. Is very motivated for participation and is compliant with all HEP outside of PT. 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 At present pt is most limited with dynamic overhead activity. Has intermittent clicking at right shoulder. Have been focusing on serratus and lower trap facilitation to improve scapular stability to then address strengthening. Patient will continue to benefit from skilled PT services to modify and progress therapeutic interventions, address functional mobility deficits, address ROM deficits, address strength deficits, analyze and address soft tissue restrictions, analyze and cue movement patterns, analyze and modify body mechanics/ergonomics, assess and modify postural abnormalities and instruct in home and community integration to attain remaining goals.      [x]  See Plan of Care  []  See progress note/recertification  []  See Discharge Summary         Progress towards goals / Updated goals:  Long Term Goals: LTG- To be accomplished in 8 week(s):  1.  Pt will report >/=80% improvement in symptoms to be able to complete ADLs with increased ease. Eval: 4/10 pain at worst by end of day. Unable to put hair up and complete ADLs with ease  Last PN: 70% improvement,  3-4/10 max pain with most difficulty with putting stuff on shefl higher than shoulder height, increased soreness with trying on a lot of shirts  Current: Progressing 8/3/21 pt reports 75% improvement - more challenged with dynamic activities  Unable to demonstrate overhead lifts at work     2.  Pt bilateral shoulder strength will improve in all planes to 5/5 MMT to allow pt to return to working out. Eval: left UE all planes 5/5, unable to assess right UE due to protocol. Will assess when appropriate  Last PN: progressing 7/7/21    L (1-5) R (1-5)   Flexors 5 3+   Abductors 5 3+   External Rotators 5  4+   Internal Rotators 5 4+   Serratus 4+  3+   Rhomboids  5 4+   Lower Trapezius  5  4+   Elbow Flexion 5 5    Elbow Extension 5  5      Current: progressing 8/3/21 - challenged with serratus facilitation        L (1-5) R (1-5)   Flexors 5 4-   Abductors 5 4-   External Rotators 5 4-   Internal Rotators 5 4+   Serratus 4+  4   Scaption   5 4-        3. Pt right  strength will be equal to the left to allow pt to lift weights working out with increased ease. Eval: right 63#, left 80#  Last PN: 5/28/21  strength:  right 75#, left 80#  Current:      4.  Pt right shoulder AROM will improve to WFL to allow pt to shower and put hair up with increased ease. Eval:  PROM Right   Flexion 78*   ABD  58*   ER @ 90 Degrees @45* 24*   IR @ 90 Degrees @45*45*      Last PN:156* flexion, 172* abduction, 27* IR, 60* ER  Current: Right shoulder flexion 164*, ER 75*, IR 69* progressing 8/3/21     5.  Pt FOTO score will increase by >/= 26 points to show improvement in subjective function.   Eval: 43  Last PN: 67  Current: no change 8/3/21 67       PLAN  []  Upgrade activities as tolerated     []  Continue plan of care  []  Update interventions per flow sheet       [] Discharge due to:_  []  Other:_      Aleshia Jacobo, PT, DPT 8/2/2021  8:43 AM    No future appointments.

## 2021-08-25 ENCOUNTER — HOSPITAL ENCOUNTER (OUTPATIENT)
Dept: PHYSICAL THERAPY | Age: 41
Discharge: HOME | End: 2021-08-25
Payer: COMMERCIAL

## 2021-08-25 PROCEDURE — 97110 THERAPEUTIC EXERCISES: CPT

## 2021-08-25 PROCEDURE — 97140 MANUAL THERAPY 1/> REGIONS: CPT

## 2021-08-25 PROCEDURE — 97112 NEUROMUSCULAR REEDUCATION: CPT

## 2021-08-25 NOTE — PROGRESS NOTES
[facility] at the [address], [place], [address] [facility]  Phone: [phone]      Fax:  [phone]    Progress Note  Patient name: Erendira Herrera Start of Care: 4/15/21   Referral source: Mony Sosa MD : 1980   Medical/Treatment Diagnosis: Pain radiating to right shoulder [M25.511] Onset Date:DOS 3/25/21     Prior Hospitalization: see medical history Provider#: [id number]   Medications: Verified on Patient Summary List    Comorbidities: :[employer] repair when 14yo, L4/L5 spinal fusion 10/2017, Right carpal and cubital tunnel release DOS 21, EDS, depression   Prior Level of Function: working out, crossfit, complete ADLs with ease and no pain    Visits from Start of Care: 19   Missed Visits: 1    Progress Towards Goals:   [address]- To be accomplished in 8 week(s):  1.  Pt will report >/=80% improvement in symptoms to be able to complete ADLs with increased ease. Eval: 4/10 pain at worst by end of day. Unable to put hair up and complete ADLs with ease  Last PN: 70% improvement,  3-4/10 max pain with most difficulty with putting stuff on shefl higher than shoulder height, increased soreness with trying on a lot of shirts  Current: Progressing  pt reports 75% improvement - more challenged with dynamic activities and c/o clicking with reaching overhead and in front  Unable to demonstrate overhead lifts at work     2.  Pt bilateral shoulder strength will improve in all planes to 5/5 MMT to allow pt to return to working out. Eval: left UE all planes 5/5, unable to assess right UE due to protocol.  Will assess when appropriate  Last PN: progressing 21    L (1-5) R (1-5)   Flexors 5 3+   Abductors 5 3+   External Rotators 5  4+   Internal Rotators 5 4+   Serratus 4+  3+   Rhomboids  5 4+   Lower Trapezius  5  4+   Elbow Flexion 5 5    Elbow Extension 5  5      Current: progressing 8/3/21 - challenged with serratus facilitation        L (1-5) R (1-5)   Flexors 5 4-   Abductors 5 4-   External Rotators 5 4-   Internal Rotators 5 4+   Serratus 4+  4   Scaption   5 4-         3. Pt right  strength will be equal to the left to allow pt to lift weights working out with increased ease. Eval: right 63#, left 80#  Last PN: 5/28/21  strength:  right 75#, left 80#  Current: will reassess NV     4.  Pt right shoulder AROM will improve to WFL to allow pt to shower and put hair up with increased ease. Eval:  PROM Right   Flexion 78*   ABD  58*   ER @ 90 Degrees @45* 24*   IR @ 90 Degrees @45*45*      Last PN:156* flexion, 172* abduction, 27* IR, 60* ER  Current: right shoulder flexion 160*, abduction 174*, ER 75*, IR 41* goal MET except IR 8/25/21     5.  Pt FOTO score will increase by >/= 26 points to show improvement in subjective function. Eval: 43  Last PN: 67  Current: no change 8/2/21 67     Key Functional Changes: Pt has been seen 19 visits including initial evaluation S/P right Shoulder arthroscopic subacromial decompression, distal clavical excision, RCR, and longhead biceps tenodesis DOS 3/25/21. Pt also had right cubital tunnel and carpal tunnel release DOS 2/25/31. Pt is very motivated for participation and is compliant with HEP outside of PT. 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 Right shoulder ROM is now [facility] except IR with some pain at end ranges however decreased thoracic extension noted. Pt continues to demonstrate decreased serratus and lower trap strength with consistent cues to maintain scapular protraction however able to decrease clicking with recruiting scapular stabilizers.  Pt would bene fot from continued skilled PT services to address remaining strength and ROM deficits to allow pt to complete all work duties without pain or clicking in right shoulder.      Updated Goals: to be achieved in 8 treatments:   See goals above  ASSESSMENT/RECOMMENDATIONS:  [x]Continue therapy per initial plan/protocol at a frequency of  8 visits   []Continue therapy with the following recommended changes:_____________________      _____________________________________________________________________  []Discontinue therapy progressing towards or have reached established goals  []Discontinue therapy due to lack of appreciable progress towards goals  []Discontinue therapy due to lack of attendance or compliance  []Await Physician's recommendations/decisions regarding therapy  []Other:________________________________________________________________    Thank you for this referral.   Tim Mota 8/25/2021 12:21 PM

## 2021-08-25 NOTE — PROGRESS NOTES
PT DAILY TREATMENT NOTE    Patient Name: Vin Reid  Date:2021  : 1980  [x]  Patient  Verified  Payor: Chris Leyva / Plan: Yanely Kerr RPN / Product Type: Commerical /    In time:10:08  Out time:11:10  Total Treatment Time (min): 62  Total Timed Codes (min): 62  1:1 Treatment Time ( only): 62   Visit #: 19 of 24    Treatment Dx: Pain radiating to right shoulder [M25.511]    SUBJECTIVE  Pain Level (0-10 scale): 0  Any medication changes, allergies to medications, adverse drug reactions, diagnosis change, or new procedure performed?: [x] No    [] Yes (see summary sheet for update)  Subjective functional status/changes:   [] No changes reported  Pt reporting increased clicking in right shoulder with reaching forward and overhead starting last week with decreasing some with massage and cupping. OBJECTIVE      30 min Therapeutic Exercise:  [x] See flow sheet :   Rationale: increase ROM and increase strength to improve the patients ability to perform daily activities with decreased pain and symptom levels     20 min Neuromuscular Re-education:  [x]  See flow sheet : tactile cues for scapular protraction with Sheila single arm lat pull, wall slides and 3 point row   Rationale: increase strength, improve coordination and increase proprioception  to improve the patients ability to perform daily activities with decreased pain and symptom levels    12 min Manual Therapy:  MILAD infraclavicular rib pump with active breath, STM to right scalenes, SCM, UT in supine, STM to right lats in left s/l   Rationale: decrease pain, increase ROM and increase tissue extensibility to improve the patients ability to perform daily activities with decreased pain and symptom levels  The manual therapy interventions were performed at a separate and distinct time from the therapeutic activities interventions.     With   [] TE   [] TA   [] neuro   [] other: Patient Education: [x] Review HEP    [] Progressed/Changed HEP based on:   [] positioning   [] body mechanics   [] transfers   [] heat/ice application    [] other:      Other Objective/Functional Measures: see goals below  Decreased clicking with increased thoracic protraction  Lower trap fatigue with wall slides with TB     Pain Level (0-10 scale) post treatment: 0    ASSESSMENT/Changes in Function: Pt has been seen 19 visits including initial evaluation S/P right Shoulder arthroscopic subacromial decompression, distal clavical excision, RCR, and longhead biceps tenodesis DOS 3/25/21. Pt also had right cubital tunnel and carpal tunnel release DOS 2/25/31. Pt is very motivated for participation and is compliant with HEP outside of PT. 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 Right shoulder ROM is now [facility] except IR with some pain at end ranges however decreased thoracic extension noted. Pt continues to demonstrate decreased serratus and lower trap strength with consistent cues to maintain scapular protraction however able to decrease clicking with recruiting scapular stabilizers. Pt would bene fot from continued skilled PT services to address remaining strength and ROM deficits to allow pt to complete all work duties without pain or clicking in right shoulder. Patient will continue to benefit from skilled PT services to modify and progress therapeutic interventions, address functional mobility deficits, address ROM deficits, address strength deficits, analyze and address soft tissue restrictions, analyze and cue movement patterns, analyze and modify body mechanics/ergonomics, assess and modify postural abnormalities and instruct in home and community integration\ to attain remaining goals.      [x]  See Plan of Care  []  See progress note/recertification  []  See Discharge Summary         Progress towards goals / Updated goals:  Long Term Goals: LTG- To be accomplished in 8 week(s):  1.  Pt will report >/=80% improvement in symptoms to be able to complete ADLs with increased ease. Eval: 4/10 pain at worst by end of day. Unable to put hair up and complete ADLs with ease  Last PN: 70% improvement,  3-4/10 max pain with most difficulty with putting stuff on shefl higher than shoulder height, increased soreness with trying on a lot of shirts  Current: Progressing 825/21 pt reports 75% improvement - more challenged with dynamic activities and c/o clicking with reaching overhead and in front  Unable to demonstrate overhead lifts at work     2.  Pt bilateral shoulder strength will improve in all planes to 5/5 MMT to allow pt to return to working out. Eval: left UE all planes 5/5, unable to assess right UE due to protocol. Will assess when appropriate  Last PN: progressing 7/7/21    L (1-5) R (1-5)   Flexors 5 3+   Abductors 5 3+   External Rotators 5  4+   Internal Rotators 5 4+   Serratus 4+  3+   Rhomboids  5 4+   Lower Trapezius  5  4+   Elbow Flexion 5 5    Elbow Extension 5  5      Current: progressing 8/3/21 - challenged with serratus facilitation        L (1-5) R (1-5)   Flexors 5 4-   Abductors 5 4-   External Rotators 5 4-   Internal Rotators 5 4+   Serratus 4+  4   Scaption   5 4-         3. Pt right  strength will be equal to the left to allow pt to lift weights working out with increased ease. Eval: right 63#, left 80#  Last PN: 5/28/21  strength:  right 75#, left 80#  Current: will reassess NV     4.  Pt right shoulder AROM will improve to WFL to allow pt to shower and put hair up with increased ease.   Eval:  PROM Right   Flexion 78*   ABD  58*   ER @ 90 Degrees @45* 24*   IR @ 90 Degrees @45*45*      Last PN:156* flexion, 172* abduction, 27* IR, 60* ER  Current: right shoulder flexion 160*, abduction 174*, ER 75*, IR 41* goal MET except IR 8/25/21     5.  Pt FOTO score will increase by >/= 26 points to show improvement in subjective function.   Eval: 43  Last PN: 67  Current: no change 8/2/21 67          PLAN  []  Upgrade activities as tolerated     [x]  Continue plan of care  []  Update interventions per flow sheet       []  Discharge due to:_  []  Other:_      Ciscoseferinose Reddy 8/25/2021  10:07 AM    Future Appointments   Date Time Provider [place]   8/26/2021 12:15 PM Champ Mota [facility]   8/31/2021 12:15 PM Duayne Holter, PT, DPT [facility]

## 2021-08-26 ENCOUNTER — APPOINTMENT (OUTPATIENT)
Dept: PHYSICAL THERAPY | Age: 41
End: 2021-08-26
Payer: COMMERCIAL

## 2021-08-30 ENCOUNTER — HOSPITAL ENCOUNTER (OUTPATIENT)
Dept: PHYSICAL THERAPY | Age: 41
Discharge: HOME | End: 2021-08-30
Payer: COMMERCIAL

## 2021-08-30 PROCEDURE — 97140 MANUAL THERAPY 1/> REGIONS: CPT

## 2021-08-30 PROCEDURE — 97530 THERAPEUTIC ACTIVITIES: CPT

## 2021-08-30 PROCEDURE — 97110 THERAPEUTIC EXERCISES: CPT

## 2021-08-30 PROCEDURE — 97112 NEUROMUSCULAR REEDUCATION: CPT

## 2021-08-31 ENCOUNTER — HOSPITAL ENCOUNTER (OUTPATIENT)
Dept: PHYSICAL THERAPY | Age: 41
Discharge: HOME | End: 2021-08-31
Payer: COMMERCIAL

## 2021-08-31 PROCEDURE — 97110 THERAPEUTIC EXERCISES: CPT

## 2021-08-31 PROCEDURE — 97140 MANUAL THERAPY 1/> REGIONS: CPT

## 2021-08-31 PROCEDURE — 97112 NEUROMUSCULAR REEDUCATION: CPT

## 2021-08-31 PROCEDURE — 97530 THERAPEUTIC ACTIVITIES: CPT

## 2021-08-31 NOTE — PROGRESS NOTES
PT DAILY TREATMENT NOTE    Patient Name: Erendira Herrera  Date:2021  : 1980  [x]  Patient  Verified  Payor: Marga Andrew / Plan: Emerita Number RPN / Product Type: Commerical /    In time:12:26 pm  Out time:1:26 pm   Total Treatment Time (min): 60  Total Timed Codes (min): 60  Visit #: 21 of 27    Treatment Dx: Pain radiating to right shoulder [M25.511]    SUBJECTIVE  Pain Level (0-10 scale): 0  Any medication changes, allergies to medications, adverse drug reactions, diagnosis change, or new procedure performed?: [x] No    [] Yes (see summary sheet for update)  Subjective functional status/changes:   [] No changes reported  \"It clicked this morning while I was doing floor presses. \"  Reported no painful clicking but previously had painful clicking     OBJECTIVE      15 min Therapeutic Exercise:  [x] See flow sheet :   Rationale: increase ROM, increase strength, improve coordination and increase proprioception to improve the patients ability to perform daily activities with decreased pain and symptom levels      10 min Therapeutic Activity:  [x]  See flow sheet :   Rationale: increase ROM, increase strength, improve coordination and increase proprioception  to improve the patients ability to perform daily activities with decreased pain and symptom levels       20 min Neuromuscular Re-education:  [x]  See flow sheet :   Rationale: increase ROM, increase strength, improve coordination and increase proprioception  to improve the patients ability to perform daily activities with decreased pain and symptom levels      15 min Manual Therapy:  MILAD AIC correction, Sternal mobilization with active pelvic tilt, Superior T4 (MILAD technique), Right subclavius release (MILAD technique), MILAD infraclavicular rib pump with active breath   STM to right scalenes  Functional massage and active release to bilateral proximal scalenes  Right sibson's release   Obtained consent for hand placement    Rationale: decrease pain, increase ROM, increase tissue extensibility, decrease trigger points and increase postural awareness to improve the patients ability to perform daily activities with decreased pain and symptom levels    The manual therapy interventions were performed at a separate and distinct time from the therapeutic activities interventions. With   [] TE   [] TA   [] neuro   [] other: Patient Education: [x] Review HEP    [] Progressed/Changed HEP based on:   [] positioning   [] body mechanics   [] transfers   [] heat/ice application    [] other:      Other Objective/Functional Measures:   MILAD Special Tests (pre/post)  HGIR:                                                 Right: 71/86             Left: 75/90  Hip Add Drop Test:                           Right: + w/ drop/-    Left:+/2 in from table   Trunk Rot                                           Right: 14 in   Left 14 in    Shoulder Horizontal Abduction          Right: 43              Left: 38 /NT     palpable crepitus at anterior shoulder with flexion-  Suspect deltoid     Pain Level (0-10 scale) post treatment: 0    ASSESSMENT/Changes in Function:   Pt reported continued intermittent clicking and popping. Continued elevated shoulder and poor lower trap and serratus recruitment. Challenged with serratus squat. Has follow-up appointment with MD this afternoon. Patient will continue to benefit from skilled PT services to modify and progress therapeutic interventions, address functional mobility deficits, address ROM deficits, address strength deficits, analyze and address soft tissue restrictions, analyze and cue movement patterns, analyze and modify body mechanics/ergonomics, assess and modify postural abnormalities and instruct in home and community integration to attain remaining goals.      [x]  See Plan of Care  []  See progress note/recertification  []  See Discharge Summary         Progress towards goals / Updated goals:  Long Term Goals: LTG- To be accomplished in 8 week(s):  1.  Pt will report >/=80% improvement in symptoms to be able to complete ADLs with increased ease. Eval: 4/10 pain at worst by end of day. Unable to put hair up and complete ADLs with ease  Last PN: Progressing 825/21 pt reports 75% improvement - more challenged with dynamic activities and c/o clicking with reaching overhead and in front  Unable to demonstrate overhead lifts at work     2.  Pt bilateral shoulder strength will improve in all planes to 5/5 MMT to allow pt to return to working out. Eval: left UE all planes 5/5, unable to assess right UE due to protocol. Will assess when appropriate  Last PN: progressing 7/7/21    L (1-5) R (1-5)   Flexors 5 3+   Abductors 5 3+   External Rotators 5  4+   Internal Rotators 5 4+   Serratus 4+  3+   Rhomboids  5 4+   Lower Trapezius  5  4+   Elbow Flexion 5 5    Elbow Extension 5  5      Current: progressing 8/3/21 - challenged with serratus facilitation        L (1-5) R (1-5)   Flexors 5 4-   Abductors 5 4-   External Rotators 5 4-   Internal Rotators 5 4+   Serratus 4+  4   Scaption   5 4-         3. Pt right  strength will be equal to the left to allow pt to lift weights working out with increased ease. Eval: right 63#, left 80#  Last PN: will reassess NV     4.  Pt right shoulder AROM will improve to WFL to allow pt to shower and put hair up with increased ease. Eval:  PROM Right   Flexion 78*   ABD  58*   ER @ 90 Degrees @45* 24*   IR @ 90 Degrees @45*45*      Last PN:right shoulder flexion 160*, abduction 174*, ER 75*, IR 41* goal MET except IR 8/25/21  Current: Progressing/MET IR 84*     5.  Pt FOTO score will increase by >/= 26 points to show improvement in subjective function.   Eval: 43  Last PN: no change 8/2/21 67       PLAN  [x]  Upgrade activities as tolerated     []  Continue plan of care  []  Update interventions per flow sheet       []  Discharge due to:_  []  Other:_      Candice Alvarado, PT, DPT 8/31/2021  12:37 PM    No future appointments.

## 2021-09-16 ENCOUNTER — HOSPITAL ENCOUNTER (OUTPATIENT)
Dept: PHYSICAL THERAPY | Age: 41
Discharge: HOME | End: 2021-09-16
Payer: COMMERCIAL

## 2021-09-16 PROCEDURE — 97530 THERAPEUTIC ACTIVITIES: CPT

## 2021-09-16 PROCEDURE — 97110 THERAPEUTIC EXERCISES: CPT

## 2021-09-16 PROCEDURE — 97112 NEUROMUSCULAR REEDUCATION: CPT

## 2021-09-16 PROCEDURE — 97140 MANUAL THERAPY 1/> REGIONS: CPT

## 2021-09-16 NOTE — PROGRESS NOTES
PT DAILY TREATMENT NOTE    Patient Name: Jocelyn Hutson  Date:2021  : 1980  [x]  Patient  Verified  Payor: Aj Huerta / Plan: Juliana Dent RPN / Product Type: Commerical /    In time:3:46  Out time:4:46  Total Treatment Time (min): 60  Total Timed Codes (min): 60  1:1 Treatment Time ( only): 60   Visit #: 22 of 27    Treatment Dx: Pain radiating to right shoulder [M25.511]    SUBJECTIVE  Pain Level (0-10 scale): 0  Any medication changes, allergies to medications, adverse drug reactions, diagnosis change, or new procedure performed?: [x] No    [] Yes (see summary sheet for update)  Subjective functional status/changes:   [] No changes reported  Pt reporting continued clicking in shoulder and increased frustration because of it. Pt reporting still unable to wear bra on right side all day due to pain and irritation.       OBJECTIVE      15 min Therapeutic Exercise:  [x] See flow sheet :   Rationale: increase ROM and increase strength to improve the patients ability to perform daily activities with decreased pain and symptom levels    10 min Therapeutic Activity:  []  See flow sheet :   Rationale: increase strength, improve coordination and increase proprioception  to improve the patients ability to perform daily activities with decreased pain and symptom levels     20 min Neuromuscular Re-education:  []  See flow sheet :   Rationale: increase strength, improve coordination and increase proprioception  to improve the patients ability to perform daily activities with decreased pain and symptom levels    15 min Manual Therapy:right subclavius release (MILAD technique), MILAD infraclavicular rib pump with active breath   Functional massage and active release to bilateral proximal scalenes  Right sibson's release   scap mobs in left s/l with active assisted scap PNF to decrease inferior border winging   Obtained consent for hand placement    Rationale: decrease pain, increase ROM and increase tissue extensibility to improve the patients ability to perform daily activities with decreased pain and symptom levels  The manual therapy interventions were performed at a separate and distinct time from the therapeutic activities interventions. With   [] TE   [] TA   [] neuro   [] other: Patient Education: [x] Review HEP    [] Progressed/Changed HEP based on:   [] positioning   [] body mechanics   [] transfers   [] heat/ice application    [] other:      Other Objective/Functional Measures:   Inferior scapular winging  Fatigue with side plank lift with reach     Pain Level (0-10 scale) post treatment: 0    ASSESSMENT/Changes in Function: Pt reporting continued clicking in right shoulder possibly due to scapular dyskinesia with inferior border winging noted today. Pt continues to demonstrate flat thoracic spine as well however improving in prayer and QP position. Pt responded well to scap mobs in s/l with manual resistance. Patient will continue to benefit from skilled PT services to modify and progress therapeutic interventions, address functional mobility deficits, address ROM deficits, address strength deficits, analyze and address soft tissue restrictions, analyze and cue movement patterns, analyze and modify body mechanics/ergonomics, assess and modify postural abnormalities and instruct in home and community integration to attain remaining goals. [x]  See Plan of Care  []  See progress note/recertification  []  See Discharge Summary         Progress towards goals / Updated goals:  Long Term Goals: LTG- To be accomplished in 8 week(s):  1.  Pt will report >/=80% improvement in symptoms to be able to complete ADLs with increased ease. Eval: 4/10 pain at worst by end of day.  Unable to put hair up and complete ADLs with ease  Last PN: Progressing 825/21 pt reports 75% improvement - more challenged with dynamic activities and c/o clicking with reaching overhead and in front  Unable to demonstrate overhead lifts at work  Current: pt still c/o clicking in right shoulder with reaching overhead with unable to complete lifts overhead without pain, unable to wear bra strap on right for entire day 9/16/21     2.  Pt bilateral shoulder strength will improve in all planes to 5/5 MMT to allow pt to return to working out. Eval: left UE all planes 5/5, unable to assess right UE due to protocol. Will assess when appropriate  Last PN:  progressing 8/3/21 - challenged with serratus facilitation     L (1-5) R (1-5)   Flexors 5 4-   Abductors 5 4-   External Rotators 5 4-   Internal Rotators 5 4+   Serratus 4+  4   Scaption   5 4-      Current: fatigued with prayer Y's with TB progressing 9/16/21     3. Pt right  strength will be equal to the left to allow pt to lift weights working out with increased ease. Eval: right 63#, left 80#  Last PN: will reassess NV     4.  Pt right shoulder AROM will improve to WFL to allow pt to shower and put hair up with increased ease. Eval:  PROM Right   Flexion 78*   ABD  58*   ER @ 90 Degrees @45* 24*   IR @ 90 Degrees @45*45*      Last PN:right shoulder flexion 160*, abduction 174*, ER 75*, IR 41* goal MET except IR 8/25/21  Current: Progressing/MET IR 84*     5.  Pt FOTO score will increase by >/= 26 points to show improvement in subjective function.   Eval: 43  Last PN: no change 8/2/21 67   Current: will reassess visit 24      PLAN  []  Upgrade activities as tolerated     [x]  Continue plan of care  []  Update interventions per flow sheet       []  Discharge due to:_  []  Other:_      Inder Mota 9/16/2021  3:54 PM    Future Appointments   Date Time Provider [place] Ames   9/22/2021 10:00 AM Zach Johnson PT, DPT [facility]   9/29/2021  9:15 AM Inder Mota [facility]   10/6/2021  1:30 PM Inder Mota [facility]   10/13/2021  1:30 PM Inder Mota [facility]   10/20/2021  9:15 AM Zach Johnson PT, DPT [facility]

## 2021-09-22 ENCOUNTER — HOSPITAL ENCOUNTER (OUTPATIENT)
Dept: PHYSICAL THERAPY | Age: 41
Discharge: HOME | End: 2021-09-22
Payer: COMMERCIAL

## 2021-09-22 PROCEDURE — 97530 THERAPEUTIC ACTIVITIES: CPT

## 2021-09-22 PROCEDURE — 97140 MANUAL THERAPY 1/> REGIONS: CPT

## 2021-09-22 PROCEDURE — 97112 NEUROMUSCULAR REEDUCATION: CPT

## 2021-09-22 NOTE — PROGRESS NOTES
[facility] at the [address], [place], [address] [facility]  Phone: [phone]      Fax:  [phone]    Progress Note  Patient name: Erendira Herrera Start of Care: 4/15/21   Referral source: Mony Sosa MD : 1980   Medical/Treatment Diagnosis: Pain radiating to right shoulder [M25.511] Onset Date:DOS 3/25/21      Prior Hospitalization: see medical history Provider#: [id number]   Medications: Verified on Patient Summary List     Comorbidities: :[employer] repair when 12yo, L4/L5 spinal fusion 10/2017, Right carpal and cubital tunnel release DOS 21, EDS, depression   Prior Level of Function: working out, crossfit, complete ADLs with ease and no pain     Visits from Start of Care: 23    Missed Visits: 1    Progress Towards Goals:   [address]- To be accomplished in 8 week(s):  1.  Pt will report >/=80% improvement in symptoms to be able to complete ADLs with increased ease. Eval: 4/10 pain at worst by end of day. Unable to put hair up and complete ADLs with ease  Last PN: Progressing  pt reports 75% improvement - more challenged with dynamic activities and c/o clicking with reaching overhead and in front  Unable to demonstrate overhead lifts at work  Current: pt still c/o clicking in right shoulder with reaching overhead with unable to complete lifts overhead without pain, unable to wear bra strap on right for entire day 21     2.  Pt bilateral shoulder strength will improve in all planes to 5/5 MMT to allow pt to return to working out. Eval: left UE all planes 5/5, unable to assess right UE due to protocol. Will assess when appropriate  Last PN:  progressing 8/3/21 - challenged with serratus facilitation     L (1-5) R (1-5)   Flexors 5 4-   Abductors 5 4-   External Rotators 5 4-   Internal Rotators 5 4+   Serratus 4+  4   Scaption   5 4-      Current: fatigued with prayer Y's with TB progressing 21     3.  Pt right  strength will be equal to the left to allow pt to lift weights working out with increased ease. Eval: right 63#, left 80#  Last PN: will reassess NV     4.  Pt right shoulder AROM will improve to WFL to allow pt to shower and put hair up with increased ease. Eval:  PROM Right   Flexion 78*   ABD  58*   ER @ 90 Degrees @45* 24*   IR @ 90 Degrees @45*45*      Last PN:right shoulder flexion 160*, abduction 174*, ER 75*, IR 41* goal MET except IR 8/25/21  Current: Progressing/MET IR 84*     5.  Pt FOTO score will increase by >/= 26 points to show improvement in subjective function. Eval: 43  Last PN: no change 8/2/21 67   Current: will reassess visit 24        Key Functional Changes:   Pt has been seen 19 visits including initial evaluation S/P right Shoulder arthroscopic subacromial decompression, distal clavical excision, RCR, and longhead biceps tenodesis DOS 3/25/21. Pt also had right cubital tunnel and carpal tunnel release DOS 2/25/31. Pt is very motivated for participation and is compliant with HEP outside of PT. 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 At present primary complaint and limitation is in \"clicking\" at right clavicle and scapula with any pressing or OH motion. Able to decrease with oblique and lower trap facilitation. Continues to report increased sensitivity at right UT. Have been addressing right scapular elevation and tipping at inferior angle. Updated Goals: to be achieved in 5 treatments:  1. Pt will be able to perform floor presses and other lifts without clicking and with good mechanics to allow pt to perform job related tasks. 2. Pt will report decreased Right UT sensitivity and be able to wear all clothing without restriction to improve quality of life.      ASSESSMENT/RECOMMENDATIONS:  [x]Continue therapy per initial plan/protocol at a frequency of  5 treatments  []Continue therapy with the following recommended changes:_____________________      _____________________________________________________________________  []Discontinue therapy progressing towards or have reached established goals  []Discontinue therapy due to lack of appreciable progress towards goals  []Discontinue therapy due to lack of attendance or compliance  []Await Physician's recommendations/decisions regarding therapy  []Other:________________________________________________________________    Thank you for this referral.   Oscar Howard, PT, DPT 9/22/2021 12:50 PM

## 2021-09-22 NOTE — PROGRESS NOTES
PT DAILY TREATMENT NOTE    Patient Name: Ernestina Antony  Date:2021  : 1980  [x]  Patient  Verified  Payor: Stephane Life / Plan: [address] RPN / Product Type: Commerical /    In time:10:11 am  Out time:11:05 am  Total Treatment Time (min): 56  Total Timed Codes (min): 56  Visit #: 23 of 27    Treatment Dx: Pain radiating to right shoulder [M25.511]    SUBJECTIVE  Pain Level (0-10 scale): 0  Any medication changes, allergies to medications, adverse drug reactions, diagnosis change, or new procedure performed?: [x] No    [] Yes (see summary sheet for update)  Subjective functional status/changes:   [] No changes reported  \"Any time I go to put my hand over head it clicks and it feels weird. \"  Reported activities that cause clicking include putting hair in towel cause clicking     OBJECTIVE      10 min Therapeutic Activity:  [x]  See flow sheet :discussion of possible Rock tape techniques to address overactive right UT   Rationale: increase ROM, increase strength, improve coordination and increase proprioception  to improve the patients ability to perform daily activities with decreased pain and symptom levels       25 min Neuromuscular Re-education:  [x]  See flow sheet :   Rationale: increase ROM, increase strength, improve coordination and increase proprioception  to improve the patients ability to perform daily activities with decreased pain and symptom levels      20 min Manual Therapy:  Mora taping to inhibit right UT  MILAD AIC correction, Sternal mobilization with active pelvic tilt, Superior T4 (MILAD technique), Right subclavius release (MILAD technique), MILAD infraclavicular rib pump with active breath   Right sibson's release  STM to SCMs and scalenes   Obtained consent for hand placement      Rationale: decrease pain, increase ROM, increase tissue extensibility, decrease trigger points and increase postural awareness to improve the patients ability to perform daily activities with decreased pain and symptom levels    The manual therapy interventions were performed at a separate and distinct time from the therapeutic activities interventions. With   [] TE   [] TA   [] neuro   [] other: Patient Education: [x] Review HEP    [] Progressed/Changed HEP based on:   [] positioning   [] body mechanics   [] transfers   [] heat/ice application    [] other:      Other Objective/Functional Measures:   MILAD Special Tests (pre/post)  HGIR:                                                 Right: 64/85             Left: 76/90  Hip Add Drop Test:                           Right: +/-            Left:+/-  Trunk Rot                                           Right: 14 in/NT  Left 14 in  /NT  Shoulder Horizontal Abduction          Right: 45 /NT             Left: 33 /NT    Palpable clicking at right clavicle and scapula (superior border) with table press and reaching OH - decreased with standing T8 Ext with lower trap faciliation        Pain Level (0-10 scale) post treatment: 0    ASSESSMENT/Changes in Function:   Pt has been seen 19 visits including initial evaluation S/P right Shoulder arthroscopic subacromial decompression, distal clavical excision, RCR, and longhead biceps tenodesis DOS 3/25/21. Pt also had right cubital tunnel and carpal tunnel release DOS 2/25/31. Pt is very motivated for participation and is compliant with HEP outside of PT. 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 At present primary complaint and limitation is in \"clicking\" at right clavicle and scapula with any pressing or OH motion. Able to decrease with oblique and lower trap facilitation. Continues to report increased sensitivity at right UT. Have been addressing right scapular elevation and tipping at inferior angle.      Patient will continue to benefit from skilled PT services to modify and progress therapeutic interventions, address functional mobility deficits, address ROM deficits, address strength deficits, analyze and address soft tissue restrictions, analyze and cue movement patterns, analyze and modify body mechanics/ergonomics, assess and modify postural abnormalities and instruct in home and community integration to attain remaining goals. [x]  See Plan of Care  []  See progress note/recertification  []  See Discharge Summary         Progress towards goals / Updated goals:  Long Term Goals: LTG- To be accomplished in 8 week(s):  1.  Pt will report >/=80% improvement in symptoms to be able to complete ADLs with increased ease. Eval: 4/10 pain at worst by end of day. Unable to put hair up and complete ADLs with ease  Last PN: Progressing 825/21 pt reports 75% improvement - more challenged with dynamic activities and c/o clicking with reaching overhead and in front  Unable to demonstrate overhead lifts at work  Current: pt still c/o clicking in right shoulder with reaching overhead with unable to complete lifts overhead without pain, unable to wear bra strap on right for entire day 9/22/21     2.  Pt bilateral shoulder strength will improve in all planes to 5/5 MMT to allow pt to return to working out. Eval: left UE all planes 5/5, unable to assess right UE due to protocol. Will assess when appropriate  Last PN:  progressing 8/3/21 - challenged with serratus facilitation     L (1-5) R (1-5)   Flexors 5 4-   Abductors 5 4-   External Rotators 5 4-   Internal Rotators 5 4+   Serratus 4+  4   Scaption   5 4-      Current: fatigued with prayer Y's with TB progressing 9/16/21     3. Pt right  strength will be equal to the left to allow pt to lift weights working out with increased ease. Eval: right 63#, left 80#  Last PN: will reassess NV     4.  Pt right shoulder AROM will improve to WFL to allow pt to shower and put hair up with increased ease.   Eval:  PROM Right Flexion 78*   ABD  58*   ER @ 90 Degrees @45* 24*   IR @ 90 Degrees @45*45*      Last PN:right shoulder flexion 160*, abduction 174*, ER 75*, IR 41* goal MET except IR 8/25/21  Current: Progressing/MET IR 84*     5.  Pt FOTO score will increase by >/= 26 points to show improvement in subjective function.   Eval: 43  Last PN: no change 8/2/21 67   Current: will reassess visit 24      PLAN  []  Upgrade activities as tolerated     []  Continue plan of care  []  Update interventions per flow sheet       []  Discharge due to:_  []  Other:_      Oscar Howard, PT, DPT 9/22/2021  10:01 AM    Future Appointments   Date Time Provider [place]   9/29/2021  9:15 AM Luis Mota THE [facility]   10/6/2021  1:30 PM Luis Mota THE [facility]   10/13/2021  1:30 PM Luis Mota THE [facility]   10/20/2021  9:15 AM Nia James, PT, DPT [facility] THE [facility]

## 2021-09-29 ENCOUNTER — HOSPITAL ENCOUNTER (OUTPATIENT)
Dept: PHYSICAL THERAPY | Age: 41
Discharge: HOME | End: 2021-09-29
Payer: COMMERCIAL

## 2021-09-29 PROCEDURE — 97112 NEUROMUSCULAR REEDUCATION: CPT

## 2021-09-29 PROCEDURE — 97140 MANUAL THERAPY 1/> REGIONS: CPT

## 2021-09-29 PROCEDURE — 97110 THERAPEUTIC EXERCISES: CPT

## 2021-09-29 NOTE — PROGRESS NOTES
PT DAILY TREATMENT NOTE    Patient Name: Bernarda Alcala  Date:2021  : 1980  [x]  Patient  Verified  Payor: Wendy Warren / Plan: Jose M De La Garza RPN / Product Type: Commerical /    In time:9:20  Out time:10:15  Total Treatment Time (min): 55  Total Timed Codes (min): 55  1:1 Treatment Time (MC/BCBS only): 55   Visit #: 24 of 28    Treatment Dx: Pain radiating to right shoulder [M25.511]    SUBJECTIVE  Pain Level (0-10 scale): 3-4  Any medication changes, allergies to medications, adverse drug reactions, diagnosis change, or new procedure performed?: [x] No    [] Yes (see summary sheet for update)  Subjective functional status/changes:   [] No changes reported  Pt reporting pull sled with left shoulder with pain since the competition this weekend. Pt reporting still unable to lift overhead. OBJECTIVE      25 min Therapeutic Exercise:  [x] See flow sheet :   Rationale: increase ROM and increase strength to improve the patients ability to perform daily activities with decreased pain and symptom levels     30 min Neuromuscular Re-education:  [x]  See flow sheet :   Rationale: increase strength, improve coordination and increase proprioception  to improve the patients ability to perform daily activities with decreased pain and symptom levels    10 min Manual Therapy:  Right apical expansion, infraclavicular rib pump with active breath, right subsclviaus release    Rationale: decrease pain, increase ROM and increase tissue extensibility to improve the patients ability to perform daily activities with decreased pain and symptom levels  The manual therapy interventions were performed at a separate and distinct time from the therapeutic activities interventions.     With   [] TE   [] TA   [] neuro   [] other: Patient Education: [x] Review HEP    [] Progressed/Changed HEP based on:   [] positioning   [] body mechanics   [] transfers   [] heat/ice application    [] other:      Other Objective/Functional Measures:    strength 85# right, 45# left  No clicking with landmine stagger stance AFIR trunk rotation   Unable to complete kneeling landmine Y's due to pain however no pain without landmine  No pain with standing diagonals with left oblique     Pain Level (0-10 scale) post treatment: 0    ASSESSMENT/Changes in Function: Pt reporting clicking and pain with reaching overhead however able to decrease with lower trap and left oblique facilitation today. Good response to left reach down with right diagonals with no pain reported. Added spilt stance AFIR with trunk rotation and seated balloon to HEP today to with oblique and lower trap recruitment as well as UT inhibition.  strength is now [facility] and equal to that of left. Patient will continue to benefit from skilled PT services to modify and progress therapeutic interventions, address functional mobility deficits, address ROM deficits, address strength deficits, analyze and address soft tissue restrictions, analyze and cue movement patterns, analyze and modify body mechanics/ergonomics, assess and modify postural abnormalities and instruct in home and community integration to attain remaining goals. [x]  See Plan of Care  []  See progress note/recertification  []  See Discharge Summary         Progress towards goals / Updated goals:  Long Term Goals: LTG- To be accomplished in 8 week(s):  1.  Pt will report >/=80% improvement in symptoms to be able to complete ADLs with increased ease. Eval: 4/10 pain at worst by end of day. Unable to put hair up and complete ADLs with ease  Last PN[de-identified] pt still c/o clicking in right shoulder with reaching overhead with unable to complete lifts overhead without pain, unable to wear bra strap on right for entire day 9/22/21  Current:      2.  Pt bilateral shoulder strength will improve in all planes to 5/5 MMT to allow pt to return to working out. Eval: left UE all planes 5/5, unable to assess right UE due to protocol. Will assess when appropriate  Last PN: fatigued with prayer Y's with TB progressing 9/16/21  Curret:      3. Pt right  strength will be equal to the left to allow pt to lift weights working out with increased ease. Eval: right 63#, left 80#  Current: :85# right, left 80# goal MET 9/29/21     4.  Pt right shoulder AROM will improve to WFL to allow pt to shower and put hair up with increased ease. Eval:  PROM Right   Flexion 78*   ABD  58*   ER @ 90 Degrees @45* 24*   IR @ 90 Degrees @45*45*      Last PNProgressing/MET IR 84*  Current:      5.  Pt FOTO score will increase by >/= 26 points to show improvement in subjective function. Eval: 43  Last PN: no change 8/2/21 67   Current: will reassess NV    Updated Goals: to be achieved in 5 treatments:  1. Pt will be able to perform floor presses and other lifts without clicking and with good mechanics to allow pt to perform job related tasks. Current:      2. Pt will report decreased Right UT sensitivity and be able to wear all clothing without restriction to improve quality of life.    Current:        PLAN  []  Upgrade activities as tolerated     [x]  Continue plan of care  []  Update interventions per flow sheet       []  Discharge due to:_  []  Other:_      Mary Jo Meza 9/29/2021  9:22 AM    Future Appointments   Date Time Provider [place]   10/6/2021  1:30 PM Mayito Mota THE [facility]   10/13/2021  1:30 PM Mayito Mota THE [facility]   10/20/2021  9:15 AM Bud García, PT, DPT [facility] THE [facility]

## 2021-10-06 ENCOUNTER — HOSPITAL ENCOUNTER (OUTPATIENT)
Dept: PHYSICAL THERAPY | Age: 41
Discharge: HOME | End: 2021-10-06
Payer: COMMERCIAL

## 2021-10-06 PROCEDURE — 97112 NEUROMUSCULAR REEDUCATION: CPT

## 2021-10-06 PROCEDURE — 97110 THERAPEUTIC EXERCISES: CPT

## 2021-10-06 PROCEDURE — 97140 MANUAL THERAPY 1/> REGIONS: CPT

## 2021-10-06 NOTE — PROGRESS NOTES
PT DAILY TREATMENT NOTE    Patient Name: Ino Treviño  Date:10/6/2021  : 1980  [x]  Patient  Verified  Payor: Amrita Hsieh / Plan: Leeann Song RPN / Product Type: Commerical /    In time:1:37  Out time:2:30  Total Treatment Time (min): 53  Total Timed Codes (min): 53  1:1 Treatment Time (MC/BCBS only): 48   Visit #: 25 of 28    Treatment Dx: Pain radiating to right shoulder [M25.511]    SUBJECTIVE  Pain Level (0-10 scale): 3-4  Any medication changes, allergies to medications, adverse drug reactions, diagnosis change, or new procedure performed?: [x] No    [] Yes (see summary sheet for update)  Subjective functional status/changes:   [] No changes reported  Pt reporting continued frustration with clicking with overhead and was clicking with new safety squat bar. Pt reporting continued irritation with wearing bra as well. OBJECTIVE      18 min Therapeutic Exercise:  [x] See flow sheet :   Rationale: increase ROM and increase strength to improve the patients ability to perform daily activities with decreased pain and symptom levels     25 min Neuromuscular Re-education:  [x]  See flow sheet :   Rationale: increase ROM, increase strength, improve coordination and increase proprioception  to improve the patients ability to perform daily activities with decreased pain and symptom levels    10 min Manual Therapy:  scap mobs in s/l, STM to lats and subscap in s/l, infraclavicular rib pumps   Rationale: decrease pain, increase ROM and increase tissue extensibility to improve the patients ability to perform daily activities with decreased pain and symptom levels  The manual therapy interventions were performed at a separate and distinct time from the therapeutic activities interventions.           With   [] TE   [] TA   [] neuro   [] other: Patient Education: [x] Review HEP    [] Progressed/Changed HEP based on:   [] positioning   [] body mechanics   [] transfers   [] heat/ice application    [] other: Other Objective/Functional Measures:   MILAD Special Tests (pre/post)  HGIR:                                                 Right: 57*/70*             Left: 63*/90*  Shoulder Flexion   Right: WFL/             Left: WFL/  Hip Add Drop Test:                           Right: + to midline/past midline            Left:past midline/-  Trunk Rot                                           Right: 14in/  13in  Left 14in /13in  Shoulder Horizontal Abduction          Right: 45* /             Left: 45* /      Pain Level (0-10 scale) post treatment: 0    ASSESSMENT/Changes in Function: Pt tolerated session well with reporting no pain post session. Pt reporting continued frustration with clicking with overhead and basic ADLs. Demonstrates continued lateral winging with scapular tipped forward however improving awareness and strength with ability to decrease with exercises. Pt responded well with supine crossover, paraspinal release and standing right reach with towel behind left shoulder blade. Improved MILAD measures post session as well with good apical expansion noted. Patient will continue to benefit from skilled PT services to modify and progress therapeutic interventions, address functional mobility deficits, address ROM deficits, address strength deficits, analyze and address soft tissue restrictions, analyze and cue movement patterns, analyze and modify body mechanics/ergonomics, assess and modify postural abnormalities and instruct in home and community integration to attain remaining goals. [x]  See Plan of Care  []  See progress note/recertification  []  See Discharge Summary         Progress towards goals / Updated goals:  Long Term Goals: LTG- To be accomplished in 8 week(s):  1.  Pt will report >/=80% improvement in symptoms to be able to complete ADLs with increased ease. Eval: 4/10 pain at worst by end of day.  Unable to put hair up and complete ADLs with ease  Last PN[de-identified] pt still c/o clicking in right shoulder with reaching overhead with unable to complete lifts overhead without pain, unable to wear bra strap on right for entire day 9/22/21  Current:      2.  Pt bilateral shoulder strength will improve in all planes to 5/5 MMT to allow pt to return to working out. Eval: left UE all planes 5/5, unable to assess right UE due to protocol. Will assess when appropriate  Last PN: fatigued with prayer Y's with TB progressing 9/16/21  Curret:      3. Pt right  strength will be equal to the left to allow pt to lift weights working out with increased ease. Eval: right 63#, left 80#  Current: :85# right, left 80# goal MET 9/29/21     4.  Pt right shoulder AROM will improve to WFL to allow pt to shower and put hair up with increased ease. Eval:  PROM Right   Flexion 78*   ABD  58*   ER @ 90 Degrees @45* 24*   IR @ 90 Degrees @45*45*      Last PNProgressing/MET IR 84*  Current: HGIR 70* at end of session progressing well 10/6/21     5.  Pt FOTO score will increase by >/= 26 points to show improvement in subjective function. Eval: 43  Last PN: no change 8/2/21 67   Current: will reassess NV     Updated Goals: to be achieved in 5 treatments:  1. Pt will be able to perform floor presses and other lifts without clicking and with good mechanics to allow pt to perform job related tasks. Current: able to complete < 10 floor presses before pain and clicking, able to complete 7 push ups off table in clinic before pain progressing 10/6/21      2.  Pt will report decreased Right UT sensitivity and be able to wear all clothing without restriction to improve quality of life.   Current:  no change since last PN 10/6/21      PLAN  []  Upgrade activities as tolerated     [x]  Continue plan of care  []  Update interventions per flow sheet       []  Discharge due to:_  []  Other:_      Sarah Matute 10/6/2021  1:49 PM    Future Appointments   Date Time Provider [place]   10/13/2021  1:30 PM Francisco Mota [facility] 10/20/2021  9:15 AM Cynthia Vivas PT, DPT [facility] THE [facility]

## 2021-10-13 ENCOUNTER — HOSPITAL ENCOUNTER (OUTPATIENT)
Dept: PHYSICAL THERAPY | Age: 41
Discharge: HOME | End: 2021-10-13
Payer: COMMERCIAL

## 2021-10-13 PROCEDURE — 97140 MANUAL THERAPY 1/> REGIONS: CPT

## 2021-10-13 PROCEDURE — 97112 NEUROMUSCULAR REEDUCATION: CPT

## 2021-10-13 PROCEDURE — 97110 THERAPEUTIC EXERCISES: CPT

## 2021-10-13 NOTE — PROGRESS NOTES
PT DAILY TREATMENT NOTE    Patient Name: Shania Waddell  Date:10/13/2021  : 1980  [x]  Patient  Verified  Payor: Rima Aparicio / Plan: Nathan Nurse RPN / Product Type: Commerical /    In time:1:38  Out time:2:45  Total Treatment Time (min): 67  Total Timed Codes (min): 67  1:1 Treatment Time (MC/BCBS only): 79   Visit #: 26 of 28    Treatment Dx: Pain radiating to right shoulder [M25.511]    SUBJECTIVE  Pain Level (0-10 scale): 3  Any medication changes, allergies to medications, adverse drug reactions, diagnosis change, or new procedure performed?: [x] No    [] Yes (see summary sheet for update)  Subjective functional status/changes:   [] No changes reported  \"Taping helped for a day. Still can;t wear normal bras but can wear a racer back now. \"    OBJECTIVE    27 min Therapeutic Exercise:  [x] See flow sheet :   Rationale: increase ROM and increase strength to improve the patients ability to perform daily activities with decreased pain and symptom levels     30 min Neuromuscular Re-education:  [x]  See flow sheet :   Rationale: increase strength, improve coordination and increase proprioception  to improve the patients ability to perform daily activities with decreased pain and symptom levels    10 min Manual Therapy:  STM to lats, vacuum cupping to right lats with active shoulder flexion and reach in lady in glasses, infraclavicular rib pump    Rationale: decrease pain, increase ROM and increase tissue extensibility to improve the patients ability to perform daily activities with decreased pain and symptom levels  The manual therapy interventions were performed at a separate and distinct time from the therapeutic activities interventions.     With   [] TE   [] TA   [] neuro   [] other: Patient Education: [x] Review HEP    [] Progressed/Changed HEP based on:   [] positioning   [] body mechanics   [] transfers   [] heat/ice application    [] other:      Other Objective/Functional Measures:   MILAD Special Tests (pre/post)  HGIR:                                                 Right: 50*/70*             Left: 55*/90*    Hip Add Drop Test:                           Right: midline/-            Left:-/-  Trunk Rot                                           Right: 14in/13.5in    Left 13.5in /13in    FOTO 63       Pain Level (0-10 scale) post treatment: 0    ASSESSMENT/Changes in Function: Pt tolerated session well with no pain post session. Pt continues to demonstrate lateral and inferior scapular winging with reaching overhead however able to decrease in s/l in lady in glasess with emphasis on reach prior to shoulder flexion. Improved measures post session with everything clearing with improved rib mobility noted with rib pumping. Patient will continue to benefit from skilled PT services to modify and progress therapeutic interventions, address functional mobility deficits, address ROM deficits, address strength deficits, analyze and address soft tissue restrictions, analyze and cue movement patterns, analyze and modify body mechanics/ergonomics, assess and modify postural abnormalities and instruct in home and community integration to attain remaining goals. [x]  See Plan of Care  []  See progress note/recertification  []  See Discharge Summary         Progress towards goals / Updated goals:  Long Term Goals: LTG- To be accomplished in 8 week(s):  1.  Pt will report >/=80% improvement in symptoms to be able to complete ADLs with increased ease. Eval: 4/10 pain at worst by end of day.  Unable to put hair up and complete ADLs with ease  Last PN[de-identified] pt still c/o clicking in right shoulder with reaching overhead with unable to complete lifts overhead without pain, unable to wear bra strap on right for entire day 9/22/21  Current: no change, still clicking in right shoulder with lifting overhead 10/13/21     2.  Pt bilateral shoulder strength will improve in all planes to 5/5 MMT to allow pt to return to working out.  Eval: left UE all planes 5/5, unable to assess right UE due to protocol. Will assess when appropriate  Last PN: fatigued with [employer] with TB progressing 9/16/21  Curret:      3. Pt right  strength will be equal to the left to allow pt to lift weights working out with increased ease. Eval: right 63#, left 80#  Current: :85# right, left 80# goal MET 9/29/21     4.  Pt right shoulder AROM will improve to WFL to allow pt to shower and put hair up with increased ease. Eval:  PROM Right   Flexion 78*   ABD  58*   ER @ 90 Degrees @45* 24*   IR @ 90 Degrees @45*45*      Last PNProgressing/MET IR 84*  Current: HGIR 70* at end of session progressing well 10/6/21     5.  Pt FOTO score will increase by >/= 26 points to show improvement in subjective function. Eval: 43  Last PN: no change 8/2/21 67   Current: 63 progressing 10/13/21     Updated Goals: to be achieved in 5 treatments:  1. Pt will be able to perform floor presses and other lifts without clicking and with good mechanics to allow pt to perform job related tasks.   Current: able to complete < 10 floor presses before pain and clicking, able to complete 7 push ups off table in clinic before pain progressing 10/6/21      2.  Pt will report decreased Right UT sensitivity and be able to wear all clothing without restriction to improve quality of life.   Current:  no change since last PN 10/6/21         PLAN  []  Upgrade activities as tolerated     [x]  Continue plan of care  []  Update interventions per flow sheet       []  Discharge due to:_  []  Other:_      Iain Nestor 10/13/2021  10:39 AM    Future Appointments   Date Time Provider [place]   10/13/2021  1:30 PM Rolando Mota [facility]   10/20/2021  9:15 AM Argelia Osman, PT, DPT [facility]

## 2021-10-20 ENCOUNTER — APPOINTMENT (OUTPATIENT)
Dept: PHYSICAL THERAPY | Age: 41
End: 2021-10-20
Payer: COMMERCIAL

## 2021-10-22 ENCOUNTER — HOSPITAL ENCOUNTER (OUTPATIENT)
Dept: PHYSICAL THERAPY | Age: 41
Discharge: HOME | End: 2021-10-22
Payer: COMMERCIAL

## 2021-10-22 PROCEDURE — 97112 NEUROMUSCULAR REEDUCATION: CPT

## 2021-10-22 PROCEDURE — 97140 MANUAL THERAPY 1/> REGIONS: CPT

## 2021-10-22 PROCEDURE — 97110 THERAPEUTIC EXERCISES: CPT

## 2021-10-22 NOTE — PROGRESS NOTES
PT DAILY TREATMENT NOTE    Patient Name: Korin Munoz  Date:10/22/2021  : 1980  [x]  Patient  Verified  Payor: Deane Frankel / Plan: Delisa Mckeon RPN / Product Type: Commerical /    In time:11:35  Out time:12:15  Total Treatment Time (min): 40  Total Timed Codes (min): 40  1:1 Treatment Time (MC/BCBS only): 40   Visit #: 27 of 28    Treatment Dx: Pain radiating to right shoulder [M25.511]    SUBJECTIVE  Pain Level (0-10 scale): 0  Any medication changes, allergies to medications, adverse drug reactions, diagnosis change, or new procedure performed?: [x] No    [] Yes (see summary sheet for update)  Subjective functional status/changes:   [] No changes reported  \"Still pain in my upper trap and clicking with reaching. \"    OBJECTIVE        8 min Therapeutic Exercise:  [x] See flow sheet :   Rationale: increase ROM and increase strength to improve the patients ability to perform daily activities with decreased pain and symptom levels     23 min Neuromuscular Re-education:  [x]  See flow sheet : Cupping to right lats in left s/l with active shoulder flexion and SA reach,    Rationale: increase strength, improve coordination and increase proprioception  to improve the patients ability to perform daily activities with decreased pain and symptom levels    9 min Manual Therapy:  STM to right UT, left levator, SOR, sibsons realease, infraclavicular rib pumping,    Rationale: decrease pain, increase ROM and increase tissue extensibility to improve the patients ability to perform daily activities with decreased pain and symptom levels  The manual therapy interventions were performed at a separate and distinct time from the therapeutic activities interventions.     With   [] TE   [] TA   [] neuro   [] other: Patient Education: [x] Review HEP    [] Progressed/Changed HEP based on:   [] positioning   [] body mechanics   [] transfers   [] heat/ice application    [] other:      Other Objective/Functional Measures: see goals below  MILAD Special Tests (pre/post)  HGIR:                                                 Right: 55*/80*             Left: 60*/90*  Shoulder Flexion   Right: WFL/             Left: WFL  Hip Add Drop Test:                           Right: -/            Left:-/  Trunk Rot                                           Right: 13in/    Left 13in /      Max pain 6/10 - burning in UT - wearing any bra  Shoulder flexion 4/5  Shoulder abduction 4/5  Shoulder ER/IR 4/5  Lower trap 4+/5, serratus and rhomboids 4/5   Right shoulder flexion 180*, abduction 170*, ER 90*, IR 55*       Pain Level (0-10 scale) post treatment: 0    ASSESSMENT/Changes in Function: Pt has attended 27 sessions including initial evaluation S/P right Shoulder arthroscopic subacromial decompression, distal clavical excision, RCR, and longhead biceps tenodesis DOS 3/25/21. Pt continues to be very compliant with HEP however still c/o \"clicking\" at right clavicle with overhead movement. Pt able to decrease \"clicking\" with left oblique and lower trap facilitation to decrease right scapular elevation and inferior tipping. Right shoulder ROM is [place] [place] however still limited in IR. Continued strength deficits noted as well with MMT possibly contributing to continued pain. Pt continues to c/o hypersensitively  at right UT as well with not able to wear bras at times because of it. 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       Patient will continue to benefit from skilled PT services to modify and progress therapeutic interventions, address functional mobility deficits, address ROM deficits, address strength deficits, analyze and address soft tissue restrictions, analyze and cue movement patterns, analyze and modify body mechanics/ergonomics, assess and modify postural abnormalities and instruct in home and community integration to attain remaining goals. [x]  See Plan of Care  []  See progress note/recertification  []  See Discharge Summary         Progress towards goals / Updated goals:  Long Term Goals: LTG- To be accomplished in 8 week(s):  1.  Pt will report >/=80% improvement in symptoms to be able to complete ADLs with increased ease. Eval: 4/10 pain at worst by end of day. Unable to put hair up and complete ADLs with ease  Last PN[de-identified] pt still c/o clicking in right shoulder with reaching overhead with unable to complete lifts overhead without pain, unable to wear bra strap on right for entire day 9/22/21  Current: 6/10 max pain in right UT, continued difficulty wearing bras however able to wear racer backs now with continued clicking with reaching overhead slow progress 10/22/21     2.  Pt bilateral shoulder strength will improve in all planes to 5/5 MMT to allow pt to return to working out. Eval: left UE all planes 5/5, unable to assess right UE due to protocol. Will assess when appropriate  Last PN: fatigued with [facility] [employer] with TB progressing 9/16/21   Curret: progressing 10/22/21  Shoulder flexion 4/5   Shoulder abduction 4/5   Shoulder ER/IR 4/5   Lower trap 4+/5, serratus and rhomboids 4/5      3. Pt right  strength will be equal to the left to allow pt to lift weights working out with increased ease. Eval: right 63#, left 80#  Current: :85# right, left 80# goal MET 9/29/21     4.  Pt right shoulder AROM will improve to WFL to allow pt to shower and put hair up with increased ease. Eval:  PROM Right   Flexion 78*   ABD  58*   ER @ 90 Degrees @45* 24*   IR @ 90 Degrees @45*45*      Last PNProgressing/MET IR 84*    Current: progressing 10/22/21  AROM Right   Flexion 180*   ABD  180*   ER @ 90 Degrees 90*   IR @ 90 Degrees 55* (80* post session)         5.  Pt FOTO score will increase by >/= 26 points to show improvement in subjective function.   Eval: 43  Last PN: no change 8/2/21 67   Current: 63 progressing 10/13/21     Updated Goals: to be achieved in 5 treatments:  1. Pt will be able to perform floor presses and other lifts without clicking and with good mechanics to allow pt to perform job related tasks.   Current:clicking still at times with presses's progressing 10/22/21      2.  Pt will report decreased Right UT sensitivity and be able to wear all clothing without restriction to improve quality of life.   Current:  still unable to wear bras due to pain and hypersensitivity progressing 10/22/21         PLAN  []  Upgrade activities as tolerated     [x]  Continue plan of care  []  Update interventions per flow sheet       []  Discharge due to:_  []  Other:_      Joey Newell 10/22/2021  10:35 AM    Future Appointments   Date Time Provider [place] Ames   10/22/2021 11:30 AM Anil Mota [facility]

## 2021-10-22 NOTE — PROGRESS NOTES
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- To be accomplished in 8 week(s):  1.  Pt will report >/=80% improvement in symptoms to be able to complete ADLs with increased ease. Eval: 4/10 pain at worst by end of day. Unable to put hair up and complete ADLs with ease  Last PN[de-identified] pt still c/o clicking in right shoulder with reaching overhead with unable to complete lifts overhead without pain, unable to wear bra strap on right for entire day 21  Current: 6/10 max pain in right UT, continued difficulty wearing bras however able to wear racer backs now with continued clicking with reaching overhead slow progress 10/22/21     2.  Pt bilateral shoulder strength will improve in all planes to 5/5 MMT to allow pt to return to working out. Eval: left UE all planes 5/5, unable to assess right UE due to protocol. Will assess when appropriate  Last PN: fatigued with [employer] with TB progressing 21              Curret: progressing 10/22/21  Shoulder flexion 4/5              Shoulder abduction 4/5              Shoulder ER/IR 4/5          Lower trap 4+/5, serratus and rhomboids 4/5      3.  Pt right  strength will be equal to the left to allow pt to lift weights working out with increased ease. Eval: right 63#, left 80#  Current: :85# right, left 80# goal MET 9/29/21     4.  Pt right shoulder AROM will improve to WFL to allow pt to shower and put hair up with increased ease. Eval:  PROM Right   Flexion 78*   ABD  58*   ER @ 90 Degrees @45* 24*   IR @ 90 Degrees @45*45*      Last PNProgressing/MET IR 84*     Current: progressing 10/22/21  AROM Right   Flexion 180*   ABD  180*   ER @ 90 Degrees 90*   IR @ 90 Degrees 55* (80* post session)          5.  Pt FOTO score will increase by >/= 26 points to show improvement in subjective function. Eval: 43  Last PN: no change 8/2/21 67   Current: 63 progressing 10/13/21     Updated Goals: to be achieved in 5 treatments:  1. Pt will be able to perform floor presses and other lifts without clicking and with good mechanics to allow pt to perform job related tasks.   Current:clicking still at times with presses's progressing 10/22/21      2. Pt will report decreased Right UT sensitivity and be able to wear all clothing without restriction to improve quality of life.   Current:  still unable to wear bras due to pain and hypersensitivity progressing 10/22/21  Long Term Goals: LTG- To be accomplished in 8 week(s):  1.  Pt will report >/=80% improvement in symptoms to be able to complete ADLs with increased ease. Eval: 4/10 pain at worst by end of day. Unable to put hair up and complete ADLs with ease  Last PN[de-identified] pt still c/o clicking in right shoulder with reaching overhead with unable to complete lifts overhead without pain, unable to wear bra strap on right for entire day 9/22/21  Current: 6/10 max pain in right UT, continued difficulty wearing bras however able to wear racer backs now with continued clicking with reaching overhead slow progress 10/22/21     2.  Pt bilateral shoulder strength will improve in all planes to 5/5 MMT to allow pt to return to working out.   Eval: left UE all planes 5/5, unable to assess right UE due to protocol. 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 Pt right  strength will be equal to the left to allow pt to lift weights working out with increased ease. Eval: right 63#, left 80#  Current: :85# right, left 80# goal MET 9/29/21     4.  Pt right shoulder AROM will improve to WFL to allow pt to shower and put hair up with increased ease. Eval:  PROM Right   Flexion 78*   ABD  58*   ER @ 90 Degrees @45* 24*   IR @ 90 Degrees @45*45*      Last PNProgressing/MET IR 84*     Current: progressing 10/22/21  AROM Right   Flexion 180*   ABD  180*   ER @ 90 Degrees 90*   IR @ 90 Degrees 55* (80* post session)          5.  Pt FOTO score will increase by >/= 26 points to show improvement in subjective function. Eval: 43  Last PN: no change 8/2/21 67   Current: 63 progressing 10/13/21     Updated Goals: to be achieved in 5 treatments:  1. Pt will be able to perform floor presses and other lifts without clicking and with good mechanics to allow pt to perform job related tasks.   Current:clicking still at times with presses's progressing 10/22/21      2. Pt will report decreased Right UT sensitivity and be able to wear all clothing without restriction to improve quality of life.   Current:  still unable to wear bras due to pain and hypersensitivity progressing 10/22/21      Key Functional Changes: Pt has attended 27 sessions including initial evaluation S/P right Shoulder arthroscopic subacromial decompression, distal clavical excision, RCR, and longhead biceps tenodesis DOS 3/25/21. Pt continues to be very compliant with HEP however still c/o \"clicking\" at right clavicle with overhead movement.  Pt able to decrease \"clicking\" with left oblique and lower trap facilitation to decrease right scapular elevation and inferior tipping. Right shoulder ROM is [facility] [place] however still limited in IR. Continued strength deficits noted as well with MMT possibly contributing to continued pain. Pt continues to c/o hypersensitively  at right UT as well with not able to wear bras at times because of it. 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       Updated Goals: to be achieved in 6 treatments:   See goals above    ASSESSMENT/RECOMMENDATIONS:  [x]Continue therapy per initial plan/protocol at a frequency of  1 x per week for 6 weeks  []Continue therapy with the following recommended changes:_____________________      _____________________________________________________________________  []Discontinue therapy progressing towards or have reached established goals  []Discontinue therapy due to lack of appreciable progress towards goals  []Discontinue therapy due to lack of attendance or compliance  []Await Physician's recommendations/decisions regarding therapy  []Other:________________________________________________________________    Thank you for this referral.   Kirk Mota 10/22/2021 10:41 AM

## 2021-10-28 ENCOUNTER — HOSPITAL ENCOUNTER (OUTPATIENT)
Dept: PHYSICAL THERAPY | Age: 41
Discharge: HOME | End: 2021-10-28
Payer: COMMERCIAL

## 2021-10-28 PROCEDURE — 97140 MANUAL THERAPY 1/> REGIONS: CPT

## 2021-10-28 PROCEDURE — 97110 THERAPEUTIC EXERCISES: CPT

## 2021-10-28 PROCEDURE — 97112 NEUROMUSCULAR REEDUCATION: CPT

## 2021-10-28 NOTE — PROGRESS NOTES
PT DAILY TREATMENT NOTE    Patient Name: Ivis Duncan  Date:10/28/2021  : 1980  [x]  Patient  Verified  Payor: Jarad Argueta / Plan:  RPN / Product Type: Commerical /    In time:3:07  Out time:4:00  Total Treatment Time (min): 53  Total Timed Codes (min): 53  1:1 Treatment Time (MC/BCBS only): 48   Visit #: 28 of 33    Treatment Dx: Pain radiating to right shoulder [M25.511]    SUBJECTIVE  Pain Level (0-10 scale): 0  Any medication changes, allergies to medications, adverse drug reactions, diagnosis change, or new procedure performed?: [x] No    [] Yes (see summary sheet for update)  Subjective functional status/changes:   [] No changes reported  \"I did some floor presses 12 times but then I wasn't     OBJECTIVE    20 min Therapeutic Exercise:  [x] See flow sheet :   Rationale: increase ROM and increase strength to improve the patients ability to perform daily activities with decreased pain and symptom levels    25 min Neuromuscular Re-education:  [x]  See flow sheet :   Rationale: increase strength, improve coordination and increase proprioception  to improve the patients ability to perform daily activities with decreased pain and symptom levels    8 min Manual Therapy:  Infraclavicular rib pumping, right apical expansion   Rationale: decrease pain, increase ROM and increase tissue extensibility to improve the patients ability to perform daily activities with decreased pain and symptom levels  The manual therapy interventions were performed at a separate and distinct time from the therapeutic activities interventions.     With   [] TE   [] TA   [] neuro   [] other: Patient Education: [x] Review HEP    [] Progressed/Changed HEP based on:   [] positioning   [] body mechanics   [] transfers   [] heat/ice application    [] other:      Other Objective/Functional Measures:   Mild thoracic extension with push up at table  Able to decrease pec recruitment with arm below 90* with ER with right reach in sitting  Right glut fatigue with right glut max, support needed for left abs  Right LE slightly behind left with squat  Increased paraspinal development on left compared to right      Pain Level (0-10 scale) post treatment: 0    ASSESSMENT/Changes in Function: Noted T8 compensation with exercises today with compensation possibly contributing to decreased ability to recruit lower trap on right. Added pelvic stability exercises today with pt easily fatigued with left s/l right glut max however support needed for left abs after 2 reps due to fatigue. Updated HEP to include left adductor pull back and right glut max to help decrease T8 compensation. Will continue to address pelvis next visit to improve left ab strength for support for right shoulder with reaching. Patient will continue to benefit from skilled PT services to modify and progress therapeutic interventions, address functional mobility deficits, address ROM deficits, address strength deficits, analyze and address soft tissue restrictions, analyze and cue movement patterns, analyze and modify body mechanics/ergonomics, assess and modify postural abnormalities and instruct in home and community integration to attain remaining goals. [x]  See Plan of Care  []  See progress note/recertification  []  See Discharge Summary         Progress towards goals / Updated goals:  Long Term Goals: LTG- To be accomplished in 8 week(s):  1.  Pt will report >/=80% improvement in symptoms to be able to complete ADLs with increased ease. Eval: 4/10 pain at worst by end of day. Unable to put hair up and complete ADLs with ease  Last PN: 6/10 max pain in right UT, continued difficulty wearing bras however able to wear racer backs now with continued clicking with reaching overhead slow progress 10/22/21  Current:      2.  Pt bilateral shoulder strength will improve in all planes to 5/5 MMT to allow pt to return to working out.   Eval: left UE all planes 5/5, unable to assess right UE due to protocol. Will assess when appropriate  Last PN:progressing 10/22/21  Shoulder flexion 4/5              Shoulder abduction 4/5              Shoulder ER/IR 4/5          Lower trap 4+/5, serratus and rhomboids 4/5     Current:      3. Pt right  strength will be equal to the left to allow pt to lift weights working out with increased ease. Eval: right 63#, left 80#  Last PN :85# right, left 80# goal MET 9/29/21     4.  Pt right shoulder AROM will improve to WFL to allow pt to shower and put hair up with increased ease. Eval:  PROM Right   Flexion 78*   ABD  58*   ER @ 90 Degrees @45* 24*   IR @ 90 Degrees @45*45*      Last PN: progressing 10/22/21  AROM Right   Flexion 180*   ABD  180*   ER @ 90 Degrees 90*   IR @ 90 Degrees 55* (80* post session)       Current:      5.  Pt FOTO score will increase by >/= 26 points to show improvement in subjective function. Eval: 43  Last PN:: 63 progressing 10/13/21  Current:      Updated Goals: to be achieved in 5 treatments:  1. Pt will be able to perform floor presses and other lifts without clicking and with good mechanics to allow pt to perform job related tasks.   Last PN:clicking still at times with presses's progressing 10/22/21   Current: able to complete 12 floor pressups without clicking however next set unable to complete 1 progressing 10/28/21     2.  Pt will report decreased Right UT sensitivity and be able to wear all clothing without restriction to improve quality of life.   Last PN still unable to wear bras due to pain and hypersensitivity progressing 10/22/21  Current:       PLAN  []  Upgrade activities as tolerated     [x]  Continue plan of care  []  Update interventions per flow sheet       []  Discharge due to:_  []  Other:_      Caron Ceron 10/28/2021  1:15 PM    Future Appointments   Date Time Provider Juliette Ames   10/28/2021  3:00 PM Kirk Mota [facility]

## 2021-11-02 ENCOUNTER — HOSPITAL ENCOUNTER (OUTPATIENT)
Dept: PHYSICAL THERAPY | Age: 41
Discharge: HOME | End: 2021-11-02
Payer: COMMERCIAL

## 2021-11-02 PROCEDURE — 97112 NEUROMUSCULAR REEDUCATION: CPT

## 2021-11-02 PROCEDURE — 97110 THERAPEUTIC EXERCISES: CPT

## 2021-11-02 PROCEDURE — 97530 THERAPEUTIC ACTIVITIES: CPT

## 2021-11-02 PROCEDURE — 97140 MANUAL THERAPY 1/> REGIONS: CPT

## 2021-11-02 NOTE — PROGRESS NOTES
PT DAILY TREATMENT NOTE    Patient Name: Ember Thornton  Date:2021  : 1980  [x]  Patient  Verified  Payor: Sharon Chávez / Plan: Tomer Hagan RPN / Product Type: Commerical /    In time:9:25  Out time:10:25  Total Treatment Time (min): 60  Total Timed Codes (min): 60  1:1 Treatment Time (MC/BCBS only): 60   Visit #: 29 of 33    Treatment Dx: Pain radiating to right shoulder [M25.511]    SUBJECTIVE  Pain Level (0-10 scale): 0  Any medication changes, allergies to medications, adverse drug reactions, diagnosis change, or new procedure performed?: [x] No    [] Yes (see summary sheet for update)  Subjective functional status/changes:   [] No changes reported  \"no pain . It just always clicks with overhead movement and push ups. The upper trap has been aching. \"     OBJECTIVE      20 min Therapeutic Exercise:  [] See flow sheet :   Rationale: increase ROM, increase strength, improve coordination, improve balance and increase proprioception to improve the patients ability to perform pain free ADL\"S     12 min Therapeutic Activity:  []  See flow sheet :   Rationale: increase ROM, increase strength, improve coordination, improve balance and increase proprioception  to improve the patients ability to perform pain free ADL's      20 min Neuromuscular Re-education:  []  See flow sheet :   Rationale: increase ROM, increase strength, improve coordination, improve balance and increase proprioception  to improve the patients ability to perform pain free ADL's     8 min Manual Therapy:  Cupping to right upper trap with balloon breathing   Bilateral rib mobilization with pec release    Rationale: decrease pain, increase ROM, increase tissue extensibility, decrease edema  and decrease trigger points to improve the patients ability to perform pain free ADL\"s   The manual therapy interventions were performed at a separate and distinct time from the therapeutic activities interventions.           With   [] TE   [x] TA [] neuro   [] other: Patient Education: [x] Review HEP    [] Progressed/Changed HEP based on:   [] positioning   [] body mechanics   [] transfers   [] heat/ice application    [] other:      Other Objective/Functional Measures:   MILAD Special Tests (pre/post)  HGIR:                                                 Right: 55/65             Left: 60/70  Shoulder Flexion   Right: 155/165             Left: 170         Pain Level (0-10 scale) post treatment: 0    ASSESSMENT/Changes in Function:   Pt continues to report aching and overuse of upper trap. Noted continued loss of serratus with push ups. Added plank activity with no difficulty. Slightly challenged with plank walk around. Noted clicking decreased with manual postioning into left abs with right serratus. May work in this position with overhead movement or Kinesio tape into left abs. Patient will continue to benefit from skilled PT services to modify and progress therapeutic interventions, address functional mobility deficits, address ROM deficits, address strength deficits, analyze and address soft tissue restrictions, analyze and cue movement patterns, analyze and modify body mechanics/ergonomics, assess and modify postural abnormalities, address imbalance/dizziness and instruct in home and community integration to attain remaining goals. [x]  See Plan of Care  []  See progress note/recertification  []  See Discharge Summary         Progress towards goals / Updated goals:  *Long Term Goals: LTG- To be accomplished in 8 week(s):  1.  Pt will report >/=80% improvement in symptoms to be able to complete ADLs with increased ease. Eval: 4/10 pain at worst by end of day.  Unable to put hair up and complete ADLs with ease  Last PN: 6/10 max pain in right UT, continued difficulty wearing bras however able to wear racer backs now with continued clicking with reaching overhead slow progress 10/22/21  Current:      2.  Pt bilateral shoulder strength will improve in all planes to 5/5 MMT to allow pt to return to working out. Eval: left UE all planes 5/5, unable to assess right UE due to protocol. Will assess when appropriate  Last PN:progressing 10/22/21  Shoulder flexion 4/5              Shoulder abduction 4/5              Shoulder ER/IR 4/5          Lower trap 4+/5, serratus and rhomboids 4/5      Current:      3. Pt right  strength will be equal to the left to allow pt to lift weights working out with increased ease. Eval: right 63#, left 80#  Last PN :85# right, left 80# goal MET 9/29/21     4.  Pt right shoulder AROM will improve to WFL to allow pt to shower and put hair up with increased ease. Eval:  PROM Right   Flexion 78*   ABD  58*   ER @ 90 Degrees @45* 24*   IR @ 90 Degrees @45*45*      Last PN: progressing 10/22/21  AROM Right   Flexion 180*   ABD  180*   ER @ 90 Degrees 90*   IR @ 90 Degrees 55* (80* post session)       Current:      5.  Pt FOTO score will increase by >/= 26 points to show improvement in subjective function. Eval: 43  Last PN:: 63 progressing 10/13/21  Current:      Updated Goals: to be achieved in 5 treatments:  1. Pt will be able to perform floor presses and other lifts without clicking and with good mechanics to allow pt to perform job related tasks.   Last PN:clicking still at times with presses's progressing 10/22/21   Current: able to complete 12 floor pressups without clicking however next set unable to complete 1 progressing 10/28/21     2.  Pt will report decreased Right UT sensitivity and be able to wear all clothing without restriction to improve quality of life.   Last PN still unable to wear bras due to pain and hypersensitivity progressing 10/22/21  Current:      **    PLAN  []  Upgrade activities as tolerated     [x]  Continue plan of care  []  Update interventions per flow sheet       []  Discharge due to:_  []  Other:_      Danish Sanchez, PORTIA 11/2/2021  9:27 AM    Future Appointments   Date Time Provider Juliette Ames 11/11/2021  3:00 PM Sierra [facility] [facility]   11/18/2021  2:15 PM Briana Roy PT, DPT [facility]   11/23/2021  2:15 PM Briana Roy PT, DPT [facility]

## 2021-11-11 ENCOUNTER — HOSPITAL ENCOUNTER (OUTPATIENT)
Dept: PHYSICAL THERAPY | Age: 41
Discharge: HOME | End: 2021-11-11
Payer: COMMERCIAL

## 2021-11-11 PROCEDURE — 97112 NEUROMUSCULAR REEDUCATION: CPT

## 2021-11-11 PROCEDURE — 97530 THERAPEUTIC ACTIVITIES: CPT

## 2021-11-11 PROCEDURE — 97110 THERAPEUTIC EXERCISES: CPT

## 2021-11-11 PROCEDURE — 97140 MANUAL THERAPY 1/> REGIONS: CPT

## 2021-11-11 NOTE — PROGRESS NOTES
PT DAILY TREATMENT NOTE    Patient Name: Korin Munoz  Date:2021  : 1980  [x]  Patient  Verified  Payor: Deane Frankel / Plan: Delisa Mckeon RPN / Product Type: Commerical /    In time:3:00  Out time:3:59  Total Treatment Time (min): 59  Total Timed Codes (min): 59  1:1 Treatment Time (MC/BCBS only): 59   Visit #: 30 of 33    Treatment Dx: Pain radiating to right shoulder [M25.511]    SUBJECTIVE  Pain Level (0-10 scale): 2  Any medication changes, allergies to medications, adverse drug reactions, diagnosis change, or new procedure performed?: [x] No    [] Yes (see summary sheet for update)  Subjective functional status/changes:   [] No changes reported  \"I did some wall balls today and it felt fine while I was doing it , but now I am feeling it in the front of the shoulder. \"     OBJECTIVE      15 min Therapeutic Exercise:  [] See flow sheet :   Rationale: increase ROM, increase strength, improve coordination, improve balance and increase proprioception to improve the patients ability to perform pain free ADl's     15 min Therapeutic Activity:  []  See flow sheet :   Rationale: increase ROM, increase strength, improve coordination, improve balance and increase proprioception  to improve the patients ability to perform pain free ADL's      19 min Neuromuscular Re-education:  []  See flow sheet :   Rationale: increase ROM, increase strength, improve coordination, improve balance and increase proprioception  to improve the patients ability to perform pain free ADL's     10 min Manual Therapy:  Cupping to thoracic parapsinals in safia pose  Right subclavius release  Rib pumping  Right pec stretch    Rationale: decrease pain, increase ROM, increase tissue extensibility, decrease edema , correct positional vertigo, decrease trigger points and increase postural awareness to improve the patients ability to perform pain free ADL\"s   The manual therapy interventions were performed at a separate and distinct time from the therapeutic activities interventions. With   [] TE   [] TA   [] neuro   [] other: Patient Education: [x] Review HEP    [] Progressed/Changed HEP based on:   [] positioning   [] body mechanics   [] transfers   [] heat/ice application    [] other:      Other Objective/Functional Measures:   MILAD Special Tests (pre/post)  HGIR:                                                 Right: 55/70             Left: 55/75  Shoulder Flexion   Right: 155             Left: 180  Hip Add Drop Test:                           Right: mid            Left:-    Increased clicking with lat hang stretch           Pain Level (0-10 scale) post treatment: 0    ASSESSMENT/Changes in Function:   Pt presented in session with reports of tension in upper trap after some home exercises however not bad. Pt reported she has been working on left hip lifts and obliques and was able to performed hanging pulls up under bar with out pain or clicking. Pt had a poor reaction to lat hang at bar with increased clicking after. Resolved with left hip lift. Overall pt is progressing well however slowly. Patient will continue to benefit from skilled PT services to modify and progress therapeutic interventions, address functional mobility deficits, address ROM deficits, address strength deficits, analyze and address soft tissue restrictions, analyze and cue movement patterns, analyze and modify body mechanics/ergonomics, assess and modify postural abnormalities, address imbalance/dizziness and instruct in home and community integration to attain remaining goals. [x]  See Plan of Care  []  See progress note/recertification  []  See Discharge Summary         Progress towards goals / Updated goals:    *Long Term Goals: LTG- To be accomplished in 8 week(s):  1.  Pt will report >/=80% improvement in symptoms to be able to complete ADLs with increased ease. Eval: 4/10 pain at worst by end of day.  Unable to put hair up and complete ADLs with ease  Last PN: 6/10 max pain in right UT, continued difficulty wearing bras however able to wear racer backs now with continued clicking with reaching overhead slow progress 10/22/21  Current: noted pt is unable to perform lat hang. Good performance of landmine activity and hip lifts. : progressing 11/11/21     2.  Pt bilateral shoulder strength will improve in all planes to 5/5 MMT to allow pt to return to working out. Eval: left UE all planes 5/5, unable to assess right UE due to protocol. Will assess when appropriate  Last PN:progressing 10/22/21  Shoulder flexion 4/5              Shoulder abduction 4/5              Shoulder ER/IR 4/5          Lower trap 4+/5, serratus and rhomboids 4/5      Current:      3. Pt right  strength will be equal to the left to allow pt to lift weights working out with increased ease. Eval: right 63#, left 80#  Last PN :85# right, left 80# goal MET 9/29/21     4.  Pt right shoulder AROM will improve to WFL to allow pt to shower and put hair up with increased ease. Eval:  PROM Right   Flexion 78*   ABD  58*   ER @ 90 Degrees @45* 24*   IR @ 90 Degrees @45*45*      Last PN: progressing 10/22/21  AROM Right   Flexion 180*   ABD  180*   ER @ 90 Degrees 90*   IR @ 90 Degrees 55* (80* post session)       Current:      5.  Pt FOTO score will increase by >/= 26 points to show improvement in subjective function. Eval: 43  Last PN:: 63 progressing 10/13/21  Current:      Updated Goals: to be achieved in 5 treatments:  1. Pt will be able to perform floor presses and other lifts without clicking and with good mechanics to allow pt to perform job related tasks.   Last PN:clicking still at times with presses's progressing 10/22/21   Current: able to complete 12 floor pressups without clicking however next set unable to complete 1 progressing 10/28/21     2.  Pt will report decreased Right UT sensitivity and be able to wear all clothing without restriction to improve quality of life.   Last PN still unable to wear bras due to pain and hypersensitivity progressing 10/22/21  Current:       PLAN  []  Upgrade activities as tolerated     [x]  Continue plan of care  []  Update interventions per flow sheet       []  Discharge due to:_  []  Other:_      Lisa Smith, PTA 11/11/2021  1:20 PM    Future Appointments   Date Time Provider Juliette Ames   11/11/2021  3:00 PM Camilo Rivera [facility]   11/18/2021  2:15 PM Temitope Samano, PT, DPT [facility]   11/23/2021  2:15 PM Temitope Samano PT, DPT [facility]

## 2021-11-14 ENCOUNTER — APPOINTMENT (OUTPATIENT)
Dept: GENERAL RADIOLOGY | Age: 41
End: 2021-11-14
Attending: EMERGENCY MEDICINE
Payer: COMMERCIAL

## 2021-11-14 ENCOUNTER — HOSPITAL ENCOUNTER (EMERGENCY)
Age: 41
Discharge: HOME | End: 2021-11-15
Attending: EMERGENCY MEDICINE
Payer: COMMERCIAL

## 2021-11-14 VITALS
DIASTOLIC BLOOD PRESSURE: 72 MMHG | BODY MASS INDEX: 24.8 KG/M2 | TEMPERATURE: 97.8 F | HEART RATE: 66 BPM | WEIGHT: 140 LBS | HEIGHT: 63 IN | RESPIRATION RATE: 18 BRPM | SYSTOLIC BLOOD PRESSURE: 140 MMHG | OXYGEN SATURATION: 99 %

## 2021-11-14 DIAGNOSIS — R10.13 ABDOMINAL PAIN, EPIGASTRIC: Primary | ICD-10-CM

## 2021-11-14 DIAGNOSIS — K58.9 IRRITABLE BOWEL SYNDROME WITHOUT DIARRHEA: ICD-10-CM

## 2021-11-14 DIAGNOSIS — K22.719 BARRETT'S ESOPHAGUS WITH DYSPLASIA: ICD-10-CM

## 2021-11-14 LAB
ALBUMIN SERPL-MCNC: 3.9 G/DL (ref 3.4–5)
ALBUMIN/GLOB SERPL: 1.3 {RATIO} (ref 0.8–1.7)
ALP SERPL-CCNC: 94 U/L (ref 45–117)
ALT SERPL-CCNC: 42 U/L (ref 13–56)
ANION GAP SERPL CALC-SCNC: 5 MMOL/L (ref 3–18)
APPEARANCE UR: CLEAR
AST SERPL-CCNC: 30 U/L (ref 10–38)
BASOPHILS # BLD: 0.1 K/UL (ref 0–0.1)
BASOPHILS NFR BLD: 1 % (ref 0–2)
BILIRUB SERPL-MCNC: 0.3 MG/DL (ref 0.2–1)
BILIRUB UR QL: NEGATIVE
BUN SERPL-MCNC: 21 MG/DL (ref 7–18)
BUN/CREAT SERPL: 19 (ref 12–20)
CALCIUM SERPL-MCNC: 9.1 MG/DL (ref 8.5–10.1)
CHLORIDE SERPL-SCNC: 99 MMOL/L (ref 100–111)
CO2 SERPL-SCNC: 30 MMOL/L (ref 21–32)
COLOR UR: YELLOW
CREAT SERPL-MCNC: 1.13 MG/DL (ref 0.6–1.3)
DIFFERENTIAL METHOD BLD: ABNORMAL
EOSINOPHIL # BLD: 1.1 K/UL (ref 0–0.4)
EOSINOPHIL NFR BLD: 14 % (ref 0–5)
ERYTHROCYTE [DISTWIDTH] IN BLOOD BY AUTOMATED COUNT: 14.4 % (ref 11.6–14.5)
GLOBULIN SER CALC-MCNC: 3 G/DL (ref 2–4)
GLUCOSE SERPL-MCNC: 83 MG/DL (ref 74–99)
GLUCOSE UR STRIP.AUTO-MCNC: NEGATIVE MG/DL
HCG UR QL: NEGATIVE
HCT VFR BLD AUTO: 39.9 % (ref 35–45)
HGB BLD-MCNC: 13.1 G/DL (ref 12–16)
HGB UR QL STRIP: NEGATIVE
IMM GRANULOCYTES # BLD AUTO: 0 K/UL (ref 0–0.04)
IMM GRANULOCYTES NFR BLD AUTO: 0 % (ref 0–0.5)
KETONES UR QL STRIP.AUTO: NEGATIVE MG/DL
LEUKOCYTE ESTERASE UR QL STRIP.AUTO: NEGATIVE
LIPASE SERPL-CCNC: 199 U/L (ref 73–393)
LYMPHOCYTES # BLD: 1.5 K/UL (ref 0.9–3.6)
LYMPHOCYTES NFR BLD: 20 % (ref 21–52)
MAGNESIUM SERPL-MCNC: 2.3 MG/DL (ref 1.6–2.6)
MCH RBC QN AUTO: 29 PG (ref 24–34)
MCHC RBC AUTO-ENTMCNC: 32.8 G/DL (ref 31–37)
MCV RBC AUTO: 88.3 FL (ref 78–100)
MONOCYTES # BLD: 0.6 K/UL (ref 0.05–1.2)
MONOCYTES NFR BLD: 8 % (ref 3–10)
NEUTS SEG # BLD: 4.4 K/UL (ref 1.8–8)
NEUTS SEG NFR BLD: 57 % (ref 40–73)
NITRITE UR QL STRIP.AUTO: NEGATIVE
NRBC # BLD: 0 K/UL (ref 0–0.01)
NRBC BLD-RTO: 0 PER 100 WBC
PH UR STRIP: 6 [PH] (ref 5–8)
PLATELET # BLD AUTO: 396 K/UL (ref 135–420)
PMV BLD AUTO: 9.6 FL (ref 9.2–11.8)
POTASSIUM SERPL-SCNC: 3.6 MMOL/L (ref 3.5–5.5)
PROT SERPL-MCNC: 6.9 G/DL (ref 6.4–8.2)
PROT UR STRIP-MCNC: NEGATIVE MG/DL
RBC # BLD AUTO: 4.52 M/UL (ref 4.2–5.3)
RBC MORPH BLD: ABNORMAL
SODIUM SERPL-SCNC: 134 MMOL/L (ref 136–145)
SP GR UR REFRACTOMETRY: 1.01 (ref 1–1.03)
UROBILINOGEN UR QL STRIP.AUTO: 0.2 EU/DL (ref 0.2–1)
WBC # BLD AUTO: 7.7 K/UL (ref 4.6–13.2)

## 2021-11-14 PROCEDURE — 81003 URINALYSIS AUTO W/O SCOPE: CPT

## 2021-11-14 PROCEDURE — 99283 EMERGENCY DEPT VISIT LOW MDM: CPT

## 2021-11-14 PROCEDURE — 85025 COMPLETE CBC W/AUTO DIFF WBC: CPT

## 2021-11-14 PROCEDURE — 74011250636 HC RX REV CODE- 250/636: Performed by: EMERGENCY MEDICINE

## 2021-11-14 PROCEDURE — 86677 HELICOBACTER PYLORI ANTIBODY: CPT

## 2021-11-14 PROCEDURE — 83735 ASSAY OF MAGNESIUM: CPT

## 2021-11-14 PROCEDURE — 83690 ASSAY OF LIPASE: CPT

## 2021-11-14 PROCEDURE — 74011250637 HC RX REV CODE- 250/637: Performed by: EMERGENCY MEDICINE

## 2021-11-14 PROCEDURE — 74019 RADEX ABDOMEN 2 VIEWS: CPT

## 2021-11-14 PROCEDURE — 96374 THER/PROPH/DIAG INJ IV PUSH: CPT

## 2021-11-14 PROCEDURE — 81025 URINE PREGNANCY TEST: CPT

## 2021-11-14 PROCEDURE — 80053 COMPREHEN METABOLIC PANEL: CPT

## 2021-11-14 RX ORDER — MAGNESIUM CITRATE
296 SOLUTION, ORAL ORAL
Status: COMPLETED | OUTPATIENT
Start: 2021-11-14 | End: 2021-11-15

## 2021-11-14 RX ORDER — DICYCLOMINE HYDROCHLORIDE 10 MG/1
20 CAPSULE ORAL
Status: COMPLETED | OUTPATIENT
Start: 2021-11-14 | End: 2021-11-14

## 2021-11-14 RX ORDER — KETOROLAC TROMETHAMINE 30 MG/ML
30 INJECTION, SOLUTION INTRAMUSCULAR; INTRAVENOUS
Status: COMPLETED | OUTPATIENT
Start: 2021-11-14 | End: 2021-11-14

## 2021-11-14 RX ADMIN — KETOROLAC TROMETHAMINE 30 MG: 30 INJECTION, SOLUTION INTRAMUSCULAR at 22:26

## 2021-11-14 RX ADMIN — DICYCLOMINE HYDROCHLORIDE 20 MG: 10 CAPSULE ORAL at 22:26

## 2021-11-15 PROCEDURE — 74011250637 HC RX REV CODE- 250/637: Performed by: EMERGENCY MEDICINE

## 2021-11-15 RX ADMIN — MAGNESIUM CITRATE 296 ML: 1.75 LIQUID ORAL at 00:11

## 2021-11-15 NOTE — ED TRIAGE NOTES
Patient arrives ambulatory to ED with c/c of sharp stabbing abdominal pains with nausea that began around 530 this evening. Patient states she does suffer from IBS but has never had this pain. She is complaining that the pain comes and goes and she is very bloated and has never had this before. She reports she had a headache earlier today that went away and then the stomach pains started. She says she does eat very healthy and when she started to have these symptoms she took 3 activated charcoal pills with no relief.

## 2021-11-15 NOTE — ED PROVIDER NOTES
EMERGENCY DEPARTMENT HISTORY AND PHYSICAL EXAM    Date: 11/14/2021  Patient Name: Addi Vega    History of Presenting Illness     Chief Complaint   Patient presents with    Abdominal Pain         History Provided By: Patient    Additional History (Context):   9:35 PM   Addi Vega is a 35 y.o. female with PMHX of ADHD, Velásquez's esophagus, depressive disorder who presents to the emergency department C/O abdominal pain. Patient states she is complaining of abdominal pain that started later this evening. She grades it as a sharp stabbing and aching and cramping sensation periumbilical in the epigastric region. She gets nauseous but has not had any diarrhea in fact more constipated. 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 She does use activated charcoal to treat some of her symptoms. She does show gaseous distention bloating. She has no urinary pain discomfort blood per rectum. She has no chest pain shortness of breath fevers or chills. Social History  Denies smoking drinking or drugs    Family History  Negative family history of colon cancers or Crohn's disease      PCP: Anna Clay MD    Current Facility-Administered Medications   Medication Dose Route Frequency Provider Last Rate Last Admin    magnesium citrate solution 296 mL  296 mL Oral NOW Nasra Gonzalez MD         Current Outpatient Medications   Medication Sig Dispense Refill    RABEprazole (Aciphex) 20 mg TbEC Take 20 mg by mouth daily.  testosterone (ANDROGEL) 1 % (25 mg/2.5gram) glpk 2.5 g by TransDERmal route every other day.  LORazepam (Ativan) 1 mg tablet Take 1 mg by mouth every evening.  magnesium 250 mg tab Take  by mouth.  melatonin 1 mg tablet Take  by mouth.       zinc sulfate (ZINC-220 PO) Take  by mouth.  ascorbic acid, vitamin C, (Vitamin C) 250 mg tablet Take  by mouth.  buPROPion SR (WELLBUTRIN SR) 200 mg SR tablet Take 300 mg by mouth two (2) times a day.  LEVOMEFOLATE CALCIUM (DEPLIN PO) Take 15 mg by mouth nightly.  cholecalciferol (VITAMIN D3) 1,000 unit cap Take 5,000 Units by mouth daily.  lisdexamfetamine (VYVANSE) 30 mg capsule Take 30 mg by mouth every morningIndications: Attention-Deficit Hyperactivity Disorder. Past History     Past Medical History:  Past Medical History:   Diagnosis Date    Velásquez's esophagus     Duke-Danlos syndrome     GERD (gastroesophageal reflux disease)     Barretts Esophagus    Hiatal hernia     Ill-defined condition     IBSC    Nausea & vomiting     Psychiatric disorder     depression       Past Surgical History:  Past Surgical History:   Procedure Laterality Date    HX ADENOIDECTOMY      HX APPENDECTOMY      HX BREAST AUGMENTATION  2008    HX ORTHOPAEDIC  1994    right shoulder-labrum repair    HX ORTHOPAEDIC Right 2021    CTR     HX TONSILLECTOMY      NEUROLOGICAL PROCEDURE UNLISTED  2018    spinal fusion       Family History:  History reviewed. No pertinent family history. Social History:  Social History     Tobacco Use    Smoking status: Never Smoker    Smokeless tobacco: Never Used   Vaping Use    Vaping Use: Never used   Substance Use Topics    Alcohol use: No    Drug use: Yes     Comment: CBD with melatonin-stopped 2 weeks prior to DOS. Allergies:   Allergies   Allergen Reactions    Dilaudid [Hydromorphone] Nausea and Vomiting     Constant vomiting    Amoxicillin Hives    Carrot Other (comments)     Allergy testing    Gluten Other (comments)    Lactose Other (comments)     GI discomfort    Pineapple Other (comments)     Allergy testing    Pork/Porcine Containing Products Other (comments)     Allergy testing    Scallops Other (comments)     Per allergy testing    Sweet Potato Other (comments)     Allergy testing         Review of Systems   Review of Systems   Constitutional: Negative. HENT: Negative. Eyes: Negative. Respiratory: Negative. Cardiovascular: Negative. Gastrointestinal: Positive for abdominal distention, abdominal pain, constipation and nausea. Negative for anal bleeding and blood in stool. Endocrine: Negative. Genitourinary: Negative. Musculoskeletal: Negative. Skin: Negative. Allergic/Immunologic: Negative. Neurological: Negative. Hematological: Negative. Psychiatric/Behavioral: Positive for decreased concentration. All other systems reviewed and are negative. Physical Exam     Vitals:    11/14/21 2008 11/14/21 2336   BP: (!) 140/72    Pulse: 66    Resp: 18    Temp: 97.8 °F (36.6 °C)    SpO2: 100% 99%   Weight: 63.5 kg (140 lb)    Height: 5' 3\" (1.6 m)      Physical Exam  Vitals and nursing note reviewed. Constitutional:       General: She is not in acute distress. Appearance: She is well-developed. She is not diaphoretic. HENT:      Head: Normocephalic and atraumatic. Eyes:      General: No scleral icterus. Extraocular Movements:      Right eye: Normal extraocular motion. Left eye: Normal extraocular motion. Conjunctiva/sclera: Conjunctivae normal.      Pupils: Pupils are equal, round, and reactive to light. Neck:      Trachea: No tracheal deviation. Cardiovascular:      Rate and Rhythm: Normal rate and regular rhythm. Heart sounds: Normal heart sounds. Pulmonary:      Effort: Pulmonary effort is normal. No respiratory distress. Breath sounds: Normal breath sounds. No stridor. Abdominal:      General: Bowel sounds are normal. There is no distension. Palpations: Abdomen is soft. Tenderness: There is abdominal tenderness in the epigastric area. There is no rebound. Comments: Voluntary guarding but absent when distracted.   Bowel sounds are normal.   Musculoskeletal: General: No tenderness. Normal range of motion. Cervical back: Normal range of motion and neck supple. Comments: Grossly unremarkable without abnormalities   Skin:     General: Skin is warm and dry. Capillary Refill: Capillary refill takes less than 2 seconds. Findings: No erythema or rash. Neurological:      Mental Status: She is alert and oriented to person, place, and time. Cranial Nerves: No cranial nerve deficit. Motor: No weakness. Psychiatric:         Mood and Affect: Mood normal.         Behavior: Behavior normal.         Thought Content: Thought content normal.         Judgment: Judgment normal.       Diagnostic Study Results     Labs -  Recent Results (from the past 24 hour(s))   URINALYSIS W/ RFLX MICROSCOPIC    Collection Time: 11/14/21  8:50 PM   Result Value Ref Range    Color YELLOW      Appearance CLEAR      Specific gravity 1.007 1.005 - 1.030      pH (UA) 6.0 5.0 - 8.0      Protein Negative NEG mg/dL    Glucose Negative NEG mg/dL    Ketone Negative NEG mg/dL    Bilirubin Negative NEG      Blood Negative NEG      Urobilinogen 0.2 0.2 - 1.0 EU/dL    Nitrites Negative NEG      Leukocyte Esterase Negative NEG     HCG URINE, QL    Collection Time: 11/14/21  8:50 PM   Result Value Ref Range    HCG urine, QL Negative NEG     CBC WITH AUTOMATED DIFF    Collection Time: 11/14/21  9:00 PM   Result Value Ref Range    WBC 7.7 4.6 - 13.2 K/uL    RBC 4.52 4.20 - 5.30 M/uL    HGB 13.1 12.0 - 16.0 g/dL    HCT 39.9 35.0 - 45.0 %    MCV 88.3 78.0 - 100.0 FL    MCH 29.0 24.0 - 34.0 PG    MCHC 32.8 31.0 - 37.0 g/dL    RDW 14.4 11.6 - 14.5 %    PLATELET 723 174 - 944 K/uL    MPV 9.6 9.2 - 11.8 FL    NRBC 0.0 0  WBC    ABSOLUTE NRBC 0.00 0.00 - 0.01 K/uL    NEUTROPHILS 57 40 - 73 %    LYMPHOCYTES 20 (L) 21 - 52 %    MONOCYTES 8 3 - 10 %    EOSINOPHILS 14 (H) 0 - 5 %    BASOPHILS 1 0 - 2 %    IMMATURE GRANULOCYTES 0 0.0 - 0.5 %    ABS. NEUTROPHILS 4.4 1.8 - 8.0 K/UL    ABS. LYMPHOCYTES 1.5 0.9 - 3.6 K/UL    ABS. MONOCYTES 0.6 0.05 - 1.2 K/UL    ABS. EOSINOPHILS 1.1 (H) 0.0 - 0.4 K/UL    ABS. BASOPHILS 0.1 0.0 - 0.1 K/UL    ABS. IMM. GRANS. 0.0 0.00 - 0.04 K/UL    DF MANUAL      RBC COMMENTS NORMOCYTIC, NORMOCHROMIC     METABOLIC PANEL, COMPREHENSIVE    Collection Time: 11/14/21  9:00 PM   Result Value Ref Range    Sodium 134 (L) 136 - 145 mmol/L    Potassium 3.6 3.5 - 5.5 mmol/L    Chloride 99 (L) 100 - 111 mmol/L    CO2 30 21 - 32 mmol/L    Anion gap 5 3.0 - 18 mmol/L    Glucose 83 74 - 99 mg/dL    BUN 21 (H) 7.0 - 18 MG/DL    Creatinine 1.13 0.6 - 1.3 MG/DL    BUN/Creatinine ratio 19 12 - 20      GFR est AA >60 >60 ml/min/1.73m2    GFR est non-AA 53 (L) >60 ml/min/1.73m2    Calcium 9.1 8.5 - 10.1 MG/DL    Bilirubin, total 0.3 0.2 - 1.0 MG/DL    ALT (SGPT) 42 13 - 56 U/L    AST (SGOT) 30 10 - 38 U/L    Alk. phosphatase 94 45 - 117 U/L    Protein, total 6.9 6.4 - 8.2 g/dL    Albumin 3.9 3.4 - 5.0 g/dL    Globulin 3.0 2.0 - 4.0 g/dL    A-G Ratio 1.3 0.8 - 1.7     LIPASE    Collection Time: 11/14/21  9:00 PM   Result Value Ref Range    Lipase 199 73 - 393 U/L   MAGNESIUM    Collection Time: 11/14/21  9:00 PM   Result Value Ref Range    Magnesium 2.3 1.6 - 2.6 mg/dL        Radiologic Studies -   XR ABD FLAT/ ERECT   Final Result      1. Moderate to large colonic stool burden. CT Results  (Last 48 hours)    None        CXR Results  (Last 48 hours)    None          Medications given in the ED-  Medications   magnesium citrate solution 296 mL (has no administration in time range)   dicyclomine (BENTYL) capsule 20 mg (20 mg Oral Given 11/14/21 2226)   ketorolac (TORADOL) injection 30 mg (30 mg IntraVENous Given 11/14/21 2226)         Medical Decision Making   I am the first provider for this patient. I reviewed the vital signs, available nursing notes, past medical history, past surgical history, family history and social history.     Vital Signs-Reviewed the patient's vital signs.    Pulse Oximetry Analysis -99% on room air    Cardiac Monitor:  Rate: 69 bpm  Rhythm: Sinus rhythm    Records Reviewed: NURSING NOTES AND PREVIOUS MEDICAL RECORDS    Provider Notes (Medical Decision Making):   Patient with symptoms of epigastric pain bloating gassy as well as constipation associated with severe cramping and abdominal pain. This more likely associated with IBS gastritis peptic ulcer disease as opposed to Crohn's ulcerative colitis biliary tract infection or obstruction appendicitis. She has no symptoms to suggest GYN pathology pregnancy related issues were vascular lesions. She responded well to medications we will give her medications similar to this for follow-up. We discussed the importance of carefully monitoring Velásquze's was does not degrade into esophageal malignancy. She understands this will follow up with GI doctor for regular treatment and endoscopy. Procedures:  Procedures    ED Course:   9:35 PM: Initial assessment performed. The patients presenting problems have been discussed, and they are in agreement with the care plan formulated and outlined with them. I have encouraged them to ask questions as they arise throughout their visit. Diagnosis and Disposition       DISCHARGE NOTE:  12:05 AM  Tonyhoracio Cervantes's  results have been reviewed with her. She has been counseled regarding her diagnosis, treatment, and plan. She verbally conveys understanding and agreement of the signs, symptoms, diagnosis, treatment and prognosis and additionally agrees to follow up as discussed. She also agrees with the care-plan and conveys that all of her questions have been answered. I have also provided discharge instructions for her that include: educational information regarding their diagnosis and treatment, and list of reasons why they would want to return to the ED prior to their follow-up appointment, should her condition change.  She has been provided with education for proper emergency department utilization. CLINICAL IMPRESSION:    1. Abdominal pain, epigastric    2. Irritable bowel syndrome without diarrhea    3. Velásquez's esophagus with dysplasia        PLAN:  1. D/C Home  2. Current Discharge Medication List        3. Follow-up Information     Follow up With Specialties Details Why Contact Info    Alma Elise MD Family Medicine  As needed [address] [place] [address]      Cameron Zurita MD Gastroenterology   [address] [address]  [phone]          _______________________________    This note was partially transcribed via voice recognition software. Although efforts have been made to catch any discrepancies, it may contain sound alike words, grammatical errors, or nonsensical words.

## 2021-11-16 LAB
H PYLORI IGA SER-ACNC: <9 UNITS (ref 0–8.9)
H PYLORI IGG SER IA-ACNC: 0.28 INDEX VALUE (ref 0–0.79)

## 2021-11-16 RX ORDER — DICYCLOMINE HYDROCHLORIDE 10 MG/1
20 CAPSULE ORAL
Qty: 60 CAPSULE | Refills: 0 | Status: SHIPPED | OUTPATIENT
Start: 2021-11-16

## 2021-11-18 ENCOUNTER — HOSPITAL ENCOUNTER (OUTPATIENT)
Dept: PHYSICAL THERAPY | Age: 41
Discharge: HOME | End: 2021-11-18
Payer: COMMERCIAL

## 2021-11-18 PROCEDURE — 97140 MANUAL THERAPY 1/> REGIONS: CPT

## 2021-11-18 PROCEDURE — 97112 NEUROMUSCULAR REEDUCATION: CPT

## 2021-11-18 PROCEDURE — 97110 THERAPEUTIC EXERCISES: CPT

## 2021-11-18 NOTE — PROGRESS NOTES
PT DAILY TREATMENT NOTE    Patient Name: Adela Barry  Date:2021  : 1980  [x]  Patient  Verified  Payor: Leta Cuevas / Plan: Jordy ADHIKARI / Product Type: Commerical /    In time:11:40  Out time:12:29  Total Treatment Time (min): 49  Total Timed Codes (min): 49  1:1 Treatment Time (MC/BCBS only): 49   Visit #: 31 of 33    Treatment Dx: Pain radiating to right shoulder [M25.511]    SUBJECTIVE  Pain Level (0-10 scale): 2  Any medication changes, allergies to medications, adverse drug reactions, diagnosis change, or new procedure performed?: [x] No    [] Yes (see summary sheet for update)  Subjective functional status/changes:   [] No changes reported  \"I went to [place] this weekend and my neck has been hurting since. \"    OBJECTIVE      16 min Therapeutic Exercise:  [x] See flow sheet :   Rationale: increase ROM and increase strength to improve the patients ability to perform daily activities with decreased pain and symptom levels    25 min Neuromuscular Re-education:  [x]  See flow sheet :   Rationale: increase strength, improve coordination and increase proprioception  to improve the patients ability to perform daily activities with decreased pain and symptom levels    8 min Manual Therapy:  Rib pumping with emphasis on rotation to left, infraclavicular rib pump, gentle cervical distraction    Rationale: decrease pain, increase ROM and increase tissue extensibility to improve the patients ability to perform daily activities with decreased pain and symptom levels  The manual therapy interventions were performed at a separate and distinct time from the therapeutic activities interventions. With   [] TE   [] TA   [] neuro   [] other: Patient Education: [x] Review HEP    [] Progressed/Changed HEP based on:   [] positioning   [] body mechanics   [] transfers   [] heat/ice application    [] other:      Other Objective/Functional Measures:   HGIR post 79* bilaterally     Unable to maintain SA punch on right with left ab pull down    Pain Level (0-10 scale) post treatment: 0    ASSESSMENT/Changes in Function: Pt presented to therapy with increased right UT pain after driving to [place] this weekend however able to decrease post session. Pt reporting being admitted to hospital earlier this week due to GI issues however better. Pt continues to demonstrate left ab and right serratus weakness with inability to maintain scapular protraction with resistance at [place]ila while complete left ab pulldown. Patient will continue to benefit from skilled PT services to modify and progress therapeutic interventions, address functional mobility deficits, address ROM deficits, address strength deficits, analyze and address soft tissue restrictions, analyze and cue movement patterns, analyze and modify body mechanics/ergonomics, assess and modify postural abnormalities and instruct in home and community integration to attain remaining goals. [x]  See Plan of Care  []  See progress note/recertification  []  See Discharge Summary         Progress towards goals / Updated goals:  Long Term Goals: LTG- To be accomplished in 8 week(s):  1.  Pt will report >/=80% improvement in symptoms to be able to complete ADLs with increased ease. Eval: 4/10 pain at worst by end of day. Unable to put hair up and complete ADLs with ease  Last PN: 6/10 max pain in right UT, continued difficulty wearing bras however able to wear racer backs now with continued clicking with reaching overhead slow progress 10/22/21  Current: noted pt is unable to perform lat hang. Good performance of landmine activity and hip lifts. : progressing 11/11/21     2.  Pt bilateral shoulder strength will improve in all planes to 5/5 MMT to allow pt to return to working out. Eval: left UE all planes 5/5, unable to assess right UE due to protocol.  Will assess when appropriate  Last PN:progressing 10/22/21  Shoulder flexion 4/5              Shoulder abduction 4/5              Shoulder ER/IR 4/5          Lower trap 4+/5, serratus and rhomboids 4/5      Current: very challenged with right SA punch with left ab pulldown progressing 11/18/21     3. Pt right  strength will be equal to the left to allow pt to lift weights working out with increased ease. Eval: right 63#, left 80#  Last PN :85# right, left 80# goal MET 9/29/21     4.  Pt right shoulder AROM will improve to WFL to allow pt to shower and put hair up with increased ease. Eval:  PROM Right   Flexion 78*   ABD  58*   ER @ 90 Degrees @45* 24*   IR @ 90 Degrees @45*45*      Last PN: progressing 10/22/21  AROM Right   Flexion 180*   ABD  180*   ER @ 90 Degrees 90*   IR @ 90 Degrees 55* (80* post session)       Current: 70* HGIR bilaterally post session progressing 11/18/21     5.  Pt FOTO score will increase by >/= 26 points to show improvement in subjective function. Eval: 43  Last PN:: 63 progressing 10/13/21  Current:      Updated Goals: to be achieved in 5 treatments:  1. Pt will be able to perform floor presses and other lifts without clicking and with good mechanics to allow pt to perform job related tasks.   Last PN:clicking still at times with presses's progressing 10/22/21   Current: able to complete 12 floor pressups without clicking however next set unable to complete 1 progressing 10/28/21     2.  Pt will report decreased Right UT sensitivity and be able to wear all clothing without restriction to improve quality of life.   Last PN still unable to wear bras due to pain and hypersensitivity progressing 10/22/21  Current:         PLAN  []  Upgrade activities as tolerated     [x]  Continue plan of care  []  Update interventions per flow sheet       []  Discharge due to:_  []  Other:_      Lachelle Mota 11/18/2021  9:49 AM    Future Appointments   Date Time Provider [place]   11/18/2021 11:30 AM Lachelle Mota [facility]   11/23/2021  2:15 PM Suzy Jaquez, PT, DPT [facility]

## 2021-11-23 ENCOUNTER — HOSPITAL ENCOUNTER (OUTPATIENT)
Dept: PHYSICAL THERAPY | Age: 41
Discharge: HOME | End: 2021-11-23
Payer: COMMERCIAL

## 2021-11-23 PROCEDURE — 97112 NEUROMUSCULAR REEDUCATION: CPT

## 2021-11-23 PROCEDURE — 97110 THERAPEUTIC EXERCISES: CPT

## 2021-11-23 PROCEDURE — 97140 MANUAL THERAPY 1/> REGIONS: CPT

## 2021-11-23 NOTE — PROGRESS NOTES
[facility] at the [address], [place], [address] [facility]  Phone: [phone]      Fax:  [phone]    Progress Note  Patient name: Adela Barry Start of Care: 4/15/21   Referral source: Richard Evans MD : 1980   Medical/Treatment Diagnosis: Pain radiating to right shoulder [M25.511] Onset Date:DOS 3/25/21     Prior Hospitalization: see medical history Provider#: [id number]   Medications: Verified on Patient Summary List    Comorbidities: Bankart repair when 12yo, L4/L5 spinal fusion 10/2017, Right carpal and cubital tunnel release DOS 21, EDS, depression   Prior Level of Function: working out, crossfit, complete ADLs with ease and no pain     Visits from Start of Care: 32    Missed Visits: 1    Progress Towards Goals:   [address]- To be accomplished in 8 week(s):  1.  Pt will report >/=80% improvement in symptoms to be able to complete ADLs with increased ease. Eval: 4/10 pain at worst by end of day. Unable to put hair up and complete ADLs with ease  Last PN: 6/10 max pain in right UT, continued difficulty wearing bras however able to wear racer backs now with continued clicking with reaching overhead slow progress 10/22/21  Current: 6-7/10 max pain with wearing bra, reaching overhead, floor presses progressing 21     2.  Pt bilateral shoulder strength will improve in all planes to 5/5 MMT to allow pt to return to working out. Eval: left UE all planes 5/5, unable to assess right UE due to protocol. Will assess when appropriate  Last PN:progressing 10/22/21  Shoulder flexion 4/5              Shoulder abduction 4/5              Shoulder ER/IR 4/5          Lower trap 4+/5, serratus and rhomboids 4/5      Current: 5/5 all planes except lower trap and serratus 4/5 progressing 21     3. Pt right  strength will be equal to the left to allow pt to lift weights working out with increased ease.   Eval: right 63#, left 80#  Last PN :85# right, left 80# goal MET 9/29/21     4.  Pt right shoulder AROM will improve to WFL to allow pt to shower and put hair up with increased ease. Eval:  PROM Right   Flexion 78*   ABD  58*   ER @ 90 Degrees @45* 24*   IR @ 90 Degrees @45*45*      Last PN: progressing 10/22/21  AROM Right   Flexion 180*   ABD  180*   ER @ 90 Degrees 90*   IR @ 90 Degrees 55* (80* post session)       Current: goal MET except IR 11/23/21  AROM Right   Flexion 167*   ABD  180*   ER @ 90 Degrees 90*   IR @ 90 Degrees 45* pre (75* post)         5.  Pt FOTO score will increase by >/= 26 points to show improvement in subjective function. Eval: 43  Last PN: 63 progressing 10/13/21  Current: 68 almost MET 11/23/21     Updated Goals: to be achieved in 5 treatments:  1. Pt will be able to perform floor presses and other lifts without clicking and with good mechanics to allow pt to perform job related tasks.   Last PN:clicking still at times with presses's progressing 79/81/27   Current: no clicking with lower trap facilitation progressing 11/23/21     2. Pt will report decreased Right UT sensitivity and be able to wear all clothing without restriction to improve quality of life.   Last PN still unable to wear bras due to pain and hypersensitivity progressing 10/22/21  Current: still unable to wear bra slow progress 11/23/21    Key Functional Changes: Pt has attended 32 sessions including initial evaluation S/P right Shoulder arthroscopic subacromial decompression, distal clavical excision, RCR, and longhead biceps tenodesis DOS 3/25/21. Pt continues to be very compliant with HEP however still c/o \"clicking\" at right clavicle with overhead movement. Pt able to decrease \"clicking\" with left oblique and lower trap facilitation to decrease right scapular elevation and inferior tipping with ability to complete lat pull and floor press today without pain or clicking with proper form.    Right shoulder ROM is WFL except IR however improves post session with exercises manual techniques. Right shoulder strength is improving per MMT however most limited in lower trap and serratus still. Pt continues to c/o hypersensitively at right UT as well with not able to wear bras at times because of it. 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      Updated Goals: to be achieved in 6 treatments:     ASSESSMENT/RECOMMENDATIONS:  [x]Continue therapy per initial plan/protocol at a frequency of  1 x per week for 6 weeks  []Continue therapy with the following recommended changes:_____________________      _____________________________________________________________________  []Discontinue therapy progressing towards or have reached established goals  []Discontinue therapy due to lack of appreciable progress towards goals  []Discontinue therapy due to lack of attendance or compliance  []Await Physician's recommendations/decisions regarding therapy  []Other:________________________________________________________________    Thank you for this referral.   Corrie Hudson Remesi 11/23/2021 6:50 PM

## 2021-11-23 NOTE — PROGRESS NOTES
PT DAILY TREATMENT NOTE    Patient Name: Emelyn Situ  Date:2021  : 1980  [x]  Patient  Verified  Payor: Vitaliy Juant / Plan: David Bowling RPN / Product Type: Commerical /    In time:2:20  Out time:3:26  Total Treatment Time (min): 66  Total Timed Codes (min): 66  1:1 Treatment Time (MC/BCBS only): 77   Visit #: 32 of 33    Treatment Dx: Pain radiating to right shoulder [M25.511]    SUBJECTIVE  Pain Level (0-10 scale): 0  Any medication changes, allergies to medications, adverse drug reactions, diagnosis change, or new procedure performed?: [x] No    [] Yes (see summary sheet for update)  Subjective functional status/changes:   [] No changes reported  \"No pain today. Still can;t wear a bra. \"    OBJECTIVE     22 min Therapeutic Exercise:  [x] See flow sheet :   Rationale: increase ROM and increase strength to improve the patients ability to perform daily activities with decreased pain and symptom levels    36 min Neuromuscular Re-education:  [x]  See flow sheet :   Rationale: increase strength, improve coordination and increase proprioception  to improve the patients ability to perform daily activities with decreased pain and symptom levels    8 min Manual Therapy:  Rib pumping with emphasis on rotation to left, infraclavicular rib pump, gentle cervical distraction    Rationale: decrease pain, increase ROM and increase tissue extensibility to improve the patients ability to perform daily activities with decreased pain and symptom levels  The manual therapy interventions were performed at a separate and distinct time from the therapeutic activities interventions.     With   [] TE   [] TA   [] neuro   [] other: Patient Education: [x] Review HEP    [] Progressed/Changed HEP based on:   [] positioning   [] body mechanics   [] transfers   [] heat/ice application    [] other:      Other Objective/Functional Measures:   See updated goals below  No clicking with lower trap activation with lat pull and floor press    Pain Level (0-10 scale) post treatment: 0    ASSESSMENT/Changes in Function: Pt has attended 32 sessions including initial evaluation S/P right Shoulder arthroscopic subacromial decompression, distal clavical excision, RCR, and longhead biceps tenodesis DOS 3/25/21. Pt continues to be very compliant with HEP however still c/o \"clicking\" at right clavicle with overhead movement. Pt able to decrease \"clicking\" with left oblique and lower trap facilitation to decrease right scapular elevation and inferior tipping with ability to complete lat pull and floor press today without pain or clicking with proper form. Right shoulder ROM is WFL except IR however improves post session with exercises manual techniques. Right shoulder strength is improving per MMT however most limited in lower trap and serratus still. Pt continues to c/o hypersensitively at right UT as well with not able to wear bras at times because of it. 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 Patient will continue to benefit from skilled PT services to modify and progress therapeutic interventions, address functional mobility deficits, address ROM deficits, address strength deficits, analyze and address soft tissue restrictions, analyze and cue movement patterns, analyze and modify body mechanics/ergonomics, assess and modify postural abnormalities and instruct in home and community integration to attain remaining goals. [x]  See Plan of Care  []  See progress note/recertification  []  See Discharge Summary         Progress towards goals / Updated goals:  Long Term Goals: LTG- To be accomplished in 8 week(s):  1.  Pt will report >/=80% improvement in symptoms to be able to complete ADLs with increased ease. Eval: 4/10 pain at worst by end of day.  Unable to put hair up and complete ADLs with ease  Last PN: 6/10 max pain in right UT, continued difficulty wearing bras however able to wear racer backs now with continued clicking with reaching overhead slow progress 10/22/21  Current: 6-7/10 max pain with wearing bra, reaching overhead, floor presses progressing 11/23/21     2.  Pt bilateral shoulder strength will improve in all planes to 5/5 MMT to allow pt to return to working out. Eval: left UE all planes 5/5, unable to assess right UE due to protocol. Will assess when appropriate  Last PN:progressing 10/22/21  Shoulder flexion 4/5              Shoulder abduction 4/5              Shoulder ER/IR 4/5          Lower trap 4+/5, serratus and rhomboids 4/5      Current: 5/5 all planes except lower trap and serratus 4/5 progressing 11/23/21     3. Pt right  strength will be equal to the left to allow pt to lift weights working out with increased ease. Eval: right 63#, left 80#  Last PN :85# right, left 80# goal MET 9/29/21     4.  Pt right shoulder AROM will improve to WFL to allow pt to shower and put hair up with increased ease. Eval:  PROM Right   Flexion 78*   ABD  58*   ER @ 90 Degrees @45* 24*   IR @ 90 Degrees @45*45*      Last PN: progressing 10/22/21  AROM Right   Flexion 180*   ABD  180*   ER @ 90 Degrees 90*   IR @ 90 Degrees 55* (80* post session)       Current: goal MET except IR 11/23/21  AROM Right   Flexion 167*   ABD  180*   ER @ 90 Degrees 90*   IR @ 90 Degrees 45* pre (75* post)        5.  Pt FOTO score will increase by >/= 26 points to show improvement in subjective function. Eval: 43  Last PN: 63 progressing 10/13/21  Current: 68 almost MET 11/23/21     Updated Goals: to be achieved in 5 treatments:  1.  Pt will be able to perform floor presses and other lifts without clicking and with good mechanics to allow pt to perform job related tasks.   Last PN:clicking still at times with presses's progressing 60/24/81   Current: no clicking with lower trap facilitation progressing 11/23/21     2.  Pt will report decreased Right UT sensitivity and be able to wear all clothing without restriction to improve quality of life.   Last PN still unable to wear bras due to pain and hypersensitivity progressing 10/22/21  Current: still unable to wear bra slow progress 11/23/21        PLAN  []  Upgrade activities as tolerated     [x]  Continue plan of care  []  Update interventions per flow sheet       []  Discharge due to:_  []  Other:_      Anish Mota 11/23/2021  1:51 PM    Future Appointments   Date Time Provider [place] Ames   11/23/2021  2:15 PM Anish Mota [facility]

## 2022-01-04 ENCOUNTER — HOSPITAL ENCOUNTER (OUTPATIENT)
Dept: PHYSICAL THERAPY | Age: 42
Discharge: HOME | End: 2022-01-04
Payer: COMMERCIAL

## 2022-01-04 PROCEDURE — 97530 THERAPEUTIC ACTIVITIES: CPT

## 2022-01-04 PROCEDURE — 97140 MANUAL THERAPY 1/> REGIONS: CPT

## 2022-01-04 PROCEDURE — 97162 PT EVAL MOD COMPLEX 30 MIN: CPT

## 2022-01-04 NOTE — PROGRESS NOTES
PT DAILY TREATMENT NOTE    Patient Name: Kavon Cheng  Date:2022  : 1980  [x]  Patient  Verified  Payor: Azra Davies / Plan: Cricket ADHIKARI / Product Type: Commerical /    In time:3:05 pm  Out time:4:05  pm   Total Treatment Time (min): 60  Total Timed Codes (min): 30  1:1 Treatment Time (MC/BCBS only): 60   Visit #: 1 of 10    Treatment Dx: Neck pain [M54.2]    SUBJECTIVE  Pain Level (0-10 scale): 5  Any medication changes, allergies to medications, adverse drug reactions, diagnosis change, or new procedure performed?: [x] No    [] Yes (see summary sheet for update)  Subjective functional status/changes:   [] No changes reported    Hx Present Illness: C/C of cervical pain   Indicates bilateral UT and cervical paraspinals, intermittent left side, constant right side  Recently had right RCR surgery and has intermittent right shoulder pain - anterior  Into UT  Denies radicular sx - had recent right cubital tunnel release on right hand - intermittent tingling into right 4th and 5th digits    Pain with: wearing bra - has to change regularly, typing, computer use, sitting at desk    Stiff with waking    Relieving: CBD cream, massage, ischemic compression to trigger points,     Reports regular headaches during the week - forehead and in suboccipital  Currently doing invisalign - has been doing for last 6-8 months - had receeding gum lines, having orthodontia to address bite restrictions  Indicates of grinding   Has hx of tongue thrusting and as child had 4 teeth extracted 2 top and 2 bottom      Pain:  _8__/10 max       __2_/10 min     _5___/10 now    Location: Indicates bilateral UT and cervical paraspinals, intermittent left side, constant right side     [] Sharp    [] Dull      [] Burning     []  Aching     [] Throbbing      [] Tingling     [x] Other: \"deep aching continuous pain. \"      [x]  Constant                   [] Intermittent        Previous treatment:   PT for shoulder  CBD Cream      PMHX: PMHx/Surgical Hx:  Joshua Loomiss, multiple right shoulder surgeries, right carpal tunnel and cubital tunnl release, L-spine surgery for spondylolisthesis, right ankle dislocation     Social/Recreation/Work: Work Hx: cross fit , nutrition counseling   Living Situation: lives with partner   Recreational Activities: exercise, weight lifting, school      Patient Goal(s): \"To be able to sit at my desk. To be able to wear a bra. To not have pain. \"    Cognition: A & O x 4      OBJECTIVE      30 min [x]Eval                  []Re-Eval     20 min Manual Therapy:  MILAD AIC correction, Sternal mobilization with active pelvic tilt, Superior T4 (MILAD technique), Right subclavius release (MILAD technique), MILAD infraclavicular rib pump with active breath   Obtained consent for hand placement   STM to SCM,scalelenes  Gentle distraction    Rationale: decrease pain, increase ROM, increase tissue extensibility, decrease trigger points and increase postural awareness to improve the patients ability to perform daily activities with decreased pain and symptom levels    The manual therapy interventions were performed at a separate and distinct time from the therapeutic activities interventions. With   [] TE   [x] TA 10 min   [] neuro   [] other: Patient Education: [x] Review HEP    [] Progressed/Changed HEP based on:   [] positioning   [] body mechanics   [] transfers   [] heat/ice application    [x] other:  pt education regarding exam findings, anatomy involved, POC        Other Objective/Functional Measures:    Movement/gait:      Visual Inspection: Thoracic: flattened  Lumbar: decreased   Shoulder/Scapula: right superior angle lower   Tipping and tilting vertebral border longer     Palpation: limited OA ext  Decreased segmental mobility C3-C7 bilaterally   TTP and increased tension in SCM, scalenes, digastrics                            AROM Right Left   Cervical flex: 32     Ext: 40     Lat Flex 35 24   Rot 55 54 Strength Right Left   Shoulder Flex 4 4   Abd 4 4   Scaption  4- 4   IR 4 4   ER 4 4            Special Tests Right Left   Spurlings  - -                              Reflexes Right Left   C5 1+ 1+   C6 1+ 1+   C7 2+ 2+                        Other Comments: Standing Forward Flexion forearms to floor, no reversal of lordosis  Scapular Rhythm:marked  dyskinesia and winging bilaterally, small cracking at right shoulder past 90*      MILAD Special Tests   HGIR:                                                 Right: 46             Left: 78  Hip Add Drop Test:                           Right: +             Left:+  Trunk Rot                                            Right: 15 in    Left 15 in    Shoulder Horizontal Abduction           Right: 45              Left: 32    Apical Ext Test:                                   Right: decreased        Left: decreased       Pain Level (0-10 scale) post treatment: 2    ASSESSMENT/Changes in Function:   Pt is a 35 y.o. right hand dominant female with C/C of cervical pain. Pt reports pain has been ongoing but has progressed of last several months. Pt indicates pain at lateral cervical spine into superior shoulder and UT. With right side always painful and intermittent left sided pain. Pt reports significant hypersensitivity along UT limiting tolerance to some clothing. Pt denies radicular sx or red flags. Had right carpal tunnel and cubital tunnel release in 2021 and continues to have intermittent sx to right 4th and 5 th digit. No radicular sx elicited with exam and special tests, reflexes symmetrical and no myotomal weakness - does have some residual right shoulder girdle weakness due to RCR in 2022. Other objective findings include postural adaptation - loss of spinal curvatures - marked scapular dyskinesia, decreased ROM and soft tissue restrictions.  Pt has significant dental hx and is currently in corrective orthodontia and has regular HA which may be a result of or contributing to cervical sx    Patient will continue to benefit from skilled PT services to modify and progress therapeutic interventions, address functional mobility deficits, address ROM deficits, address strength deficits, analyze and address soft tissue restrictions, analyze and cue movement patterns, analyze and modify body mechanics/ergonomics, assess and modify postural abnormalities and instruct in home and community integration to attain remaining goals. [x]  See Plan of Care  []  See progress note/recertification  []  See Discharge Summary         Progress towards goals / Updated goals:  Short Term Goals: STG- To be accomplished in 5 treatment(s):  1. Pt will be compliant with HEP to encourage prophylaxis. Eval:dispensed new exercise in addition to HEP from shoulder rehab    Long Term Goals: LTG- To be accomplished in 12 treatment(s):  1. Pt will have decreased sensitivity in UT to allow to wear bra a full day without having to change garments multiple times a day to increase tolerance to daily activities an improve quality of life. .  Eval:has to change bra 2-4 times a day     2. Pt will be able to reach overhead and lower repeatedly without right scapular clunking and clicking and good scapular control to indicate functional scapular stability and increase tolerance to daily and overhead activities . Eval:Scapular Rhythm:marked  dyskinesia and winging bilaterally, small cracking at right shoulder past 90*    3. Pt improve cervical ROM to [facility] in all directions to increase tolerance to daily activities. Eval:  AROM Right Left   Cervical flex: 32     Ext: 40     Lat Flex 35 24   Rot 55 54     4. Pt FOTO score will increase by >15 points to show improvement in function. Eval:55  Current: will address at visit 5    5. Pt will report being able to sit and work at desk and computer for 60 min or more to allow to perform job related activities.   Eval: pain with computer use and unable to sit at desk to work. PLAN  [x]  Upgrade activities as tolerated     [x]  Continue plan of care  []  Update interventions per flow sheet       []  Discharge due to:_  []  Other:_      Golden Etienne, PT, DPT 1/4/2022  3:02 PM    No future appointments.

## 2022-01-05 NOTE — PROGRESS NOTES
[facility] at the [address], [place], [address] [facility]  Phone: [phone]      Fax:  [phone]             Plan of Care/ Statement of Necessity for Physical Therapy Services      Patient name: Fiona Mccarty Start of Care: 2022   Referral source: JENNIFER Quevedo : 1980    Medical Diagnosis: Neck pain [M54.2]   Onset Date:Exacerbation over last 6 months     Treatment Diagnosis: Cervical pain    Prior Hospitalization: see medical history Provider#: [id number]   Medications: Verified on Patient summary List    Comorbidities: Ellers danlos, Hx of back pain, depression, HA, previous surgeries, recent right shoulder RCR, carpal tunnel release and cubital tunnel release    Prior Level of Function: Reports regular headaches during the week, Pain with: wearing bra - has to change regularly, typing, computer use, sitting at desk, right shoulder/scapular clicking and clunking with reaching overhead. The Plan of Care and following information is based on the information from the initial evaluation. Assessment/ key information:   Pt is a 35 y.o. right hand dominant female with C/C of cervical pain. Pt reports pain has been ongoing but has progressed of last several months. Pt indicates pain at lateral cervical spine into superior shoulder and UT. With right side always painful and intermittent left sided pain. Pt reports significant hypersensitivity along UT limiting tolerance to some clothing. Pt denies radicular sx or red flags. Had right carpal tunnel and cubital tunnel release in  and continues to have intermittent sx to right 4th and 5 th digit. No radicular sx elicited with exam and special tests, reflexes symmetrical and no myotomal weakness - does have some residual right shoulder girdle weakness due to RCR in .  Other objective findings include postural adaptation - loss of spinal curvatures - marked scapular dyskinesia, decreased ROM and soft tissue restrictions. Pt has significant dental hx and is currently in corrective orthodontia and has regular HA which may be a result of or contributing to cervical sx    Evaluation Complexity History HIGH Complexity :3+ comorbidities / personal factors will impact the outcome/ POC ; Examination HIGH Complexity : 4+ Standardized tests and measures addressing body structure, function, activity limitation and / or participation in recreation  ;Presentation MEDIUM Complexity : Evolving with changing characteristics  ; Clinical Decision Making MEDIUM Complexity : FOTO score of 26-74 FOTO score = an established functional score where 100 = no disability  Overall Complexity Rating: MEDIUM  Problem List: pain affecting function, decrease ROM, decrease strength, impaired gait/ balance, decrease ADL/ functional abilitiies, decrease activity tolerance and decrease flexibility/ joint mobility   Treatment Plan may include any combination of the following: Therapeutic exercise, Therapeutic activities, Neuromuscular re-education, Physical agent/modality, Gait/balance training, Manual therapy and Patient education  Vasopnuematic compression justification:  Per bilateral girth measures taken and listed above the edema is considered significant and having an impact on the patient's self care  Patient / Family readiness to learn indicated by: asking questions, trying to perform skills and interest  Persons(s) to be included in education: patient (P)  Barriers to Learning/Limitations: None  Patient Goal (s): To be able to sit at my desk. To be able to wear a bra. To not have pain  Patient Self Reported Health Status: good  Rehabilitation Potential: good    Short Term Goals: STG- To be accomplished in 5 treatment(s):  1. Pt will be compliant with HEP to encourage prophylaxis. Eval:dispensed new exercise in addition to HEP from shoulder rehab     Long Term Goals: LTG- To be accomplished in 12 treatment(s):  1.   Pt will have decreased sensitivity in UT to allow to wear bra a full day without having to change garments multiple times a day to increase tolerance to daily activities an improve quality of life. .  Eval:has to change bra 2-4 times a day      2. Pt will be able to reach overhead and lower repeatedly without right scapular clunking and clicking and good scapular control to indicate functional scapular stability and increase tolerance to daily and overhead activities . Eval:Scapular Rhythm:marked  dyskinesia and winging bilaterally, small cracking at right shoulder past 90*     3.  Pt improve cervical ROM to [facility] in all directions to increase tolerance to daily activities. Eval:  AROM Right Left   Cervical flex: 32       Ext: 40       Lat Flex 35 24   Rot 55 54      4. Pt FOTO score will increase by >15 points to show improvement in function. Eval:55  Current: will address at visit 5     5. Pt will report being able to sit and work at desk and computer for 60 min or more to allow to perform job related activities. Eval: pain with computer use and unable to sit at desk to work. Frequency / Duration: Patient to be seen 2 times per week for 6 weeks. Patient/ Caregiver education and instruction: Diagnosis, prognosis, self care, activity modification and exercises   [x]  Plan of care has been reviewed with PTA    Certification Period: RHIANNON Hickey, PT, DPT 1/4/2022 7:43 PM  _____________________________________________________________________  I certify that the above Therapy Services are being furnished while the patient is under my care. I agree with the treatment plan and certify that this therapy is necessary.     [de-identified] Signature:____________Date:_________TIME:________                                      JENNIFER Allen    ** Signature, Date and Time must be completed for valid certification **    Please sign and return to [facility] at the [facility] [address] [facility], [address] [facility]       Phone: [phone]      Fax:  [phone]

## 2022-01-06 ENCOUNTER — HOSPITAL ENCOUNTER (OUTPATIENT)
Dept: PHYSICAL THERAPY | Age: 42
Discharge: HOME | End: 2022-01-06
Payer: COMMERCIAL

## 2022-01-06 PROCEDURE — 97112 NEUROMUSCULAR REEDUCATION: CPT

## 2022-01-06 PROCEDURE — 97140 MANUAL THERAPY 1/> REGIONS: CPT

## 2022-01-06 PROCEDURE — 97110 THERAPEUTIC EXERCISES: CPT

## 2022-01-06 NOTE — PROGRESS NOTES
PT DAILY TREATMENT NOTE    Patient Name: Doug Arshad  Date:2022  : 1980  [x]  Patient  Verified  Payor: Philip Barrera / Plan: Tania Diane RPN / Product Type: Commerical /    In time:3:05  Out time:4:00  Total Treatment Time (min): 55  Total Timed Codes (min): 55  1:1 Treatment Time (MC/BCBS only): 54   Visit #: 2 of 12    Treatment Dx: Neck pain [M54.2]    SUBJECTIVE  Pain Level (0-10 scale): 5  Any medication changes, allergies to medications, adverse drug reactions, diagnosis change, or new procedure performed?: [x] No    [] Yes (see summary sheet for update)  Subjective functional status/changes:   [] No changes reported  \"Hurting today on the right. Still can't wear     OBJECTIVE      20 min Therapeutic Exercise:  [x] See flow sheet :   Rationale: increase ROM and increase strength to improve the patients ability to perform daily activities with decreased pain and symptom levels     25 min Neuromuscular Re-education:  [x]  See flow sheet :   Rationale: increase strength, improve coordination and increase proprioception  to improve the patients ability to perform daily activities with decreased pain and symptom levels    10 min Manual Therapy:  SOR, STM to scalenes, SCM, right UT, rib puming with breaths, infraclavicular rib pump with active pelvic tilt with verbal consent for hand placement    Rationale: decrease pain, increase ROM and increase tissue extensibility to improve the patients ability to perform daily activities with decreased pain and symptom levels  The manual therapy interventions were performed at a separate and distinct time from the therapeutic activities interventions.     With   [] TE   [] TA   [] neuro   [] other: Patient Education: [x] Review HEP    [] Progressed/Changed HEP based on:   [] positioning   [] body mechanics   [] transfers   [] heat/ice application    [] other:      Other Objective/Functional Measures:   Decrease left occulusion needing 2 tongue depressors with left stance to feel left molars  Decreased left post med expansion with long sit alternating reach  TTP right UT    Pain Level (0-10 scale) post treatment: 2    ASSESSMENT/Changes in Function: Pt tolerated session well with reporting decreased pain in right UT post session. Pt continues to report hypertensivity in right UT with inability to wear bra however CBD cream does help for a little while. Pt very challenged with finding left molars with exercises needing tongue depressors to help with decreased left occulusion. Very challenged with maintaining left eye gaze with head rotation in left stance. Patient will continue to benefit from skilled PT services to modify and progress therapeutic interventions, address functional mobility deficits, address ROM deficits, address strength deficits, analyze and address soft tissue restrictions, analyze and cue movement patterns, analyze and modify body mechanics/ergonomics, assess and modify postural abnormalities and instruct in home and community integration to attain remaining goals. [x]  See Plan of Care  []  See progress note/recertification  []  See Discharge Summary         Progress towards goals / Updated goals:  Short Term Goals: STG- To be accomplished in 5 treatment(s):  1.  Pt will be compliant with HEP to encourage prophylaxis.   Eval:dispensed new exercise in addition to HEP from shoulder rehab  Current: compliance, overview today progressing 1/6/22     Long Term Goals: LTG- To be accomplished in 12 treatment(s):  1.  Pt will have decreased sensitivity in UT to allow to wear bra a full day without having to change garments multiple times a day to increase tolerance to daily activities an improve quality of life. .  Eval:has to change bra 2-4 times a day      2.  Pt will be able to reach overhead and lower repeatedly without right scapular clunking and clicking and good scapular control to indicate functional scapular stability and increase tolerance to daily and overhead activities .  Eval:Scapular Rhythm:marked  dyskinesia and winging bilaterally, small cracking at right shoulder past 90*     3.  Pt improve cervical ROM to [facility] in all directions to increase tolerance to daily activities. Eval:  AROM Right Left   Cervical flex: 32       Ext: 40       Lat Flex 35 24   Rot 55 54      4.  Pt FOTO score will increase by >15 points to show improvement in function. Eval:55  Current: will address at visit 5     5. Pt will report being able to sit and work at desk and computer for 60 min or more to allow to perform job related activities.   Eval: pain with computer use and unable to sit at desk to work.         PLAN  []  Upgrade activities as tolerated     [x]  Continue plan of care  []  Update interventions per flow sheet       []  Discharge due to:_  []  Other:_      Jewel Jarrett 1/6/2022  2:01 PM    Future Appointments   Date Time Provider [place]te Ames   1/6/2022  3:00 PM Crystal Mota [facility]   1/11/2022  3:00 PM Susan Romero PT [facility]   1/13/2022 11:30 AM Liliam Goyla, PT, DPT [facility]

## 2022-01-11 ENCOUNTER — APPOINTMENT (OUTPATIENT)
Dept: PHYSICAL THERAPY | Age: 42
End: 2022-01-11
Payer: COMMERCIAL

## 2022-01-13 ENCOUNTER — APPOINTMENT (OUTPATIENT)
Dept: PHYSICAL THERAPY | Age: 42
End: 2022-01-13
Payer: COMMERCIAL

## 2022-01-17 NOTE — PROGRESS NOTES
[facility] at the [address], [place], [address] [facility]  Phone: [phone]      Fax:  [phone]            Discharge Summary    Patient name: Chantell Corea Start of Care: 4/15/21   Referral source: Jaylon Piper MD : 1980   Medical/Treatment Diagnosis: Pain radiating to right shoulder [M25.511] Onset Date:DOS 3/25/21      Prior Hospitalization: see medical history Provider#: [id number]   Medications: Verified on Patient Summary List     Comorbidities: Bankart repair when 14yo, L4/L5 spinal fusion 10/2017, Right carpal and cubital tunnel release DOS 21, EDS, depression   Prior Level of Function: working out, crossfit, complete ADLs with ease and no pain      Visits from Start of Care: 32    Missed Visits: 1    Reporting Period : 4/15/21 to 22    Goals/Measure of Progress:  Long Term Goals: LTG- To be accomplished in 8 week(s):  1.  Pt will report >/=80% improvement in symptoms to be able to complete ADLs with increased ease. Eval: 4/10 pain at worst by end of day. Unable to put hair up and complete ADLs with ease  Last PN: 6/10 max pain in right UT, continued difficulty wearing bras however able to wear racer backs now with continued clicking with reaching overhead slow progress 10/22/21  Current: 6-7/10 max pain with wearing bra, reaching overhead, floor presses progressing 21     2.  Pt bilateral shoulder strength will improve in all planes to 5/5 MMT to allow pt to return to working out. Eval: left UE all planes 5/5, unable to assess right UE due to protocol. Will assess when appropriate  Last PN:progressing 10/22/21  Shoulder flexion 4/5              Shoulder abduction 4/5              Shoulder ER/IR 4/5          Lower trap 4+/5, serratus and rhomboids 4/5      Current: 5/5 all planes except lower trap and serratus 4/5 progressing 21     3.  Pt right  strength will be equal to the left to allow pt to lift weights working out with increased ease. Eval: right 63#, left 80#  Last PN :85# right, left 80# goal MET 9/29/21     4.  Pt right shoulder AROM will improve to WFL to allow pt to shower and put hair up with increased ease. Eval:  PROM Right   Flexion 78*   ABD  58*   ER @ 90 Degrees @45* 24*   IR @ 90 Degrees @45*45*      Last PN: progressing 10/22/21  AROM Right   Flexion 180*   ABD  180*   ER @ 90 Degrees 90*   IR @ 90 Degrees 55* (80* post session)       Current: goal MET except IR 11/23/21  AROM Right   Flexion 167*   ABD  180*   ER @ 90 Degrees 90*   IR @ 90 Degrees 45* pre (75* post)         5.  Pt FOTO score will increase by >/= 26 points to show improvement in subjective function. Eval: 43  Last PN: 63 progressing 10/13/21  Current: 68 almost MET 11/23/21     Updated Goals: to be achieved in 5 treatments:  1. Pt will be able to perform floor presses and other lifts without clicking and with good mechanics to allow pt to perform job related tasks.   Last PN:clicking still at times with presses's progressing 46/65/37   Current: no clicking with lower trap facilitation progressing 11/23/21     2. Pt will report decreased Right UT sensitivity and be able to wear all clothing without restriction to improve quality of life.   Last PN still unable to wear bras due to pain and hypersensitivity progressing 10/22/21  Current: still unable to wear bra slow progress 11/23/21    Assessment/ Summary of Care: Patient is a 34 yo female [facility] with c/o right shoulder pain s/p right Shoulder arthroscopic subacromial decompression, distal clavical excision, RCR, and longhead biceps tenodesis DOS 3/25/21. Patient was last seen in the clinic on 11/23/21; however, was not able to schedule additional visits due to lack of insurance authorization.  At that time, patient demonstrated right shoulder ROM WFL apart from IR, improving strength with continued limitation in lower trap and serratus, and continued hypersensitivity of right UT. Patient also very compliant with HEP at that time. Patient is D/C from physical therapy.  Thank you for this referral.     RECOMMENDATIONS:  [x]Discontinue therapy: [x]Patient has reached or is progressing toward set goals      []Patient is non-compliant or has abdicated      []Due to lack of appreciable progress towards set goals    Tony Pace, PT 1/17/2022 2:43 PM

## 2022-01-18 ENCOUNTER — HOSPITAL ENCOUNTER (OUTPATIENT)
Dept: PHYSICAL THERAPY | Age: 42
Discharge: HOME | End: 2022-01-18
Payer: COMMERCIAL

## 2022-01-18 PROCEDURE — 97140 MANUAL THERAPY 1/> REGIONS: CPT

## 2022-01-18 PROCEDURE — 97110 THERAPEUTIC EXERCISES: CPT

## 2022-01-18 PROCEDURE — 97112 NEUROMUSCULAR REEDUCATION: CPT

## 2022-01-18 NOTE — PROGRESS NOTES
PT DAILY TREATMENT NOTE    Patient Name: Kavon Cheng  Date:2022  : 1980  [x]  Patient  Verified  Payor: [address] [place] [address] Road / Plan: Cricket Oliveira RPN / Product Type: Commerical /    In time:11:35  Out time:12:25  Total Treatment Time (min): 50  Total Timed Codes (min): 50  1:1 Treatment Time (MC/BCBS only): 50   Visit #: 3 of 12    Treatment Dx: Neck pain [M54.2]    SUBJECTIVE  Pain Level (0-10 scale): 3  Any medication changes, allergies to medications, adverse drug reactions, diagnosis change, or new procedure performed?: [x] No    [] Yes (see summary sheet for update)  Subjective functional status/changes:   [] No changes reported  \"Sorry I wasn't here I had Covid. \"    OBJECTIVE    10 min Therapeutic Exercise:  [x] See flow sheet :   Rationale: increase ROM and increase strength to improve the patients ability to perform daily activities with decreased pain and symptom levels    30 min Neuromuscular Re-education:  [x]  See flow sheet :   Rationale: increase strength, improve coordination, improve balance and increase proprioception  to improve the patients ability to perform daily activities with decreased pain and symptom levels    10 min Manual Therapy:  STM to right UT, SOR, infraclavicular rib pumping    Rationale: decrease pain, increase ROM and increase tissue extensibility to improve the patients ability to perform daily activities with decreased pain and symptom levels  The manual therapy interventions were performed at a separate and distinct time from the therapeutic activities interventions.     With   [] TE   [] TA   [] neuro   [] other: Patient Education: [x] Review HEP    [] Progressed/Changed HEP based on:   [] positioning   [] body mechanics   [] transfers   [] heat/ice application    [] other:      Other Objective/Functional Measures:   MILAD Special Tests (pre/post)  HGIR:                                                 Right: 52*/70*             Left: 80*/80*  Trunk Rot Right: 13in/    Left 13in /      Pain Level (0-10 scale) post treatment: 0    ASSESSMENT/Changes in Function: Pt tolerated session well with reporting no pain post session. Pt reporting keeping monitor on right and forward with education to switch to left side to help with right sided neck pain and increase gaze to left. Pt reporting increased left side feeling with sacropheno today. Improved left gaze stability noted with left stance head turns today. Patient will continue to benefit from skilled PT services to modify and progress therapeutic interventions, address functional mobility deficits, address ROM deficits, address strength deficits, analyze and address soft tissue restrictions, analyze and cue movement patterns, analyze and modify body mechanics/ergonomics, assess and modify postural abnormalities and instruct in home and community integration to attain remaining goals. [x]  See Plan of Care  []  See progress note/recertification  []  See Discharge Summary         Progress towards goals / Updated goals:  Short Term Goals: STG- To be accomplished in 5 treatment(s):  1.  Pt will be compliant with HEP to encourage prophylaxis.   Eval:dispensed new exercise in addition to HEP from shoulder rehab  Current: compliance, overview today progressing 1/6/22     Long Term Goals: LTG- To be accomplished in 12 treatment(s):  1.  Pt will have decreased sensitivity in UT to allow to wear bra a full day without having to change garments multiple times a day to increase tolerance to daily activities an improve quality of life. .  Eval:has to change bra 2-4 times a day   Current: no change since eval progressing 1/18/22     2.  Pt will be able to reach overhead and lower repeatedly without right scapular clunking and clicking and good scapular control to indicate functional scapular stability and increase tolerance to daily and overhead activities .  Laci Carney and winging bilaterally, small cracking at right shoulder past 90*     3.  Pt improve cervical ROM to [facility] in all directions to increase tolerance to daily activities. Eval:  AROM Right Left   Cervical flex: 32       Ext: 40       Lat Flex 35 24   Rot 55 54      4.  Pt FOTO score will increase by >15 points to show improvement in function. Eval:55  Current: will address at visit 5     5. Pt will report being able to sit and work at desk and computer for 60 min or more to allow to perform job related activities.   Eval: pain with computer use and unable to sit at desk to work.   Current: education to switch other monitor to left versus right side progressing 1/18/22      PLAN  []  Upgrade activities as tolerated     [x]  Continue plan of care  []  Update interventions per flow sheet       []  Discharge due to:_  []  Other:_      Cricket Younger 1/18/2022  9:53 AM    Future Appointments   Date Time Provider [place]te Ames   1/18/2022 11:30 AM Marco [facility]   1/28/2022  1:30 PM Sara Brown, PT, DPT [facility]   2/1/2022 11:30 AM Marco Mota [facility]   2/3/2022 12:15 PM Sara Brown, PT, DPT [facility]   2/8/2022 11:30 AM Marco Mota [facility]   2/10/2022 11:30 AM Marco Mota [facility]   2/15/2022 11:30 AM Marco Mota [facility]

## 2022-01-28 ENCOUNTER — HOSPITAL ENCOUNTER (OUTPATIENT)
Dept: PHYSICAL THERAPY | Age: 42
Discharge: HOME | End: 2022-01-28
Payer: COMMERCIAL

## 2022-01-28 PROCEDURE — 97530 THERAPEUTIC ACTIVITIES: CPT

## 2022-01-28 PROCEDURE — 97140 MANUAL THERAPY 1/> REGIONS: CPT

## 2022-01-28 PROCEDURE — 97112 NEUROMUSCULAR REEDUCATION: CPT

## 2022-01-28 NOTE — PROGRESS NOTES
PT DAILY TREATMENT NOTE    Patient Name: Coby Perkins  Date:2022  : 1980  [x]  Patient  Verified  Payor: Juan José Cummings / Plan: Glenn Nichols RPN / Product Type: Commerical /    In time:1:32 pm  Out time:2:35 pm   Total Treatment Time (min): 60 (waiting on PT)  Total Timed Codes (min): 60  Visit #: 4 of 12    Treatment Dx: Neck pain [M54.2]    SUBJECTIVE  Pain Level (0-10 scale): 4  Any medication changes, allergies to medications, adverse drug reactions, diagnosis change, or new procedure performed?: [x] No    [] Yes (see summary sheet for update)  Subjective functional status/changes:   [] No changes reported  Increased sensitivity around scars at right wrist.  Wakes up with neck pain. \"I feel like I  Need a new pillow. \"  Right scapular clicking with \"certain positions with shoulder presses\"    OBJECTIVE      10 min Therapeutic Activity:  [x]  See flow sheet :   Rationale: increase ROM, increase strength, improve coordination and increase proprioception  to improve the patients ability to perform daily activities with decreased pain and symptom levels       35 min Neuromuscular Re-education:  [x]  See flow sheet :included vacuum cupping to left posterior mediastinum in long sitting with alternating reach    Rationale: increase ROM, increase strength, improve coordination and increase proprioception  to improve the patients ability to perform daily activities with decreased pain and symptom levels      15 min Manual Therapy:  MILAD AIC correction, Sternal mobilization with active pelvic tilt, Superior T4 (MILAD technique), Right subclavius release (MILAD technique), MILAD infraclavicular rib pump with active breath   Cervical distraction  STM to bilateral SCM and scalenes  Obtained consent for hand placement      Rationale: decrease pain, increase ROM, increase tissue extensibility, decrease trigger points and increase postural awareness to improve the patients ability to perform daily activities with decreased pain and symptom levels    The manual therapy interventions were performed at a separate and distinct time from the therapeutic activities interventions. With   [] TE   [] TA   [] neuro   [] other: Patient Education: [x] Review HEP    [] Progressed/Changed HEP based on:   [] positioning   [] body mechanics   [] transfers   [] heat/ice application    [] other:      Other Objective/Functional Measures:   MILAD Special Tests (pre/post)  HGIR:                                                 Right: 70/85             Left: 74/85  Hip Add Drop Test:                           Right: +/-            Left:+/-  Trunk Rot                                            Right: 14.5 in/13 in Left 14.5 in /13 in  Shoulder Horizontal Abduction           Right: 40 /NT             Left: 35 /NT    AROM Right Left   Lat Flex 36 34   Rot 60 58      Good apical expansion on the right with manual     Pain Level (0-10 scale) post treatment: 0    ASSESSMENT/Changes in Function:   Pt reported that has UT pain with power cleans for 1-2 days after. Primarily the right UT that is painful. Continued sensitivity to clothes especially bra - has to change 1-3 times per day. Responding very well to posterior mediastinum inhibition activities. Had hip flexor cramping after seated balloon with reach. Patient will continue to benefit from skilled  PT services to modify and progress therapeutic interventions, address functional mobility deficits, address ROM deficits, address strength deficits, analyze and address soft tissue restrictions, analyze and cue movement patterns, analyze and modify body mechanics/ergonomics, assess and modify postural abnormalities and instruct in home and community integration to attain remaining goals. [x]  See Plan of Care  []  See progress note/recertification  []  See Discharge Summary         Progress towards goals / Updated goals:  Short Term Goals: STG- To be accomplished in 5 treatment(s):  1.  Pt will be compliant with HEP to encourage prophylaxis. Eval:dispensed new exercise in addition to HEP from shoulder rehab  Current: compliance, overview today progressing 1/6/22     Long Term Goals: LTG- To be accomplished in 12 treatment(s):  1.  Pt will have decreased sensitivity in UT to allow to wear bra a full day without having to change garments multiple times a day to increase tolerance to daily activities an improve quality of life. .  Eval:has to change bra 2-4 times a day   Current: has to change 1-3 times per day  progressing 1/28/22     2.  Pt will be able to reach overhead and lower repeatedly without right scapular clunking and clicking and good scapular control to indicate functional scapular stability and increase tolerance to daily and overhead activities .  Eval:Scapular Rhythm:marked  dyskinesia and winging bilaterally, small cracking at right shoulder past 90*     3.  Pt improve cervical ROM to [facility] in all directions to increase tolerance to daily activities. Eval:  AROM Right Left   Cervical flex: 32       Ext: 40       Lat Flex 35 24   Rot 55 54     Current: Progressing 1/28/22   AROM Right Left   Lat Flex 36 34   Rot 60 58       4.  Pt FOTO score will increase by >15 points to show improvement in function. Eval:55  Current: will address at visit 5     5. Pt will report being able to sit and work at desk and computer for 60 min or more to allow to perform job related activities.   Eval: pain with computer use and unable to sit at desk to work.   Current: education to switch other monitor to left versus right side progressing 1/18/22        PLAN  []  Upgrade activities as tolerated     [x]  Continue plan of care  []  Update interventions per flow sheet       []  Discharge due to:_  []  Other:_      Piyush Tidwell, PT, DPT 1/28/2022  1:35 PM    Future Appointments   Date Time Provider [place]te Ames   2/1/2022 11:30 AM Billie Nunez PT, DPT [facility]   2/3/2022 12:15 PM Billie Nunez PT, DPT [facility]   2/8/2022 11:30 AM RemesicMahendra [facility]   2/10/2022 11:30 AM RemesicMahendra [facility]   2/15/2022 11:30 AM RemesicMahendra [facility]

## 2022-02-01 ENCOUNTER — HOSPITAL ENCOUNTER (OUTPATIENT)
Dept: PHYSICAL THERAPY | Age: 42
Discharge: HOME | End: 2022-02-01
Payer: COMMERCIAL

## 2022-02-01 PROCEDURE — 97112 NEUROMUSCULAR REEDUCATION: CPT

## 2022-02-01 PROCEDURE — 97140 MANUAL THERAPY 1/> REGIONS: CPT

## 2022-02-01 PROCEDURE — 97530 THERAPEUTIC ACTIVITIES: CPT

## 2022-02-01 NOTE — PROGRESS NOTES
PT DAILY TREATMENT NOTE    Patient Name: Stephen Johnston  OTZR:  : 1980  [x]  Patient  Verified  Payor: Berna Max / Plan: Dar Kenny RPN / Product Type: Commerical /    In time:11:40  Out time:12:42 pm   Total Treatment Time (min): 60 (waiting on PT)  Total Timed Codes (min): 60  Visit #: 5 of 12    Treatment Dx: Neck pain [M54.2]    SUBJECTIVE  Pain Level (0-10 scale): 3  Any medication changes, allergies to medications, adverse drug reactions, diagnosis change, or new procedure performed?: [x] No    [] Yes (see summary sheet for update)  Subjective functional status/changes:   [] No changes reported  6-7/10 as worst pain in last week. \"I cupped a lot on . I need to figure out how to turn my right upper trap off. \"    OBJECTIVE      10 min Therapeutic Activity:  [x]  See flow sheet :   Rationale: increase ROM, increase strength, improve coordination and increase proprioception  to improve the patients ability to perform daily activities with decreased pain and symptom levels       35 min Neuromuscular Re-education:  [x]  See flow sheet [de-identified]included vacuum cupping to left posterior mediastinum in long sitting with alternating reach    Rationale: increase ROM, increase strength, improve coordination and increase proprioception  to improve the patients ability to perform daily activities with decreased pain and symptom levels      15 min Manual Therapy:  MILAD AIC correction, Sternal mobilization with active pelvic tilt, Superior T4 (MILAD technique), Right subclavius release (MILAD technique), MILAD infraclavicular rib pump with active breath   Cervical distraction  STM to bilateral SCM and scalenes  Obtained consent for hand placement      Rationale: decrease pain, increase ROM, increase tissue extensibility, decrease trigger points and increase postural awareness to improve the patients ability to perform daily activities with decreased pain and symptom levels    The manual therapy interventions were performed at a separate and distinct time from the therapeutic activities interventions. With   [] TE   [] TA   [] neuro   [] other: Patient Education: [x] Review HEP    [] Progressed/Changed HEP based on:   [] positioning   [] body mechanics   [] transfers   [] heat/ice application    [] other:      Other Objective/Functional Measures:   MILAD Special Tests (pre/post)  HGIR:                                                 Right: 61/90             Left: 65/90  Hip Add Drop Test:                           Right: +/-            Left:+/-  Trunk Rot                                            Right: 15 in/14 in Left 15 in /14 in  Shoulder Horizontal Abduction           Right: 35 /45             Left: 31 /45    FOTO 64     Pain Level (0-10 scale) post treatment: 1    ASSESSMENT/Changes in Function:   Pt responded well to interventions. Required max cues to engage left hamstring with standing right trap activity. Pt has been seen for 5 visits with C/C of cervical pain and significant hypersensitivity along UT limiting tolerance to some clothing. Pt reports pain has been ongoing but has progressed of last several months. Pt indicates pain at lateral cervical spine into superior shoulder and UT. With right side always painful and intermittent left sided pain. Pt denies radicular sx or red flags. Had right carpal tunnel and cubital tunnel release in 2021 and continues to have intermittent sx to right 4th and 5 th digit. Continues to have hypersensitivity with clothing at UT requiring pt to change bra multiple times per day. Pt did report being able to complete floor dumbbell presses with decreased right scapular clicking and no increase in pain.  Treatment has included therapeutic exercises, activities and neuromuscular reeducation to address postural adaptations, scapular mechanics, trunk and pelvis repositioning     Patient will continue to benefit from skilled PT services to modify and progress therapeutic interventions, address functional mobility deficits, address ROM deficits, address strength deficits, analyze and address soft tissue restrictions, analyze and cue movement patterns, analyze and modify body mechanics/ergonomics, assess and modify postural abnormalities and instruct in home and community integration to attain remaining goals. [x]  See Plan of Care  []  See progress note/recertification  []  See Discharge Summary         Progress towards goals / Updated goals:  Short Term Goals: STG- To be accomplished in 5 treatment(s):  1.  Pt will be compliant with HEP to encourage prophylaxis. Eval:dispensed new exercise in addition to HEP from shoulder rehab  Current: compliance, overview today progressing 1/6/22     Long Term Goals: LTG- To be accomplished in 12 treatment(s):  1.  Pt will have decreased sensitivity in UT to allow to wear bra a full day without having to change garments multiple times a day to increase tolerance to daily activities an improve quality of life. .  Eval:has to change bra 2-4 times a day   Current: has to change 1-3 times per day  progressing 2/1/22     2.  Pt will be able to reach overhead and lower repeatedly without right scapular clunking and clicking and good scapular control to indicate functional scapular stability and increase tolerance to daily and overhead activities .  Eval:Scapular Rhythm:marked  dyskinesia and winging bilaterally, small cracking at right shoulder past 90*  Current: Progressing 3/1/15 continued clicking, especially overhead, was recently able to perform 4 sets of >10 floor presses with minimal clicking,  Recently very challenged with standing lower trap exercises.      3.  Pt improve cervical ROM to [facility] in all directions to increase tolerance to daily activities.   Eval:  AROM Right Left   Cervical flex: 32       Ext: 40       Lat Flex 35 24   Rot 55 54      Current: Progressing 1/28/22   AROM Right Left   Lat Flex 36 34   Rot 60 58       4.  Pt FOTO score will increase by >15 points to show improvement in function. Eval:55  Current: Progressing 2/1/22 64     5. Pt will report being able to sit and work at desk and computer for 60 min or more to allow to perform job related activities.   Eval: pain with computer use and unable to sit at desk to work.   Current: education to switch other monitor to left versus right side with mild success but continued pain with increased sitting past 30 min  Progressing 2/1/22           PLAN  [x]  Upgrade activities as tolerated     [x]  Continue plan of care  []  Update interventions per flow sheet       []  Discharge due to:_  []  Other:_      Martin Wong, PT, DPT 2/1/2022  11:44 AM    Future Appointments   Date Time Provider [place]te Ames   2/3/2022 12:15 PM Jaron Dooley PT, DPT [facility]   2/8/2022 11:30 AM Santino Mota [facility]   2/10/2022 11:30 AM Santino Mota [facility]   2/15/2022 11:30 AM Santino Mota [facility]

## 2022-02-01 NOTE — PROGRESS NOTES
[facility] at the [address], [place], [address] [facility]  Phone: [phone]      Fax:  [phone]    Progress Note  Patient name: Stephen Johnston Start of Care: 2022   Referral source: JENNIFER Roldan : 1980               Medical Diagnosis: Neck pain [M54.2]    Onset Date:Exacerbation over last 6 months               Treatment Diagnosis: Cervical pain    Prior Hospitalization: see medical history Provider#: [id number]   Medications: Verified on Patient summary List    Comorbidities: Ellers danlos, Hx of back pain, depression, HA, previous surgeries, recent right shoulder RCR, carpal tunnel release and cubital tunnel release    Prior Level of Function: Reports regular headaches during the week, Pain with: wearing bra - has to change regularly, typing, computer use, sitting at desk, right shoulder/scapular clicking and clunking with reaching overhead. Visits from Start of Care: 5    Missed Visits: 0    Progress Towards Goals:   Short Term Goals: STG- To be accomplished in 5 treatment(s):  1.  Pt will be compliant with HEP to encourage prophylaxis.   Eval:dispensed new exercise in addition to HEP from shoulder rehab  Current: compliance, overview today progressing 22     Long Term Goals: LTG- To be accomplished in 12 treatment(s):  1.  Pt will have decreased sensitivity in UT to allow to wear bra a full day without having to change garments multiple times a day to increase tolerance to daily activities an improve quality of life. .  Eval:has to change bra 2-4 times a day   Current: has to change 1-3 times per day  progressing 22     2.  Pt will be able to reach overhead and lower repeatedly without right scapular clunking and clicking and good scapular control to indicate functional scapular stability and increase tolerance to daily and overhead activities .  Eval:Scapular Rhythm:marked  dyskinesia and winging bilaterally, small cracking at right shoulder past 90*  Current: Progressing 8/0/63 continued clicking, especially overhead, was recently able to perform 4 sets of >10 floor presses with minimal clicking,  Recently very challenged with standing lower trap exercises.      3.  Pt improve cervical ROM to [facility] in all directions to increase tolerance to daily activities. Eval:  AROM Right Left   Cervical flex: 32       Ext: 40       Lat Flex 35 24   Rot 55 54      Current: Progressing 1/28/22   AROM Right Left   Lat Flex 36 34   Rot 60 58         4.  Pt FOTO score will increase by >15 points to show improvement in function. Eval:55  Current: Progressing 2/1/22 64     5. Pt will report being able to sit and work at desk and computer for 60 min or more to allow to perform job related activities. Eval: pain with computer use and unable to sit at desk to work.   Current: education to switch other monitor to left versus right side with mild success but continued pain with increased sitting past 30 min  Progressing 2/1/22      Key Functional Changes:   Pt has been seen for 5 visits with C/C of cervical pain and significant hypersensitivity along UT limiting tolerance to some clothing. Pt reports pain has been ongoing but has progressed of last several months. Pt indicates pain at lateral cervical spine into superior shoulder and UT. With right side always painful and intermittent left sided pain. Pt denies radicular sx or red flags. Had right carpal tunnel and cubital tunnel release in 2021 and continues to have intermittent sx to right 4th and 5 th digit. Continues to have hypersensitivity with clothing at UT requiring pt to change bra multiple times per day. Pt did report being able to complete floor dumbbell presses with decreased right scapular clicking and no increase in pain.  Treatment has included therapeutic exercises, activities and neuromuscular reeducation to address postural adaptations, scapular mechanics, trunk and pelvis repositioning      Updated Goals: to be achieved in 8 treatments:   Same as above     ASSESSMENT/RECOMMENDATIONS:  [x]Continue therapy per initial plan/protocol at a frequency of  8 treatments  []Continue therapy with the following recommended changes:_____________________      _____________________________________________________________________  []Discontinue therapy progressing towards or have reached established goals  []Discontinue therapy due to lack of appreciable progress towards goals  []Discontinue therapy due to lack of attendance or compliance  []Await Physician's recommendations/decisions regarding therapy  []Other:________________________________________________________________    Thank you for this referral.   Melanie Tidwell PT, DPT 2/1/2022 2:50 PM

## 2022-02-03 ENCOUNTER — HOSPITAL ENCOUNTER (OUTPATIENT)
Dept: PHYSICAL THERAPY | Age: 42
Discharge: HOME | End: 2022-02-03
Payer: COMMERCIAL

## 2022-02-03 PROCEDURE — 97140 MANUAL THERAPY 1/> REGIONS: CPT

## 2022-02-03 PROCEDURE — 97112 NEUROMUSCULAR REEDUCATION: CPT

## 2022-02-03 PROCEDURE — 97530 THERAPEUTIC ACTIVITIES: CPT

## 2022-02-03 NOTE — PROGRESS NOTES
PT DAILY TREATMENT NOTE    Patient Name: Coby Perkins  YRJB:  : 1980  [x]  Patient  Verified  Payor: Juan José Cummings / Plan: Glenn Nichols RPN / Product Type: Commerical /    In time:12:20 pm  Out time:1:20 pm   Total Treatment Time (min): 60  Total Timed Codes (min): 60  1:1 Treatment Time (MC/BCBS only): 60   Visit #: 6 of 13    Treatment Dx: Neck pain [M54.2]    SUBJECTIVE  Pain Level (0-10 scale): 3  Any medication changes, allergies to medications, adverse drug reactions, diagnosis change, or new procedure performed?: [x] No    [] Yes (see summary sheet for update)  Subjective functional status/changes:   [] No changes reported  \"Just tight today. \"    OBJECTIVE    8 min Therapeutic Activity:  [x]  See flow sheet :   Rationale: increase ROM, increase strength, improve coordination and increase proprioception  to improve the patients ability to perform daily activities with decreased pain and symptom levels       37 min Neuromuscular Re-education:  [x]  See flow sheet :   Rationale: increase ROM, increase strength, improve coordination and increase proprioception  to improve the patients ability to perform daily activities with decreased pain and symptom levels      15 min Manual Therapy:  MILAD AIC correction, Sternal mobilization with active pelvic tilt, Superior T4 (MILAD technique), Right subclavius release (MILAD technique), MILAD infraclavicular rib pump with active breath   Cervical distraction  STM to bilateral SCM and scalenes  Obtained consent for hand placement    Rationale: decrease pain, increase ROM, increase tissue extensibility, decrease trigger points and increase postural awareness to improve the patients ability to perform daily activities with decreased pain and symptom levels    The manual therapy interventions were performed at a separate and distinct time from the therapeutic activities interventions.           With   [] TE   [] TA   [] neuro   [] other: Patient Education: [x] Review HEP    [] Progressed/Changed HEP based on:   [] positioning   [] body mechanics   [] transfers   [] heat/ice application    [] other:      Other Objective/Functional Measures:   MILAD Special Tests (pre/post)  HGIR:                                                 Right: 64/85              Left: 73/90  Hip Add Drop Test:                           Right: +/-             Left:+/-  Passive Abduction Raise  Right: -    Left: -    Trunk Rot                                            Right: 16 in/14 in    Left 14.5 in  /14 in   Shoulder Horizontal Abduction           Right: 45 /NT              Left: 33 /45     Apical Ext Test:                                   Right: decreased/mod   Left: decreased/mod       Pain Level (0-10 scale) post treatment: 1    ASSESSMENT/Changes in Function:   Challenged with standing resisted alternating reach. Appears to be responding well to posterior mediastinum inhibition program. Added standing to HEP. Pt reported noticing left foot forward with squats and burpees - suspect due to posterior hip capsule tightness. Used cues to keep tongue on roof of mouth and eyes ahead with standing activities to decrease use of anterior cervical musculature. Patient will continue to benefit from skilled PT services to modify and progress therapeutic interventions, address functional mobility deficits, address ROM deficits, address strength deficits, analyze and address soft tissue restrictions, analyze and cue movement patterns, analyze and modify body mechanics/ergonomics, assess and modify postural abnormalities and instruct in home and community integration to attain remaining goals. [x]  See Plan of Care  []  See progress note/recertification  []  See Discharge Summary         Progress towards goals / Updated goals:  Short Term Goals: STG- To be accomplished in 5 treatment(s):  1.  Pt will be compliant with HEP to encourage prophylaxis.   Eval:dispensed new exercise in addition to HEP from shoulder rehab  Last PN: compliance, overview today progressing 1/6/22  Current: MET 2/3/22     Long Term Goals: LTG- To be accomplished in 12 treatment(s):  1.  Pt will have decreased sensitivity in UT to allow to wear bra a full day without having to change garments multiple times a day to increase tolerance to daily activities an improve quality of life. .  Eval:has to change bra 2-4 times a day   Last PN: has to change 1-3 times per day  progressing 2/1/22     2.  Pt will be able to reach overhead and lower repeatedly without right scapular clunking and clicking and good scapular control to indicate functional scapular stability and increase tolerance to daily and overhead activities .  Eval:Scapular Rhythm:marked  dyskinesia and winging bilaterally, small cracking at right shoulder past 90*  Last PN: Progressing 6/2/63 continued clicking, especially overhead, was recently able to perform 4 sets of >10 floor presses with minimal clicking,  Recently very challenged with standing lower trap exercises.      3.  Pt improve cervical ROM to [facility] in all directions to increase tolerance to daily activities. Eval:  AROM Right Left   Cervical flex: 32       Ext: 40       Lat Flex 35 24   Rot 55 54      Last PN: Progressing 1/28/22   AROM Right Left   Lat Flex 36 34   Rot 60 58         4.  Pt FOTO score will increase by >15 points to show improvement in function. Eval:55  Last PN: Progressing 2/1/22 64     5. Pt will report being able to sit and work at desk and computer for 60 min or more to allow to perform job related activities.   Eval: pain with computer use and unable to sit at desk to work.   Last PN: education to switch other monitor to left versus right side with mild success but continued pain with increased sitting past 30 min  Progressing 2/1/22           PLAN  [x]  Upgrade activities as tolerated     [x]  Continue plan of care  []  Update interventions per flow sheet       []  Discharge due to:_  []  Other:_ Francisca Sosa, PT, DPT 2/3/2022  11:14 AM    Future Appointments   Date Time Provider Juliette Hui   2/3/2022 12:15 PM Angela Heart PT, DPT [facility]   2/8/2022 11:30 AM [facility]   2/10/2022 11:30 AM Kelli Mota [facility]   2/15/2022 11:30 AM Kelli Mota [facility]

## 2022-02-08 ENCOUNTER — HOSPITAL ENCOUNTER (OUTPATIENT)
Dept: PHYSICAL THERAPY | Age: 42
Discharge: HOME | End: 2022-02-08
Payer: COMMERCIAL

## 2022-02-08 PROCEDURE — 97140 MANUAL THERAPY 1/> REGIONS: CPT

## 2022-02-08 PROCEDURE — 97112 NEUROMUSCULAR REEDUCATION: CPT

## 2022-02-08 PROCEDURE — 97110 THERAPEUTIC EXERCISES: CPT

## 2022-02-08 NOTE — PROGRESS NOTES
PT DAILY TREATMENT NOTE    Patient Name: Zach Garcia  Date:2022  : 1980  [x]  Patient  Verified  Payor: Riri Jobs / Plan: Cirilodarrian Medley RPN / Product Type: Commerical /    In time:11:33  Out time:12:35  Total Treatment Time (min): 62  Total Timed Codes (min): 62  1:1 Treatment Time (MC/BCBS only): 62   Visit #: 7 of 13    Treatment Dx: Neck pain [M54.2]    SUBJECTIVE  Pain Level (0-10 scale): 3  Any medication changes, allergies to medications, adverse drug reactions, diagnosis change, or new procedure performed?: [x] No    [] Yes (see summary sheet for update)  Subjective functional status/changes:   [] No changes reported  \"tight and still have that upper trap sensitivity. Its very frustrating. \"    OBJECTIVE      20 min Therapeutic Exercise:  [x] See flow sheet :   Rationale: increase ROM and increase strength to improve the patients ability to perform daily activities with decreased pain and symptom levels     30 min Neuromuscular Re-education:  [x]  See flow sheet : cupping to left post med with seated left UE reach and standing post med   Rationale: increase strength, improve coordination and increase proprioception  to improve the patients ability to perform daily activities with decreased pain and symptom levels    12 min Manual Therapy:  MILAD infraclavicular rib pumping with active breath, cervical distraction, STM to right UT and SCM, lady in glasses with manual left posterior hip capsule stretch    Rationale: decrease pain, increase ROM and increase tissue extensibility to improve the patients ability to perform daily activities with decreased pain and symptom levels  The manual therapy interventions were performed at a separate and distinct time from the therapeutic activities interventions.     With   [] TE   [] TA   [] neuro   [] other: Patient Education: [x] Review HEP    [] Progressed/Changed HEP based on:   [] positioning   [] body mechanics   [] transfers   [] heat/ice application    [] other:      Other Objective/Functional Measures:  Unable to engage lower trap in standing with SA punch with shoulder flexion  Decreased right UT pain with standing post med      Pain Level (0-10 scale) post treatment: 1    ASSESSMENT/Changes in Function: Pt continues to be challenged with lower trap recruitment with inability to complete standing with scaption however improved in supine with TB abduction. Responded well with standing post med today with cupping to left post med to decrease right UT pain and hypersensitivity. Patient will continue to benefit from skilled PT services to modify and progress therapeutic interventions, address functional mobility deficits, address ROM deficits, address strength deficits, analyze and address soft tissue restrictions, analyze and cue movement patterns, analyze and modify body mechanics/ergonomics, assess and modify postural abnormalities and instruct in home and community integration to attain remaining goals. [x]  See Plan of Care  []  See progress note/recertification  []  See Discharge Summary         Progress towards goals / Updated goals:  Short Term Goals: STG- To be accomplished in 5 treatment(s):  1.  Pt will be compliant with HEP to encourage prophylaxis.   Eval:dispensed new exercise in addition to HEP from shoulder rehab  Last PN: compliance, overview today progressing 1/6/22  Current: MET 2/3/22     Long Term Goals: LTG- To be accomplished in 12 treatment(s):  1.  Pt will have decreased sensitivity in UT to allow to wear bra a full day without having to change garments multiple times a day to increase tolerance to daily activities an improve quality of life. .  Eval:has to change bra 2-4 times a day   Last PN: has to change 1-3 times per day  progressing 2/1/22  Current: no change since last PN slow progress 2/8/22     2.  Pt will be able to reach overhead and lower repeatedly without right scapular clunking and clicking and good scapular control to indicate functional scapular stability and increase tolerance to daily and overhead activities .  Eval:Scapular Rhythm:marked  dyskinesia and winging bilaterally, small cracking at right shoulder past 90*  Last PN: Progressing 9/1/06 continued clicking, especially overhead, was recently able to perform 4 sets of >10 floor presses with minimal clicking,  Recently very challenged with standing lower trap exercises.      3.  Pt improve cervical ROM to [facility] in all directions to increase tolerance to daily activities. Eval:  AROM Right Left   Cervical flex: 32       Ext: 40       Lat Flex 35 24   Rot 55 54      Last PN: Progressing 1/28/22   AROM Right Left   Lat Flex 36 34   Rot 60 58         4.  Pt FOTO score will increase by >15 points to show improvement in function. Eval:55  Last PN: Progressing 2/1/22 64     5. Pt will report being able to sit and work at desk and computer for 60 min or more to allow to perform job related activities.   Eval: pain with computer use and unable to sit at desk to work.   Last PN: education to switch other monitor to left versus right side with mild success but continued pain with increased sitting past 30 min  Progressing 2/1/22  Current: pt reporting some relief with shifting monitor to left side progressing 2/8/22           PLAN  []  Upgrade activities as tolerated     [x]  Continue plan of care  []  Update interventions per flow sheet       []  Discharge due to:_  []  Other:_      Delon Barthel 2/8/2022  11:38 AM    Future Appointments   Date Time Provider Juliette Ames   2/10/2022 11:30 AM Adolfo Mota [facility]   2/15/2022 11:30 AM Adolfo Mota [facility]

## 2022-02-10 ENCOUNTER — HOSPITAL ENCOUNTER (OUTPATIENT)
Dept: PHYSICAL THERAPY | Age: 42
Discharge: HOME | End: 2022-02-10
Payer: COMMERCIAL

## 2022-02-10 PROCEDURE — 97140 MANUAL THERAPY 1/> REGIONS: CPT

## 2022-02-10 PROCEDURE — 97110 THERAPEUTIC EXERCISES: CPT

## 2022-02-10 PROCEDURE — 97112 NEUROMUSCULAR REEDUCATION: CPT

## 2022-02-10 NOTE — PROGRESS NOTES
PT DAILY TREATMENT NOTE    Patient Name: Maria Luz Rodas  Date:2/10/2022  : 1980  [x]  Patient  Verified  Payor: Ryann Mathew / Plan: Azael Rogers RPN / Product Type: Commerical /    In time:11:34  Out time:12:22  Total Treatment Time (min): 48  Total Timed Codes (min): 48  1:1 Treatment Time (MC/BCBS only): 48   Visit #: 8 of 13    Treatment Dx: Neck pain [M54.2]    SUBJECTIVE  Pain Level (0-10 scale): 3  Any medication changes, allergies to medications, adverse drug reactions, diagnosis change, or new procedure performed?: [x] No    [] Yes (see summary sheet for update)  Subjective functional status/changes:   [] No changes reported  \"Nagging. I was able to do chest press on incline yesterday without any pain in the right shoulder. \"    OBJECTIVE    13 min Therapeutic Exercise:  [x] See flow sheet :   Rationale: increase ROM and increase strength to improve the patients ability to perform daily activities with decreased pain and symptom levels     25 min Neuromuscular Re-education:  [x]  See flow sheet :   Rationale: increase strength, improve coordination, improve balance and increase proprioception  to improve the patients ability to perform daily activities with decreased pain and symptom levels    10 min Manual Therapy:  MILAD infraclavicular rib pumping with active breath, cervical distraction, lady in glasses with manual left posterior hip capsule stretch and right SA punch with cupping to right lat line   Rationale: decrease pain, increase ROM and increase tissue extensibility to improve the patients ability to perform daily activities with decreased pain and symptom levels  The manual therapy interventions were performed at a separate and distinct time from the therapeutic activities interventions.     With   [] TE   [] TA   [] neuro   [] other: Patient Education: [x] Review HEP    [] Progressed/Changed HEP based on:   [] positioning   [] body mechanics   [] transfers   [] heat/ice application [] other:      Other Objective/Functional Measures: HGIR: 90* bilaterally post session  Fatigue with supine lower trap with TB  Anterior neck tightness with standing alternating respiratory reach     Pain Level (0-10 scale) post treatment: 0    ASSESSMENT/Changes in Function: Pt tolerated session well with reporting no pain post session. Pt continues to c/o right UT hypersensitivity however able to decrease some with exercises and wearing mens tank top versus bra. Good tolerance to post med exercises with increase awareness of where air is going and no UT compensation. Patient will continue to benefit from skilled PT services to modify and progress therapeutic interventions, address functional mobility deficits, address ROM deficits, address strength deficits, analyze and address soft tissue restrictions, analyze and cue movement patterns, analyze and modify body mechanics/ergonomics, assess and modify postural abnormalities and instruct in home and community integration to attain remaining goals. [x]  See Plan of Care  []  See progress note/recertification  []  See Discharge Summary         Progress towards goals / Updated goals:  Short Term Goals: STG- To be accomplished in 5 treatment(s):  1.  Pt will be compliant with HEP to encourage prophylaxis.   Eval:dispensed new exercise in addition to HEP from shoulder rehab  Last PN: compliance, overview today progressing 1/6/22  Current: MET 2/3/22     Long Term Goals: LTG- To be accomplished in 12 treatment(s):  1.  Pt will have decreased sensitivity in UT to allow to wear bra a full day without having to change garments multiple times a day to increase tolerance to daily activities an improve quality of life. .  Eval:has to change bra 2-4 times a day   Last PN: has to change 1-3 times per day  progressing 2/1/22  Current: no change since last PN slow progress 2/8/22     2.  Pt will be able to reach overhead and lower repeatedly without right scapular clunking and clicking and good scapular control to indicate functional scapular stability and increase tolerance to daily and overhead activities .  Eval:Scapular Rhythm:marked  dyskinesia and winging bilaterally, small cracking at right shoulder past 90*  Last PN: Progressing 8/3/67 continued clicking, especially overhead, was recently able to perform 4 sets of >10 floor presses with minimal clicking,  Recently very challenged with standing lower trap exercises.      3.  Pt improve cervical ROM to [facility] in all directions to increase tolerance to daily activities. Eval:  AROM Right Left   Cervical flex: 32       Ext: 40       Lat Flex 35 24   Rot 55 54      Last PN: Progressing 1/28/22   AROM Right Left   Lat Flex 36 34   Rot 60 58         4.  Pt FOTO score will increase by >15 points to show improvement in function. Eval:55  Last PN: Progressing 2/1/22 64     5. Pt will report being able to sit and work at desk and computer for 60 min or more to allow to perform job related activities.   Eval: pain with computer use and unable to sit at desk to work.   Last PN: education to switch other monitor to left versus right side with mild success but continued pain with increased sitting past 30 min  Progressing 2/1/22  Current: pt reporting some relief with shifting monitor to left side progressing 2/8/22        PLAN  []  Upgrade activities as tolerated     [x]  Continue plan of care  []  Update interventions per flow sheet       []  Discharge due to:_  []  Other:_      Alma Delia Mota 2/10/2022  9:50 AM    Future Appointments   Date Time Provider [place]   2/10/2022 11:30 AM Alma Delia Mota [facility]   2/15/2022 11:30 AM Alma Delia Mota [facility]

## 2022-02-15 ENCOUNTER — HOSPITAL ENCOUNTER (OUTPATIENT)
Dept: PHYSICAL THERAPY | Age: 42
Discharge: HOME | End: 2022-02-15
Payer: COMMERCIAL

## 2022-02-15 PROCEDURE — 97140 MANUAL THERAPY 1/> REGIONS: CPT

## 2022-02-15 PROCEDURE — 97110 THERAPEUTIC EXERCISES: CPT

## 2022-02-15 PROCEDURE — 97112 NEUROMUSCULAR REEDUCATION: CPT

## 2022-02-15 NOTE — PROGRESS NOTES
PT DAILY TREATMENT NOTE    Patient Name: Doug Arshad  Date:2/15/2022  : 1980  [x]  Patient  Verified  Payor: Philip Barrera / Plan: Tania Diane RPN / Product Type: Commerical /    In time:11:36  Out time:12:30  Total Treatment Time (min): 54  Total Timed Codes (min): 54  1:1 Treatment Time (MC/BCBS only): 54   Visit #: 9 of 13    Treatment Dx: Neck pain [M54.2]    SUBJECTIVE  Pain Level (0-10 scale): 2  Any medication changes, allergies to medications, adverse drug reactions, diagnosis change, or new procedure performed?: [x] No    [] Yes (see summary sheet for update)  Subjective functional status/changes:   [] No changes reported  \"Not too bad. The exercises are helping and I can really feel my lower trap now. \"    OBJECTIVE      10 min Therapeutic Exercise:  [x] See flow sheet :   Rationale: increase ROM and increase strength to improve the patients ability to perform daily activities with decreased pain and symptom levels     34 min Neuromuscular Re-education:  [x]  See flow sheet :   Rationale: increase strength, improve coordination, improve balance and increase proprioception  to improve the patients ability to perform daily activities with decreased pain and symptom levels    10 min Manual Therapy:  STM to right UT, SCM, left scalenes, gentle cervical distraction, infraclavicular rib pump with verbal consent for hand placement    Rationale: decrease pain, increase ROM and increase tissue extensibility to improve the patients ability to perform daily activities with decreased pain and symptom levels  The manual therapy interventions were performed at a separate and distinct time from the therapeutic activities interventions.           With   [] TE   [] TA   [] neuro   [] other: Patient Education: [x] Review HEP    [] Progressed/Changed HEP based on:   [] positioning   [] body mechanics   [] transfers   [] heat/ice application    [] other:      Other Objective/Functional Measures:     MILAD Special Tests (pre/post)  HGIR:                                                 Right: 65*/80*             Left: 80*/80*  Trunk Rot                                           Right: 13.5in/13in    Left 13in /13in    No UT compensation with standing SA reach at wall        Pain Level (0-10 scale) post treatment: 0    ASSESSMENT/Changes in Function: Pt tolerated session well with reporting no pain post session. Pt continues to respond well to post mediastinum expansion exercises to decrease right UT pain and hypersensitivity. Pt able to complete stand resisted SA reach today without UT compensation. Patient will continue to benefit from skilled PT services to modify and progress therapeutic interventions, address functional mobility deficits, address ROM deficits, address strength deficits, analyze and address soft tissue restrictions, analyze and cue movement patterns, analyze and modify body mechanics/ergonomics, assess and modify postural abnormalities and instruct in home and community integration to attain remaining goals. [x]  See Plan of Care  []  See progress note/recertification  []  See Discharge Summary         Progress towards goals / Updated goals:  Short Term Goals: STG- To be accomplished in 5 treatment(s):  1.  Pt will be compliant with HEP to encourage prophylaxis.   Eval:dispensed new exercise in addition to HEP from shoulder rehab  Last PN: compliance, overview today progressing 1/6/22  Current: MET 2/3/22     Long Term Goals: LTG- To be accomplished in 12 treatment(s):  1.  Pt will have decreased sensitivity in UT to allow to wear bra a full day without having to change garments multiple times a day to increase tolerance to daily activities an improve quality of life. .  Eval:has to change bra 2-4 times a day   Last PN: has to change 1-3 times per day  progressing 2/1/22  Current: able to decrease hypersensibility with stand post med however still having to change bras progressing 2/15/22     2.  Pt will be able to reach overhead and lower repeatedly without right scapular clunking and clicking and good scapular control to indicate functional scapular stability and increase tolerance to daily and overhead activities .  Eval:Scapular Rhythm:marked  dyskinesia and winging bilaterally, small cracking at right shoulder past 90*  Last PN: Progressing 7/7/50 continued clicking, especially overhead, was recently able to perform 4 sets of >10 floor presses with minimal clicking,  Recently very challenged with standing lower trap exercises. Current: pt reporting decreasing clicking with single arm presses now progressing 2/15/22      3.  Pt improve cervical ROM to [facility] in all directions to increase tolerance to daily activities. Eval:  AROM Right Left   Cervical flex: 32       Ext: 40       Lat Flex 35 24   Rot 55 54      Last PN: Progressing 1/28/22   AROM Right Left   Lat Flex 36 34   Rot 60 58         4.  Pt FOTO score will increase by >15 points to show improvement in function. Eval:55  Last PN: Progressing 2/1/22 64     5. Pt will report being able to sit and work at desk and computer for 60 min or more to allow to perform job related activities. Eval: pain with computer use and unable to sit at desk to work.   Last PN: education to switch other monitor to left versus right side with mild success but continued pain with increased sitting past 30 min  Progressing 2/1/22  Current: pt reporting some relief with shifting monitor to left side progressing 2/8/22        PLAN  []  Upgrade activities as tolerated     [x]  Continue plan of care  []  Update interventions per flow sheet       []  Discharge due to:_  []  Other:_      Antonio Schumacher 2/15/2022  11:37 AM    No future appointments.

## 2022-02-17 ENCOUNTER — HOSPITAL ENCOUNTER (OUTPATIENT)
Dept: PHYSICAL THERAPY | Age: 42
Discharge: HOME | End: 2022-02-17
Payer: COMMERCIAL

## 2022-02-17 PROCEDURE — 97140 MANUAL THERAPY 1/> REGIONS: CPT

## 2022-02-17 PROCEDURE — 97530 THERAPEUTIC ACTIVITIES: CPT

## 2022-02-17 PROCEDURE — 97112 NEUROMUSCULAR REEDUCATION: CPT

## 2022-02-17 NOTE — PROGRESS NOTES
PT DAILY TREATMENT NOTE    Patient Name: Harsh [place]  Date:2022  : 1980  [x]  Patient  Verified  Payor: Dmitriy Sexton / Plan: Lizett Bergeron RPN / Product Type: Commerical /    In time:10:54 am  Out time:11:49  Total Treatment Time (min): 55  Total Timed Codes (min): 55  Visit #: 10 of 13    Treatment Dx: Neck pain [M54.2]    SUBJECTIVE  Pain Level (0-10 scale): 2  Any medication changes, allergies to medications, adverse drug reactions, diagnosis change, or new procedure performed?: [x] No    [] Yes (see summary sheet for update)  Subjective functional status/changes:   [] No changes reported  \"Nothing to crazy. \"  Reports likes the standing lower trap posterior mediastinum activity.    Continues to have increased sensitivity at right UT - bra changes 2x per day    OBJECTIVE    10 min Therapeutic Activity:  [x]  See flow sheet :   Rationale: increase ROM, increase strength, improve coordination and increase proprioception  to improve the patients ability to perform daily activities with decreased pain and symptom levels       30 min Neuromuscular Re-education:  [x]  See flow sheet :vacuum cupping in long sitting alternating reach    Rationale: increase ROM, increase strength, improve coordination and increase proprioception  to improve the patients ability to perform daily activities with decreased pain and symptom levels      15 min Manual Therapy:  MILAD AIC correction, Sternal mobilization with active pelvic tilt, Superior T4 (MILAD technique), Right subclavius release (MILAD technique), MILAD infraclavicular rib pump with active breath   Cervical distraction  STM to bilateral SCM and scalenes  Obtained consent for hand placement      Rationale: decrease pain, increase ROM, increase tissue extensibility, decrease trigger points and increase postural awareness to improve the patients ability to perform daily activities with decreased pain and symptom levels    The manual therapy interventions were performed at a separate and distinct time from the therapeutic activities interventions. With   [] TE   [] TA   [] neuro   [] other: Patient Education: [x] Review HEP    [] Progressed/Changed HEP based on:   [] positioning   [] body mechanics   [] transfers   [] heat/ice application    [] other:      Other Objective/Functional Measures:   MILAD Special Tests (pre/post)  HGIR:                                                 Right: 64/85             Left: 72/90  Hip Add Drop Test:                           Right: +/-            Left:+/-  Trunk Rot                                           Right: 15 in/13 in Left 15 in /13 in  Shoulder Horizontal Abduction          Right: 36 /NT             Left: 45 /NT          Pain Level (0-10 scale) post treatment: 0-1    ASSESSMENT/Changes in Function:   Added standing serratus squat this session and left S/L Right crossover glut with left adduction to address pelvis instability. Appeared to respond very well to interventions. Continues to have very flat thoracic spine. Was challenged with serratus squat and not shoulder hiking. Reports compliance daily with standing lower trap post med expansion. Patient will continue to benefit from skilled PT services to modify and progress therapeutic interventions, address functional mobility deficits, address ROM deficits, address strength deficits, analyze and address soft tissue restrictions, analyze and cue movement patterns, analyze and modify body mechanics/ergonomics, assess and modify postural abnormalities and instruct in home and community integration to attain remaining goals. [x]  See Plan of Care  []  See progress note/recertification  []  See Discharge Summary         Progress towards goals / Updated goals:  Short Term Goals: STG- To be accomplished in 5 treatment(s):  1.  Pt will be compliant with HEP to encourage prophylaxis.   Eval:dispensed new exercise in addition to HEP from shoulder rehab  Last PN: compliance, overview today progressing 1/6/22  Current: MET 2/3/22     Long Term Goals: LTG- To be accomplished in 12 treatment(s):  1.  Pt will have decreased sensitivity in UT to allow to wear bra a full day without having to change garments multiple times a day to increase tolerance to daily activities an improve quality of life. .  Eval:has to change bra 2-4 times a day   Last PN: has to change 1-3 times per day  progressing 2/1/22  Current: able to decrease hypersensibility with stand post med however still having to change bras ~2x per day progressing 2/17/22     2.  Pt will be able to reach overhead and lower repeatedly without right scapular clunking and clicking and good scapular control to indicate functional scapular stability and increase tolerance to daily and overhead activities .  Eval:Scapular Rhythm:marked  dyskinesia and winging bilaterally, small cracking at right shoulder past 90*  Last PN: Progressing 4/9/53 continued clicking, especially overhead, was recently able to perform 4 sets of >10 floor presses with minimal clicking,  Recently very challenged with standing lower trap exercises. Current: pt reporting decreasing clicking with single arm presses now progressing 2/15/22      3.  Pt improve cervical ROM to [facility] in all directions to increase tolerance to daily activities. Eval:  AROM Right Left   Cervical flex: 32       Ext: 40       Lat Flex 35 24   Rot 55 54      Last PN: Progressing 1/28/22   AROM Right Left   Lat Flex 36 34   Rot 60 58         4.  Pt FOTO score will increase by >15 points to show improvement in function. Eval:55  Last PN: Progressing 2/1/22 64     5. Pt will report being able to sit and work at desk and computer for 60 min or more to allow to perform job related activities.   Eval: pain with computer use and unable to sit at desk to work.   Last PN: education to switch other monitor to left versus right side with mild success but continued pain with increased sitting past 30 min  Progressing 2/1/22  Current: pt reporting some relief with shifting monitor to left side challenged with laptop use progressing 2/17/22        PLAN  []  Upgrade activities as tolerated     [x]  Continue plan of care  []  Update interventions per flow sheet       []  Discharge due to:_  []  Other:_      Lj Lomas, PT, DPT 2/17/2022  10:55 AM    No future appointments.

## 2022-02-21 ENCOUNTER — HOSPITAL ENCOUNTER (OUTPATIENT)
Dept: PHYSICAL THERAPY | Age: 42
Discharge: HOME | End: 2022-02-21
Payer: COMMERCIAL

## 2022-02-21 PROCEDURE — 97530 THERAPEUTIC ACTIVITIES: CPT

## 2022-02-21 PROCEDURE — 97112 NEUROMUSCULAR REEDUCATION: CPT

## 2022-02-21 NOTE — PROGRESS NOTES
[facility] at the [address], [place], [address] [facility]  Phone: [phone]      Fax:  [phone]    Progress Note  Patient name: Kavon Cheng Start of Care: 22   Referral source: JENNIFER Ordoñez : 1980   Medical/Treatment Diagnosis: Neck pain [M54.2] Onset Date:Exacerbation over last 6 months         Prior Hospitalization: see medical history Provider#: [id number]   Medications: Verified on Patient Summary List    ComorbiditiesEllers danlos, Hx of back pain, depression, HA, previous surgeries, recent right shoulder RCR, carpal tunnel release and cubital tunnel release    Prior Level of Function: Reports regular headaches during the week, Pain with: wearing bra - has to change regularly, typing, computer use, sitting at desk, right shoulder/scapular clicking and clunking with reaching overhead.      Visits from Start of Care: 11    Missed Visits: 0    Progress Towards Goals:   Short Term Goals: STG- To be accomplished in 5 treatment(s):  1.  Pt will be compliant with HEP to encourage prophylaxis.   Eval:dispensed new exercise in addition to HEP from shoulder rehab  Last PN: compliance, overview today progressing 22  Current: MET 2/3/22     Long Term Goals: LTG- To be accomplished in 12 treatment(s):  1.  Pt will have decreased sensitivity in UT to allow to wear bra a full day without having to change garments multiple times a day to increase tolerance to daily activities an improve quality of life. .  Eval:has to change bra 2-4 times a day   Last PN: has to change 1-3 times per day  progressing 22  Current:  at least 2x day changing bras, able to wear mens tank tops without symptoms slow progress 22     2.  Pt will be able to reach overhead and lower repeatedly without right scapular clunking and clicking and good scapular control to indicate functional scapular stability and increase tolerance to daily and overhead activities .  Eval:Scapular Rhythm:marked  dyskinesia and winging bilaterally, small cracking at right shoulder past 90*  Last PN: Progressing 8/8/81 continued clicking, especially overhead, was recently able to perform 4 sets of >10 floor presses with minimal clicking,  Recently very challenged with standing lower trap exercises. Current: still getting clicking with reaching overhead slow progress 2/21/22      3.  Pt improve cervical ROM to [facility] in all directions to increase tolerance to daily activities. Eval:  AROM Right Left   Cervical flex: 32       Ext: 40       Lat Flex 35 24   Rot 55 54      Last PN: Progressing 1/28/22   AROM Right Left   Lat Flex 36 34   Rot 60 58      Current:  AROM Right Left   Lat Flex 32* 42*   Rot 68* 80*         4.  Pt FOTO score will increase by >15 points to show improvement in function. Eval:55  Last PN: Progressing 2/1/22 64  Current: 66 progressing 2/21/22     5. Pt will report being able to sit and work at desk and computer for 60 min or more to allow to perform job related activities. Eval: pain with computer use and unable to sit at desk to work.   Last PN: education to switch other monitor to left versus right side with mild success but continued pain with increased sitting past 30 min  Progressing 2/1/22  Current: pt reporting some relief with shifting monitor to left however still having pain with looking down towards laptopprogressing 2/21/22        Key Functional Changes: Pt has been seen for 11 visits with C/C of cervical pain and significant hypersensitivity along UT limiting tolerance to some clothing. Pt has been making progress towards goals with improving ROM and MILAD measures however no change in right UT hypersensitivity with needing to change bras at least 2x day. Pt reports pain has been ongoing but has progressed of last several months with pain in right UT and anterior shoulder.  Cervical ROM is improving however still limited in right SB and rotation with UT compensation noted. Pt noted to have patho pelvis with MILAD tests and measures however able to improve trunk rotation, and hip add lift test with posterior capsule inhibition techniques and less clicking in right shoulder noted post session with repeated presses. Suspect possibly lumbopelvic dysfunction contributing to continued pain due to instability in stance and unable to use right glut max with pressing activities on right. Pt would benefit from continued skilled PT services to address remaining deficits above and goals below.      Updated Goals: to be achieved in 4 weeks:   See goals above    ASSESSMENT/RECOMMENDATIONS:  [x]Continue therapy per initial plan/protocol at a frequency of  2 x per week for 4 weeks  []Continue therapy with the following recommended changes:_____________________      _____________________________________________________________________  []Discontinue therapy progressing towards or have reached established goals  []Discontinue therapy due to lack of appreciable progress towards goals  []Discontinue therapy due to lack of attendance or compliance  []Await Physician's recommendations/decisions regarding therapy  []Other:________________________________________________________________    Thank you for this referral.   Rangel Mota 2/21/2022 3:32 PM

## 2022-02-21 NOTE — PROGRESS NOTES
PT DAILY TREATMENT NOTE    Patient Name: Kristie Joe  Date:2022  : 1980  [x]  Patient  Verified  Payor: Ruddy Peralta / Plan: Irene Coy RPN / Product Type: Commerical /    In time:10:50  Out time:11:40  Total Treatment Time (min): 50  Total Timed Codes (min): 50  1:1 Treatment Time (MC/BCBS only): 50   Visit #: 11  of 13    Treatment Dx: Neck pain [M54.2]    SUBJECTIVE  Pain Level (0-10 scale): 3  Any medication changes, allergies to medications, adverse drug reactions, diagnosis change, or new procedure performed?: [x] No    [] Yes (see summary sheet for update)  Subjective functional status/changes:   [] No changes reported  \"MRI is tomorrow. Really tight yesterday. Didn;t work out Sat and did yoga all day yesterday so I thought it would be great. \"    OBJECTIVE    10 min Therapeutic Activity:  [x]  See flow sheet :   Rationale: increase strength, improve coordination and increase proprioception  to improve the patients ability to perform daily activities with decreased pain and symptom levels     40 min Neuromuscular Re-education:  [x]  See flow sheet :   Rationale: increase ROM, increase strength, improve coordination and increase proprioception  to improve the patients ability to perform daily activities with decreased pain and symptom levels          With   [] TE   [] TA   [] neuro   [] other: Patient Education: [x] Review HEP    [] Progressed/Changed HEP based on:   [] positioning   [] body mechanics   [] transfers   [] heat/ice application    [] other:      Other Objective/Functional Measures: MILAD Special Tests (pre/post)  HGIR:                                                 Right: 65*/73*             Left: 68*/68*  Shoulder Flexion   Right: 170*/             Left: WFL/  Hip Add Drop Test:                           Right: -/NT            Left:-/NT  Trunk Rot                                           Right: 15in/13in    Left 13.5 /13in    SLR - move movement right > left   ADD lift Grade 0 left, grade 1 right - improvement 1 level each post session   ABD lift: level 2 bilaterally    Pain Level (0-10 scale) post treatment: 2    ASSESSMENT/Changes in Function: Pt has been seen for 11 visits with C/C of cervical pain and significant hypersensitivity along UT limiting tolerance to some clothing. Pt has been making progress towards goals with improving ROM and MILAD measures however no change in right UT hypersensitivity with needing to change bras at least 2x day. Pt reports pain has been ongoing but has progressed of last several months with pain in right UT and anterior shoulder. Cervical ROM is improving however still limited in right SB and rotation with UT compensation noted. Pt noted to have patho pelvis with MILAD tests and measures however able to improve trunk rotation, and hip add lift test with posterior capsule inhibition techniques and less clicking in right shoulder noted post session with repeated presses. Suspect possibly lumbopelvic dysfunction contributing to continued pain due to instability in stance and unable to use right glut max with pressing activities on right. Pt would benefit from continued skilled PT services to address remaining deficits above and goals below. Patient will continue to benefit from skilled PT services to modify and progress therapeutic interventions, address functional mobility deficits, address ROM deficits, address strength deficits, analyze and address soft tissue restrictions, analyze and cue movement patterns, analyze and modify body mechanics/ergonomics, assess and modify postural abnormalities and instruct in home and community integration to attain remaining goals. [x]  See Plan of Care  []  See progress note/recertification  []  See Discharge Summary         Progress towards goals / Updated goals:  Short Term Goals: STG- To be accomplished in 5 treatment(s):  1.  Pt will be compliant with HEP to encourage prophylaxis.   Eval:dispensed new exercise in addition to HEP from shoulder rehab  Last PN: compliance, overview today progressing 1/6/22  Current: MET 2/3/22     Long Term Goals: LTG- To be accomplished in 12 treatment(s):  1.  Pt will have decreased sensitivity in UT to allow to wear bra a full day without having to change garments multiple times a day to increase tolerance to daily activities an improve quality of life. .  Eval:has to change bra 2-4 times a day   Last PN: has to change 1-3 times per day  progressing 2/1/22  Current:  at least 2x day changing bras, able to wear mens tank tops without symptoms slow progress 2/21/22     2.  Pt will be able to reach overhead and lower repeatedly without right scapular clunking and clicking and good scapular control to indicate functional scapular stability and increase tolerance to daily and overhead activities .  Eval:Scapular Rhythm:marked  dyskinesia and winging bilaterally, small cracking at right shoulder past 90*  Last PN: Progressing 6/7/33 continued clicking, especially overhead, was recently able to perform 4 sets of >10 floor presses with minimal clicking,  Recently very challenged with standing lower trap exercises. Current: still getting clicking with reaching overhead slow progress 2/21/22      3.  Pt improve cervical ROM to [facility] in all directions to increase tolerance to daily activities. Eval:  AROM Right Left   Cervical flex: 32       Ext: 40       Lat Flex 35 24   Rot 55 54      Last PN: Progressing 1/28/22   AROM Right Left   Lat Flex 36 34   Rot 60 58      Current:  AROM Right Left   Lat Flex 32* 42*   Rot 68* 80*         4.  Pt FOTO score will increase by >15 points to show improvement in function. Eval:55  Last PN: Progressing 2/1/22 64  Current:      5. Pt will report being able to sit and work at desk and computer for 60 min or more to allow to perform job related activities.   Eval: pain with computer use and unable to sit at desk to work.   Last PN: education to switch other monitor to left versus right side with mild success but continued pain with increased sitting past 30 min  Progressing 2/1/22  Current: pt reporting some relief with shifting monitor to left however still having pain with looking down towards laptopprogressing 2/21/22        PLAN  []  Upgrade activities as tolerated     [x]  Continue plan of care  []  Update interventions per flow sheet       []  Discharge due to:_  []  Other:_      Florentin Keita 2/21/2022  10:52 AM    No future appointments.

## 2022-03-16 ENCOUNTER — TELEPHONE (OUTPATIENT)
Dept: PHYSICAL THERAPY | Age: 42
End: 2022-03-16

## 2022-03-18 PROBLEM — G56.01 CARPAL TUNNEL SYNDROME OF RIGHT WRIST: Status: ACTIVE | Noted: 2021-02-23

## 2022-03-18 PROBLEM — M43.06 SPONDYLOLYSIS OF LUMBAR REGION: Status: ACTIVE | Noted: 2017-10-20

## 2022-03-19 PROBLEM — K21.9 GERD (GASTROESOPHAGEAL REFLUX DISEASE): Status: ACTIVE | Noted: 2021-02-23

## 2022-03-19 PROBLEM — G56.21 CUBITAL TUNNEL SYNDROME ON RIGHT: Status: ACTIVE | Noted: 2021-02-23

## 2022-03-19 PROBLEM — M43.16 SPONDYLOLISTHESIS OF LUMBAR REGION: Status: ACTIVE | Noted: 2017-10-20

## 2022-03-19 PROBLEM — M75.111 INCOMPLETE TEAR OF RIGHT ROTATOR CUFF: Status: ACTIVE | Noted: 2021-03-23

## 2022-03-19 PROBLEM — Q79.60 EDS (EHLERS-DANLOS SYNDROME): Status: ACTIVE | Noted: 2021-02-23

## 2022-03-20 PROBLEM — M51.36 DDD (DEGENERATIVE DISC DISEASE), LUMBAR: Status: ACTIVE | Noted: 2017-10-17

## 2022-03-20 PROBLEM — I73.00 RAYNAUD'S PHENOMENON: Status: ACTIVE | Noted: 2021-02-23

## 2022-03-20 PROBLEM — F41.9 ANXIETY: Status: ACTIVE | Noted: 2021-02-23

## 2022-04-20 ENCOUNTER — HOSPITAL ENCOUNTER (OUTPATIENT)
Dept: PHYSICAL THERAPY | Age: 42
Discharge: HOME | End: 2022-04-20
Payer: COMMERCIAL

## 2022-04-20 PROCEDURE — 97162 PT EVAL MOD COMPLEX 30 MIN: CPT

## 2022-04-20 PROCEDURE — 97530 THERAPEUTIC ACTIVITIES: CPT

## 2022-04-20 NOTE — PROGRESS NOTES
PT DAILY TREATMENT NOTE/CERVICAL [id number]    Patient Name: Shaan Robert  Date:2022  : 1980  [x]  Patient  Verified  Payor: Lilli Lim / Plan: Tung Alan RPN / Product Type: Commerical /    In time:11:38  Out time:12:28  Total Treatment Time (min): 50  Visit #: 1 of 11    Treatment Area: Cervicalgia [M54.2]  Radiculopathy, cervical region [M54.12]  DDD (degenerative disc disease), cervical [M50.30]    SUBJECTIVE  Pain Level (0-10 scale): 5  [x]constant []intermittent [x]improving []worsening []no change since onset    Any medication changes, allergies to medications, adverse drug reactions, diagnosis change, or new procedure performed?: [x] No    [] Yes (see summary sheet for update)  Subjective functional status/changes:     Mechanism of Injury: MD thinking needs to get more muscles and PT to help prolong cervical fusion. Pt reporting wanting to hold on cervical fusion as long as possible. Pt reporting increased pain since right shoulder surgery (3/25/21)  Current symptoms/Complaints: pt reporting acupuncture however stopped working last week with reporting increased pain since. Pt reporting last week doing invertred rows, continued hyerpsensitiy in right UT. Pain increases to 9/10 with being still too long, looking down, typing, unable to wear bra consistetntly. Pain decreases with [place] gel, rest, cupping to 2/10. Pt had MRI completed on cervical spine with results of encroachment. stenosis in C3-C6 and C6-7 moderated midline and left anterior canal left ventral nerve root encroachment due to posterior protruding disc osteophytes ; C3-4 moderate right, C4-5 high grade right, C5-6 high grade right, C6-7 high grade left modaerate right foraminal encroachments due to encroaching uncovertebral facet joint bony hypertrophy in combination with loss of disc height; multilevel DDD; right anterior C7 probable 9mm hemangioma versus subarticular erosion.    Previous Treatment/Compliance: yes  PMHx/Surgical Hx: collagenous colitis,  Ellers Danlos, multiple right shoulder surgeries, right carpal tunnel and cubital tunnl release, L-spine surgery for spondylolisthesis, right ankle dislocation   Work Hx: crossfit owner crossfit , nutrition couseling   Living Situation: lives with partner  Pt Goals: Liset Canas is to go at couple years without fusion. \"      OBJECTIVE/EXAMINATION     27 min [x]Eval                  []Re-Eval       23 min Therapeutic Activity:  []  See flow sheet :   Rationale: increase strength, improve coordination and increase proprioception  to improve the patients ability to perform daily activities with decreased pain and symptom levels        With   [] TE   [] TA   [] neuro   [] other: Patient Education: [x] Review HEP    [] Progressed/Changed HEP based on:   [] positioning   [] body mechanics   [] transfers   [] heat/ice application    [] other:      Other Objective/Functional Measures:     OBJECTIVE  Posture: [] WNL  Head Position:  C-Kyphosis:  [x] increased   [] decreased   C-Lordosis:   [] increased   [] decreased  T-Kyphosis:  [x] increased   [] decreased  T-Lordosis:   [] increased   [] decreased     Shoulder/Scapular Screen:     AROM               Strength    Right Left  Right  Left    Shoulder flexion  170* 180* 5 5   Shoulder * 180* 5 5   Biceps    5 5   Triceps    5 5   Shoulder IR 38* 55* 5 5   Shoulder ER 90* 90* 4 5   Rhomboids        Lower trap        Serratus          Active Movements: [] N/A   [] Too acute   [] Other:  Cervical  AROM PROM Comments:pain, area   Forward flexion 50*     Extension 50*     SB right 35*     SB left  40*     Rotation right 85*     Rotation left 85*       Trunk rotation: right 15.5in, left 15in    Palpation:  [] Min  [x] Mod  [] Severe    Location: right UT    Neuro Screen (myotome/dematome/felexes): [x] WNL  Reflexes left UE: 2+ C5, 1+ c6 and c7   Right : 1+ C5-7  Myotome Level Muscle Test Myotome Level Muscle Test   C5 Shoulder Adduction - Deltoid C8 Finger Flexors   C6 Wrist Extension T1 Finger Abduction - Interossei   C7 Elbow Extension     Comments:  Upper Limb Tension Tests: [] N/A       Ulnar: [] R    [] L    [] +    [] -       Median: [] R    [] L    [] +    [] -       Radial: [] R    [] L    [] +    [] -    Special Tests:  Cervical:        Vertebral Artery:  [] R    [] L    [] +    [] -       Alar Ligament: [] R    [] L    [] +    [x] -       Spurling's:  [] R    [] L    [] +    [x] -       Distraction:  [] R    [] L    [] +    [] -       Compression: [] R    [] L    [] +    [x] -    Muscle Flexibility: [] N/A   Scalenes: [] WNL    [] Tight    [] R    [] L   Upper Trap: [] WNL    [x] Tight    [x] R    [] L   Levator: [] WNL    [x] Tight    [x] R    [] L   Pect. Minor: [] WNL    [x] Tight    [x] R    [] L     Other tests/comments:   strength 80# right, 60# left   Hip add lift 1/5    Negative hip add drop bilaterally       Pain Level (0-10 scale) post treatment: 3    ASSESSMENT/Changes in Function: Pt is a 42yo right hand dominant female presenting to therapy with c/c right UT and neck pain ongoing since right shoulder surgery March 2021. Pt has attended PT here previously with ability to decrease symptoms some. Pt recently had MRI completed of cervical spine with demonstrating DDD, nerve encroachment and stenosis especially distal cervical vertebrae and posterior disc osteophytes. Pt reporting MD recommending cervical fusion however pt wanting to push surgery as far out as possible. Pt has history of elhers-danlos syndrome, lumbar fusion, right shoulder sx and right cubital/carpal tunnel release. 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 Pain increases to 9/10 with sitting for long periods, typing, reaching overhead, looking down and unable to wear bra consistently to right UT sensitivitiy.  Pain decreases to 2/10 with rest, cupping, CBD, [place] gel and rotating out bras and/or wearing mens tank top at times. Pt demonstrates WFl cervical ROM except right sidebending ROM, decreased bilateral trunk rotation and shoulder IR ROM ,lumbo pelvic dysfunction with 1/5 hip add lift scores bilaterally, diminished UE reflexes bilaterally right > left, pt reporting occasional radicular symptoms in right hand, WFL  strength, negative cervical compression and Spurling, and increased tone noted in right UT. Pt would benefit from skilled PT services to address the above impairments to allow pt to complete ADLs and job related duties with increased ease and less pain. Patient will continue to benefit from skilled PT services to modify and progress therapeutic interventions, address functional mobility deficits, address ROM deficits, address strength deficits, analyze and address soft tissue restrictions, analyze and cue movement patterns, analyze and modify body mechanics/ergonomics, assess and modify postural abnormalities and instruct in home and community integration to attain remaining goals. []  See Plan of Care  []  See progress note/recertification  []  See Discharge Summary         Progress towards goals / Updated goals:  Short Term Goals: STG- To be accomplished in 4 visit(s):  1. Pt will be independent with HEP to encourage prophylaxis. Eval: HEP dispensed     Long Term Goals: LTG- To be accomplished in 11 visit(s):  1. Pt will be able to sit > 1 hour typing with pain 1/10 or less to be able to perform job related activities with increased ease   Eval: 9/10 max pain     2. Pt will demonstrate 4/5 hip add lift score bilaterally to demonstrate improved pelvic stability for crossfit workouts especially overhead movements   Eval:1/5 bilaterally     3. Pt bilateral shoulder IR AROM will improve to > 70* to demonstrate improved thoracic mobility and increase ease with ADLs. Eval:right 38*, left 55*    4.   Pt FOTO score will increase by >/= 9 points to show improvement in subjective function. Eval:58  Current: will address at visit 5  *FOTO score is an established functional score where 100 = no disability*    5. Pt will enter clinic with trunk rotation 14in or less bilaterally to demonstrate carryover with HEP.   Eval: 15.5in right, 15in left       PLAN  []  Upgrade activities as tolerated     [x]  Continue plan of care  []  Update interventions per flow sheet       []  Discharge due to:_  []  Other:_      Windy Ferro 4/20/2022  11:11 AM

## 2022-04-20 NOTE — PROGRESS NOTES
[facility] at the [address], [place], [address] [facility]  Phone: [phone]      Fax:  [phone]             Plan of Care/ Statement of Necessity for Physical Therapy Services      Patient name: Emmett Mtz Start of Care: 2022   Referral source: Jero Cortes MD : 1980    Medical Diagnosis: Cervicalgia [M54.2]  Radiculopathy, cervical region [M54.12]  DDD (degenerative disc disease), cervical [M50.30]   Onset Date:chronic    Treatment Diagnosis: neck pain    Prior Hospitalization: see medical history Provider#: [id number]   Medications: Verified on Patient summary List    Comorbidities: collagenous colitis,  Ellers Danlos, multiple right shoulder surgeries, right carpal tunnel and cubital tunnl release, L-spine surgery for spondylolisthesis, right ankle dislocation    Prior Level of Function: crossfit owner,  and nutrition counselor, very active, full function without neck and right UT pain and hypersensitivity      The Plan of Care and following information is based on the information from the initial evaluation. Assessment/ key information: Pt is a 42yo right hand dominant female presenting to therapy with c/c right UT and neck pain ongoing since right shoulder surgery 2021. Pt has attended PT here previously with ability to decrease symptoms some. Pt recently had MRI completed of cervical spine with demonstrating DDD, nerve encroachment and stenosis especially distal cervical vertebrae and posterior disc osteophytes. Pt reporting MD recommending cervical fusion however pt wanting to push surgery as far out as possible. Pt has history of elhers-danlos syndrome, lumbar fusion, right shoulder sx and right cubital/carpal tunnel release. 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   Pain increases to 9/10 with sitting for long periods, typing, reaching overhead, looking down and unable to wear bra consistently to right UT sensitivitiy. Pain decreases to 2/10 with rest, cupping, CBD, [place] gel and rotating out bras and/or wearing mens tank top at times. Pt demonstrates WFl cervical ROM except right sidebending ROM, decreased bilateral trunk rotation and shoulder IR ROM ,lumbo pelvic dysfunction with 1/5 hip add lift scores bilaterally, diminished UE reflexes bilaterally right > left, pt reporting occasional radicular symptoms in right hand, WFL  strength, negative cervical compression and Spurling, and increased tone noted in right UT. Pt would benefit from skilled PT services to address the above impairments to allow pt to complete ADLs and job related duties with increased ease and less pain.      Evaluation Complexity History HIGH Complexity :3+ comorbidities / personal factors will impact the outcome/ POC ; Examination MEDIUM Complexity : 3 Standardized tests and measures addressing body structure, function, activity limitation and / or participation in recreation  ;Presentation MEDIUM Complexity : Evolving with changing characteristics  ; Clinical Decision Making MEDIUM Complexity : FOTO score of 26-74 FOTO score = an established functional score where 100 = no disability  Overall Complexity Rating: MEDIUM  Problem List: pain affecting function, decrease ROM, decrease strength, impaired gait/ balance, decrease ADL/ functional abilitiies, decrease activity tolerance and decrease flexibility/ joint mobility   Treatment Plan may include any combination of the following: Therapeutic exercise, Therapeutic activities, Neuromuscular re-education, Physical agent/modality, Manual therapy, Patient education, Self Care training and Functional mobility training  Patient / Family readiness to learn indicated by: asking questions, trying to perform skills and interest  Persons(s) to be included in education: patient (P)  Barriers to Learning/Limitations: None  Patient Goal (s): Gasper Storm is to go a couple years without fusion. \"  Patient Self Reported Health Status: excellent  Rehabilitation Potential: good    Short Term Goals: STG- To be accomplished in 4 visit(s):  1. Pt will be independent with HEP to encourage prophylaxis. Eval: HEP dispensed      Long Term Goals: LTG- To be accomplished in 11 visit(s):  1. Pt will be able to sit > 1 hour typing with pain 1/10 or less to be able to perform job related activities with increased ease   Eval: 9/10 max pain      2. Pt will demonstrate 4/5 hip add lift score bilaterally to demonstrate improved pelvic stability for crossfit workouts especially overhead movements   Eval:1/5 bilaterally      3. Pt bilateral shoulder IR AROM will improve to > 70* to demonstrate improved thoracic mobility and increase ease with ADLs. Eval:right 38*, left 55*     4.  Pt FOTO score will increase by >/= 9 points to show improvement in subjective function. Eval:58  Current: will address at visit 5  *FOTO score is an established functional score where 100 = no disability*     5. Pt will enter clinic with trunk rotation 14in or less bilaterally to demonstrate carryover with HEP. Eval: 15.5in right, 15in left     Frequency / Duration: Patient to be seen  11 treatments. Patient/ Caregiver education and instruction: Diagnosis, prognosis, self care, activity modification and exercises   [x]  Plan of care has been reviewed with PORTIA LIMON Remesic 4/20/2022 7:19 PM  _____________________________________________________________________  I certify that the above Therapy Services are being furnished while the patient is under my care. I agree with the treatment plan and certify that this therapy is necessary.     Physician's Signature:____________Date:_________TIME:________                                      Fer Harris MD    ** Signature, Date and Time must be completed for valid certification **    Please sign and return to [facility] at the [address], [facility], [address] [facility]       Phone: [phone]      Fax:  [phone]

## 2022-04-27 ENCOUNTER — HOSPITAL ENCOUNTER (OUTPATIENT)
Dept: PHYSICAL THERAPY | Age: 42
Discharge: HOME | End: 2022-04-27
Payer: COMMERCIAL

## 2022-04-27 PROCEDURE — 97112 NEUROMUSCULAR REEDUCATION: CPT

## 2022-04-27 PROCEDURE — 97140 MANUAL THERAPY 1/> REGIONS: CPT

## 2022-04-27 NOTE — PROGRESS NOTES
PT DAILY TREATMENT NOTE    Patient Name: Cyndi Marcial  Date:2022  : 1980  [x]  Patient  Verified  Payor: Jose Price / Plan: Thom Johnson RPN / Product Type: Commerical /    In time:215  Out time:300  Total Treatment Time (min): 45  Total Timed Codes (min): 45  1:1 Treatment Time (MC/BCBS only): 45   Visit #: 2 of 11    Treatment Dx: Cervicalgia [M54.2]  Radiculopathy, cervical region [M54.12]  DDD (degenerative disc disease), cervical [M50.30]    SUBJECTIVE  Pain Level (0-10 scale): 7-8  Any medication changes, allergies to medications, adverse drug reactions, diagnosis change, or new procedure performed?: [x] No    [] Yes (see summary sheet for update)  Subjective functional status/changes:   [] No changes reported  Patient reports marked neck pain right >left, UT tension and HA/temporal    OBJECTIVE       35 min Neuromuscular Re-education:  [x]  See flow sheet :focus on right SCM/UT inhibition   Rationale: increase ROM, increase strength, improve coordination, increase proprioception and rib cage/CS repositioning  to improve the patients ability to perform ADL    10 min Manual Therapy:   Sternal mobilization , Superior T4 (MILAD technique), Right subclavius release (MILAD technique), MILAD infraclavicular rib pump with active breath, decompression of cranial base/fronto occipital hold   Obtained consent for hand placement    Rationale: decrease pain, increase ROM, increase tissue extensibility and postrual alignment to improve the patients ability to perform ADL  The manual therapy interventions were performed at a separate and distinct time from the therapeutic activities interventions.             With   [] TE   [] TA   [x] neuro   [] other: Patient Education: [x] Review HEP    [] Progressed/Changed HEP based on:   [] positioning   [] body mechanics   [] transfers   [] heat/ice application    [] other:      Other Objective/Functional Measures:   MILAD Special Tests (pre/post)  HGIR: Right: 45/80             Left: 60/90  Hip Add Drop Test:                           Right: -/nt            Left:-/nt  Trunk Rot                                           Right: 14/nt    Left 14 /nt  CS Axial Rotation left <right/ symmetrical post session  Overactive SCM left <right, UT left <right, right pec  Lack of upper posterior mediastinum expansion with breathing ex, mostly using anterior upper chest with inhalation  TTP right SCM/temporal region         Pain Level (0-10 scale) post treatment: 3    ASSESSMENT/Changes in Function: patient highly engaged, appears to be working too hard causing accessory muscle activity with respiratory ex, difficulty to disengage SCM/UT and right pec. Also included TMJ resting position with current ex. Improvement in CS axial rotation and  HGIR ROM indicating improved CS/rib cage alignment post session with reduction in pain/tension and HA. Focused on right SCM/UT inhibition through alternating triceps work, manual techniques and kavya left stance activities. Advised patient to reduce effort with all exercises to reduce accessory muscle activity. Patient will continue to benefit from skilled PT services to analyze and address soft tissue restrictions and analyze and cue movement patterns to attain remaining goals. [x]  See Plan of Care  []  See progress note/recertification  []  See Discharge Summary         Progress towards goals / Updated goals:  Short Term Goals: STG- To be accomplished in 4 visit(s):  1.  Pt will be independent with HEP to encourage prophylaxis.   Eval: HEP dispensed      Long Term Goals: LTG- To be accomplished in 11 visit(s):  1.  Pt will be able to sit > 1 hour typing with pain 1/10 or less to be able to perform job related activities with increased ease   Eval: 9/10 max pain      2.  Pt will demonstrate 4/5 hip add lift score bilaterally to demonstrate improved pelvic stability for crossfit workouts especially overhead movements   Eval:1/5 bilaterally      3.  Pt bilateral shoulder IR AROM will improve to > 70* to demonstrate improved thoracic mobility and increase ease with ADLs. Eval:right 38*, left 55*     4.  Pt FOTO score will increase by >/= 9 points to show improvement in subjective function. Eval:58  Current: will address at visit 5  *FOTO score is an established functional score where 100 = no disability*     5. Pt will enter clinic with trunk rotation 14in or less bilaterally to demonstrate carryover with HEP.   Eval: 15.5in right, 15in left   Current status 4/27/22: trunk rotation 14 in bilaterally at beginning of session, MET       PLAN  []  Upgrade activities as tolerated     [x]  Continue plan of care  []  Update interventions per flow sheet       []  Discharge due to:_  []  Other:_      April Kareem PT 4/27/2022  2:04 PM    Future Appointments   Date Time Provider [place]te Ames   4/27/2022  2:15 PM Stef Ford, PT [facility]   5/4/2022 12:15 PM Remesic, Valeri Cabot [facility]   5/13/2022 10:45 AM Remesic, Valeri Cabot [facility]   5/17/2022 11:30 AM Remesic, Valeri Cabot [facility]   5/25/2022 11:30 AM Remesic, Valeri Cabot [facility]   6/1/2022 11:30 AM Remesic, Valeri Cabot [facility]   6/8/2022 11:30 AM Remesic, Valeri Cabot [facility]   6/13/2022 11:30 AM Remesic, Valeri Cabot [facility]   6/22/2022 10:45 AM Christi Giles, PT, DPT [facility]   6/30/2022 11:30 AM Remesic, Valeri Cabot [facility]

## 2022-05-04 ENCOUNTER — HOSPITAL ENCOUNTER (OUTPATIENT)
Dept: PHYSICAL THERAPY | Age: 42
Discharge: HOME | End: 2022-05-04
Payer: COMMERCIAL

## 2022-05-04 PROCEDURE — 97112 NEUROMUSCULAR REEDUCATION: CPT

## 2022-05-04 PROCEDURE — 97140 MANUAL THERAPY 1/> REGIONS: CPT

## 2022-05-04 PROCEDURE — 97530 THERAPEUTIC ACTIVITIES: CPT

## 2022-05-04 NOTE — PROGRESS NOTES
PT DAILY TREATMENT NOTE    Patient Name: Joseph Orn  Date:2022  : 1980  [x]  Patient  Verified  Payor: Lance Rashid / Plan: Keri Delgadillo RPN / Product Type: Commerical /    In time:12:15  Out time:1:20  Total Treatment Time (min): 65  Total Timed Codes (min): 65  1:1 Treatment Time (MC/BCBS only): 72   Visit #: 3 of 11    Treatment Dx: Cervicalgia [M54.2]  Radiculopathy, cervical region [M54.12]  DDD (degenerative disc disease), cervical [M50.30]    SUBJECTIVE  Pain Level (0-10 scale): 4  Any medication changes, allergies to medications, adverse drug reactions, diagnosis change, or new procedure performed?: [x] No    [] Yes (see summary sheet for update)  Subjective functional status/changes:   [] No changes reported  \" Felt better after last time. It is really hard to turn off the pecs and neck. \"    OBJECTIVE    20 min Therapeutic Activity:  [x]  See flow sheet :   Rationale: increase ROM, increase strength, improve coordination and increase proprioception  to improve the patients ability to perform daily activities with decreased pain and symptom levels     35 min Neuromuscular Re-education:  [x]  See flow sheet :   Rationale: increase strength, improve coordination and increase proprioception  to improve the patients ability to perform daily activities with decreased pain and symptom levels    10 min Manual Therapy:  Rib pumping with breaths, infraclavicular rib pumping with verbal consent for hand placement, SOR, STM to right SCM   Rationale: decrease pain, increase ROM and increase tissue extensibility to improve the patients ability to perform daily activities with decreased pain and symptom levels  The manual therapy interventions were performed at a separate and distinct time from the therapeutic activities interventions.     With   [] TE   [] TA   [] neuro   [] other: Patient Education: [x] Review HEP    [] Progressed/Changed HEP based on:   [] positioning   [] body mechanics   [] transfers   [] heat/ice application    [] other:      Other Objective/Functional Measures:   MILAD Special Tests (pre/post)  HGIR:                                                 Right: 47*/80*             Left: 72*/80*  Hip Add Drop Test:                           Right: -/            Left:-/  Trunk Rot                                           Right: 14in/13in    Left 13in /13in    Felt post mediastinum with seated SB bar reach     Pain Level (0-10 scale) post treatment: 1    ASSESSMENT/Changes in Function: pt tolerated session well with reporting decreased pain post session. Pt still challenged with decreasing anterior neck compensation with breathing however improved post session with manual and non manual techniques. Pt responded very well to seated SB bar reach to open left posterior mediastinum however initially needed manual assist at ribs with inhale to use diaphragm. Patient will continue to benefit from skilled PT services to modify and progress therapeutic interventions, address functional mobility deficits, address ROM deficits, address strength deficits, analyze and address soft tissue restrictions, analyze and cue movement patterns, analyze and modify body mechanics/ergonomics, assess and modify postural abnormalities and instruct in home and community integration to attain remaining goals. [x]  See Plan of Care  []  See progress note/recertification  []  See Discharge Summary         Progress towards goals / Updated goals:  Short Term Goals: STG- To be accomplished in 4 visit(s):  1.  Pt will be independent with HEP to encourage prophylaxis.   Eval: HEP dispensed   Current: compliance per pt report goal MET 5/4/22     Long Term Goals: LTG- To be accomplished in 11 visit(s):  1.  Pt will be able to sit > 1 hour typing with pain 1/10 or less to be able to perform job related activities with increased ease   Eval: 9/10 max pain      2.  Pt will demonstrate 4/5 hip add lift score bilaterally to demonstrate improved pelvic stability for crossfit workouts especially overhead movements   Eval:1/5 bilaterally      3.  Pt bilateral shoulder IR AROM will improve to > 70* to demonstrate improved thoracic mobility and increase ease with ADLs. Eval:right 38*, left 55*  Current: 80* bilaterally post session progressing 5/4/22     4.  Pt FOTO score will increase by >/= 9 points to show improvement in subjective function. Eval:58  Current: will address at visit 5  *FOTO score is an established functional score where 100 = no disability*     5. Pt will enter clinic with trunk rotation 14in or less bilaterally to demonstrate carryover with HEP.   Eval: 15.5in right, 15in left   Current: 14in right, 13in left entering clinic progressing well 5/4/22    PLAN  []  Upgrade activities as tolerated     [x]  Continue plan of care  []  Update interventions per flow sheet       []  Discharge due to:_  []  Other:_      Katie aHnna 5/4/2022  12:00 PM    Future Appointments   Date Time Provider Juliette Ames   5/4/2022 12:15 PM Remesic, Kathyleen [place] [facility]   5/13/2022 10:45 AM Remesic, Kathyleen [place] [facility]   5/17/2022 11:30 AM Remesic, Kathyleen [place] [facility]   5/25/2022 11:30 AM Remesic, Kathyleen [place] [facility]   6/1/2022 11:30 AM Remesic, Kathyleen [place] [facility]   6/8/2022 11:30 AM Remesic, Kathyleen [place] [facility]   6/13/2022 11:30 AM Remesic, Kathyleen [place] [facility]   6/22/2022 10:45 AM Yumiko Foley, PT, DPT [facility]   6/30/2022 11:30 AM Remesic, Kathyleen [place] [facility]

## 2022-05-13 ENCOUNTER — HOSPITAL ENCOUNTER (OUTPATIENT)
Dept: PHYSICAL THERAPY | Age: 42
Discharge: HOME | End: 2022-05-13
Payer: COMMERCIAL

## 2022-05-13 PROCEDURE — 97112 NEUROMUSCULAR REEDUCATION: CPT

## 2022-05-13 PROCEDURE — 97140 MANUAL THERAPY 1/> REGIONS: CPT

## 2022-05-13 PROCEDURE — 97530 THERAPEUTIC ACTIVITIES: CPT

## 2022-05-17 ENCOUNTER — HOSPITAL ENCOUNTER (OUTPATIENT)
Dept: PHYSICAL THERAPY | Age: 42
Discharge: HOME | End: 2022-05-17
Payer: COMMERCIAL

## 2022-05-17 PROCEDURE — 97530 THERAPEUTIC ACTIVITIES: CPT

## 2022-05-17 PROCEDURE — 97140 MANUAL THERAPY 1/> REGIONS: CPT

## 2022-05-17 PROCEDURE — 97112 NEUROMUSCULAR REEDUCATION: CPT

## 2022-05-17 NOTE — PROGRESS NOTES
[facility] at the [address], [place], [address] [address]  Phone: [phone]      Fax:  [phone]    Progress Note  Patient name: Jessi Perry Start of Care: 22   Referral source: Lester Hatch MD : 1980   Medical/Treatment Diagnosis: Cervicalgia [M54.2]  Radiculopathy, cervical region [M54.12]  DDD (degenerative disc disease), cervical [M50.30] Onset Date:chronic     Prior Hospitalization: see medical history Provider#: [id number]   Medications: Verified on Patient Summary List    Comorbidities:collagenous colitis,  Ellers Danlos, multiple right shoulder surgeries, right carpal tunnel and cubital tunnl release, L-spine surgery for spondylolisthesis, right ankle dislocation    Prior Level of Function: crossfit owner,  and nutrition counselor, very active, full function without neck and right UT pain and hypersensitivity                              Visits from Start of Care: 5    Missed Visits: 0    Progress Towards Goals:   Short Term Goals: STG- To be accomplished in 4 visit(s):  1.  Pt will be independent with HEP to encourage prophylaxis. Eval: HEP dispensed   Current: compliance per pt report goal MET 22     Long Term Goals: LTG- To be accomplished in 11 visit(s):  1.  Pt will be able to sit > 1 hour typing with pain 1/10 or less to be able to perform job related activities with increased ease   Eval: 9/10 max pain   Current: 8/10 max pain slow progress 22     2.  Pt will demonstrate 4/5 hip add lift score bilaterally to demonstrate improved pelvic stability for crossfit workouts especially overhead movements   Eval:1/5 bilaterally   Current\" 2/5 bilaterally progressing 22     3.  Pt bilateral shoulder IR AROM will improve to > 70* to demonstrate improved thoracic mobility and increase ease with ADLs.   Eval:right 38*, left 55*  Current: right 62* entering, left 90* progressing 22     4.  Pt FOTO score will increase by >/= 9 points to show improvement in subjective function. Eval:58  Current: 55 regression 5/17/22  *FOTO score is an established functional score where 100 = no disability*     5. Pt will enter clinic with trunk rotation 14in or less bilaterally to demonstrate carryover with HEP. Eval: 15.5in right, 15in left   Current: 14in right, 13in left goal MET 5/17/22       Key Functional Changes:  Pt presented to therapy with c/c right UT and neck pain ongoing since right shoulder surgery March 2021. Pt has attended PT here previously with ability to decrease symptoms some. Pt recently had MRI completed of cervical spine with demonstrating DDD, nerve encroachment and stenosis especially distal cervical vertebrae and posterior disc osteophytes. Pt reporting MD recommending cervical fusion however pt wanting to push surgery as far out as possible. Pt has history of elhers-danlos syndrome, lumbar fusion, right shoulder sx and right cubital/carpal tunnel release. Pt has attended 5 sessions with making some progress towards goals and responding well to lumbo pelvic repositioning exercises, left posterior mediastinum expansion, right SCM and UT inhibition and rib mobilizations to help decrease pain. Pt continues to have difficulty with engaging right glut and left abs with low hip add lift scores however improved since eval. Pt is very compliant with HEP and able to use to decrease pain as well as [place] gel. Pt continues to be most challenged with fully participating in working out and working at desk for greater than 2 hours due to significant right UT and neck pain up to 8/10. Pt with continued bra sensitivity as well.  Pt would benefit from continued skilled PT services to address remaining unmet goals below to allow pt complete work duties with less pain and increased ease and prolong cervical fusion per pt request.     Updated Goals: to be achieved in 6 treatments:   See goals above    ASSESSMENT/RECOMMENDATIONS:  [x]Continue therapy per initial plan/protocol at a frequency of  6 visits   []Continue therapy with the following recommended changes:_____________________      _____________________________________________________________________  []Discontinue therapy progressing towards or have reached established goals  []Discontinue therapy due to lack of appreciable progress towards goals  []Discontinue therapy due to lack of attendance or compliance  []Await Physician's recommendations/decisions regarding therapy  []Other:________________________________________________________________    Thank you for this referral.   Tim Mota 5/17/2022 1:38 PM

## 2022-05-17 NOTE — PROGRESS NOTES
PT DAILY TREATMENT NOTE    Patient Name: Amirah Willson  Date:2022  : 1980  [x]  Patient  Verified  Payor: Rollie Skiff / Plan: Nasreen Leiva RPN / Product Type: Commerical /    In time:11:36  Out time:12:36  Total Treatment Time (min): 60  Total Timed Codes (min): 60  1:1 Treatment Time (MC/BCBS only): 60   Visit #: 5 of 11    Treatment Dx: Cervicalgia [M54.2]  Radiculopathy, cervical region [M54.12]  DDD (degenerative disc disease), cervical [M50.30]    SUBJECTIVE  Pain Level (0-10 scale): 3  Any medication changes, allergies to medications, adverse drug reactions, diagnosis change, or new procedure performed?: [x] No    [] Yes (see summary sheet for update)  Subjective functional status/changes:   [] No changes reported  \"Continued pain on the right side. [place] gel and tylenol help with the pain. 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 Pain increases to 8/10 with computer work, working out sometimes, difficulty sleeping at night.      OBJECTIVE      35 min Therapeutic Activity:  [x]  See flow sheet :   Rationale: increase ROM, increase strength, improve coordination and increase proprioception  to improve the patients ability to perform daily activities with decreased pain and symptom levels     15 min Neuromuscular Re-education:  [x]  See flow sheet :   Rationale: increase strength, improve balance and increase proprioception  to improve the patients ability to perform daily activities with decreased pain and symptom levels    10 min Manual Therapy:  MILAD AIC correction, Sternal mobilization with active pelvic tilt, Superior T4 (MILAD technique), Right subclavius release (MILAD technique), MILAD infraclavicular rib pump with active breath   Obtained consent for hand placement      Rationale: decrease pain, increase ROM and increase tissue extensibility to improve the patients ability to perform daily activities with decreased pain and symptom levels  The manual therapy interventions were performed at a separate and distinct time from the therapeutic activities interventions. With   [] TE   [] TA   [] neuro   [] other: Patient Education: [x] Review HEP    [] Progressed/Changed HEP based on:   [] positioning   [] body mechanics   [] transfers   [] heat/ice application    [] other:      Other Objective/Functional Measures: see goals below   Cervical ROM: flexion 45, extension 45*, SB 35* bialterally, right rotation 70*, left 80*  Trunk rotation 14in right, 13in  HGIR 55* right, 68* left   Bilateral shoulder MMT: 5/5 except right IR 4/5  Hip add lift 2/5 bilaterally  Right lumbar compensation with s/l abduction lift    Pain Level (0-10 scale) post treatment: 1    ASSESSMENT/Changes in Function: Pt presented to therapy with c/c right UT and neck pain ongoing since right shoulder surgery March 2021. Pt has attended PT here previously with ability to decrease symptoms some. Pt recently had MRI completed of cervical spine with demonstrating DDD, nerve encroachment and stenosis especially distal cervical vertebrae and posterior disc osteophytes. Pt reporting MD recommending cervical fusion however pt wanting to push surgery as far out as possible. Pt has history of elhers-danlos syndrome, lumbar fusion, right shoulder sx and right cubital/carpal tunnel release. Pt has attended 5 sessions with making some progress towards goals and responding well to lumbo pelvic repositioning exercises, left posterior mediastinum expansion, right SCM and UT inhibition and rib mobilizations to help decrease pain. Pt continues to have difficulty with engaging right glut and left abs with low hip add lift scores however improved since eval. Pt is very compliant with HEP and able to use to decrease pain as well as [place] gel.  Pt continues to be most challenged with fully participating in working out and working at desk for greater than 2 hours due to significant right UT and neck pain up to 8/10. Pt with continued bra sensitivity as well. Pt would benefit from continued skilled PT services to address remaining unmet goals below to allow pt complete work duties with less pain and increased ease and prolong cervical fusion per pt request.     Patient will continue to benefit from skilled PT services to modify and progress therapeutic interventions, address functional mobility deficits, address ROM deficits, address strength deficits, analyze and address soft tissue restrictions, analyze and cue movement patterns, analyze and modify body mechanics/ergonomics, assess and modify postural abnormalities and instruct in home and community integration to attain remaining goals. [x]  See Plan of Care  []  See progress note/recertification  []  See Discharge Summary         Progress towards goals / Updated goals:  Short Term Goals: STG- To be accomplished in 4 visit(s):  1.  Pt will be independent with HEP to encourage prophylaxis. Eval: HEP dispensed   Current: compliance per pt report goal MET 5/4/22     Long Term Goals: LTG- To be accomplished in 11 visit(s):  1.  Pt will be able to sit > 1 hour typing with pain 1/10 or less to be able to perform job related activities with increased ease   Eval: 9/10 max pain   Current: 8/10 max pain slow progress 5/17/22     2.  Pt will demonstrate 4/5 hip add lift score bilaterally to demonstrate improved pelvic stability for crossfit workouts especially overhead movements   Eval:1/5 bilaterally   Current\" 2/5 bilaterally progressing 5/17/22     3.  Pt bilateral shoulder IR AROM will improve to > 70* to demonstrate improved thoracic mobility and increase ease with ADLs. Eval:right 38*, left 55*  Current: right 62* entering, left 90* progressing 5/13/22     4.  Pt FOTO score will increase by >/= 9 points to show improvement in subjective function. Eval:58  Current: 55 regression 5/17/22  *FOTO score is an established functional score where 100 = no disability*     5. Pt will enter clinic with trunk rotation 14in or less bilaterally to demonstrate carryover with HEP.   Eval: 15.5in right, 15in left   Current: 14in right, 13in left goal MET 5/17/22       PLAN  []  Upgrade activities as tolerated     [x]  Continue plan of care  []  Update interventions per flow sheet       []  Discharge due to:_  []  Other:_      Windy Ferro 5/17/2022  7:48 AM    Future Appointments   Date Time Provider Juliette Ames   5/17/2022 11:30 AM RemTim acharya Quant [facility]   5/25/2022 11:30 AM Remesic Tim Quant [facility]   6/1/2022 11:30 AM Rempatrizia Tim Quant [facility]   6/8/2022 11:30 AM Rempatrizia Tim Quant [facility]   6/13/2022 11:30 AM Rempatrizia Tim Quant [facility]   6/22/2022 10:45 AM Julianne Lee, PT, DPT [facility]   6/30/2022 11:30 AM Tim Mota Quant [facility]

## 2022-05-25 ENCOUNTER — APPOINTMENT (OUTPATIENT)
Dept: PHYSICAL THERAPY | Age: 42
End: 2022-05-25
Payer: COMMERCIAL

## 2022-05-27 ENCOUNTER — HOSPITAL ENCOUNTER (OUTPATIENT)
Dept: PHYSICAL THERAPY | Age: 42
Discharge: HOME | End: 2022-05-27
Payer: COMMERCIAL

## 2022-05-27 PROCEDURE — 97530 THERAPEUTIC ACTIVITIES: CPT

## 2022-05-27 PROCEDURE — 97140 MANUAL THERAPY 1/> REGIONS: CPT

## 2022-05-27 PROCEDURE — 97112 NEUROMUSCULAR REEDUCATION: CPT

## 2022-05-27 NOTE — PROGRESS NOTES
PT DAILY TREATMENT NOTE    Patient Name: Leisa Pass  Date:2022  : 1980  [x]  Patient  Verified  Payor: Jam Gabriel / Plan: Mellisa Keene RPN / Product Type: Commerical /    In time:9:22  Out time:10:10  Total Treatment Time (min): 48  Total Timed Codes (min): 48  1:1 Treatment Time (MC/BCBS only): 48   Visit #: 6 of 11    Treatment Dx: Cervicalgia [M54.2]  Radiculopathy, cervical region [M54.12]  DDD (degenerative disc disease), cervical [M50.30]    SUBJECTIVE  Pain Level (0-10 scale): 5  Any medication changes, allergies to medications, adverse drug reactions, diagnosis change, or new procedure performed?: [x] No    [] Yes (see summary sheet for update)  Subjective functional status/changes:   [] No changes reported  \"Pain on both sides of the neck. I have been stressed with work stuff.  I have been taking tylenol 4x day and that helps with the HA\"    OBJECTIVE      28 min Therapeutic Activity:  [x]  See flow sheet :   Rationale: increase ROM, increase strength, improve coordination and increase proprioception  to improve the patients ability to perform daily activities with decreased pain and symptom levels     10 min Neuromuscular Re-education:  [x]  See flow sheet :   Rationale: increase strength, improve coordination and increase proprioception  to improve the patients ability to perform daily activities with decreased pain and symptom levels    10 min Manual Therapy:  MILAD AIC correction, Sternal mobilization with active pelvic tilt, Superior T4 (MILAD technique), Right subclavius release (MILAD technique), MILAD infraclavicular rib pump with active breath   Obtained consent for hand placement    Rationale: decrease pain, increase ROM and increase tissue extensibility to improve the patients ability to perform daily activities with decreased pain and symptom levels  The manual therapy interventions were performed at a separate and distinct time from the therapeutic activities interventions. With   [] TE   [] TA   [] neuro   [] other: Patient Education: [x] Review HEP    [] Progressed/Changed HEP based on:   [] positioning   [] body mechanics   [] transfers   [] heat/ice application    [] other:      Other Objective/Functional Measures:   MILAD Special Tests (pre/post)  HGIR:                                                 Right: 50*/70             Left: 80*/  Trunk Rot                                           Right: 14in/13in    Left 13in /13in    Pain Level (0-10 scale) post treatment: 1    ASSESSMENT/Changes in Function: Pt tolerated session well with reporting decreased pain and improved mobility post session. Pt still challenged with MILAD serratus squat with decreasing anterior neck recruitment however improved some with left cervical SB today. Increased pain today possibly due to increased stress at work this week. Patient will continue to benefit from skilled PT services to modify and progress therapeutic interventions, address functional mobility deficits, address ROM deficits, address strength deficits, analyze and address soft tissue restrictions, analyze and cue movement patterns, analyze and modify body mechanics/ergonomics, assess and modify postural abnormalities and instruct in home and community integration to attain remaining goals.      [x]  See Plan of Care  []  See progress note/recertification  []  See Discharge Summary         Progress towards goals / Updated goals:  Long Term Goals: LTG- To be accomplished in 11 visit(s):  1.  Pt will be able to sit > 1 hour typing with pain 1/10 or less to be able to perform job related activities with increased ease   Eval: 9/10 max pain   Last PN 8/10 max pain slow progress 5/17/22  Current:      2.  Pt will demonstrate 4/5 hip add lift score bilaterally to demonstrate improved pelvic stability for crossfit workouts especially overhead movements   Eval:1/5 bilaterally   Last PN 2/5 bilaterally progressing 5/17/22  Current:    3.  Pt bilateral shoulder IR AROM will improve to > 70* to demonstrate improved thoracic mobility and increase ease with ADLs. Eval:right 38*, left 55*  Last PN: right 62* entering, left 90* progressing 5/13/22  Current: right 50*, left 80* progressing 5/27/22     4.  Pt FOTO score will increase by >/= 9 points to show improvement in subjective function. Eval:58  Last PN 55 regression 5/17/22  Current:   *FOTO score is an established functional score where 100 = no disability*     5. Pt will enter clinic with trunk rotation 14in or less bilaterally to demonstrate carryover with HEP.   Eval: 15.5in right, 15in left   Last PN 14in right, 13in left goal MET 5/17/22       PLAN  []  Upgrade activities as tolerated     [x]  Continue plan of care  []  Update interventions per flow sheet       []  Discharge due to:_  []  Other:_      Michel Reddy 5/27/2022  7:39 AM    Future Appointments   Date Time Provider Juliette Ames   5/27/2022  9:15 AM Remesic, Champ Brew [facility]   6/1/2022 11:30 AM Remesic, Champ Brew [facility]   6/8/2022 11:30 AM Remesic, Champ Brew [facility]   6/13/2022 11:30 AM Remesic, Champ Brew [facility]   6/22/2022 10:45 AM Duayne Holter, PT, DPT [facility]   6/30/2022 11:30 AM Remesic, Champ Brew [facility]

## 2022-06-01 ENCOUNTER — HOSPITAL ENCOUNTER (OUTPATIENT)
Dept: PHYSICAL THERAPY | Age: 42
Discharge: HOME | End: 2022-06-01
Payer: COMMERCIAL

## 2022-06-01 PROCEDURE — 97140 MANUAL THERAPY 1/> REGIONS: CPT

## 2022-06-01 PROCEDURE — 97530 THERAPEUTIC ACTIVITIES: CPT

## 2022-06-01 PROCEDURE — 97112 NEUROMUSCULAR REEDUCATION: CPT

## 2022-06-01 PROCEDURE — 97110 THERAPEUTIC EXERCISES: CPT

## 2022-06-01 NOTE — PROGRESS NOTES
PT DAILY TREATMENT NOTE    Patient Name: Saqib Lobo  Date:2022  : 1980  [x]  Patient  Verified  Payor: [employer] / Plan: Freddy Sandhoff RPN / Product Type: Commerical /    In time:11:33  Out time:12:25  Total Treatment Time (min): 52  Total Timed Codes (min): 52  1:1 Treatment Time (MC/BCBS only): 46   Visit #: 7 of 11    Treatment Dx: Cervicalgia [M54.2]  Radiculopathy, cervical region [M54.12]  DDD (degenerative disc disease), cervical [M50.30]    SUBJECTIVE  Pain Level (0-10 scale): 4-5  Any medication changes, allergies to medications, adverse drug reactions, diagnosis change, or new procedure performed?: [x] No    [] Yes (see summary sheet for update)  Subjective functional status/changes:   [] No changes reported  \"My upper trap is really sore since yesterday. \"    OBJECTIVE    10 min Therapeutic Exercise:  [x] See flow sheet :   Rationale: increase ROM and increase strength to improve the patients ability to perform daily activities with decreased pain and symptom levels    17 min Therapeutic Activity:  [x]  See flow sheet :   Rationale: increase ROM, increase strength, improve coordination and increase proprioception  to improve the patients ability to perform daily activities with decreased pain and symptom levels     15 min Neuromuscular Re-education:  [x]  See flow sheet :   Rationale: improve coordination and increase proprioception  to improve the patients ability to perform daily activities with decreased pain and symptom levels    10 min Manual Therapy:  MILAD AIC correction, Sternal mobilization with active pelvic tilt, Superior T4 (MILAD technique), Right subclavius release (MILAD technique), MILAD infraclavicular rib pump with active breath   Obtained consent for hand placement    Rationale: decrease pain, increase ROM and increase tissue extensibility to improve the patients ability to perform daily activities with decreased pain and symptom levels  The manual therapy interventions were performed at a separate and distinct time from the therapeutic activities interventions. With   [] TE   [] TA   [] neuro   [] other: Patient Education: [x] Review HEP    [] Progressed/Changed HEP based on:   [] positioning   [] body mechanics   [] transfers   [] heat/ice application    [] other:      Other Objective/Functional Measures:   MILAD Special Tests (pre/post)  HGIR:                                                 Right: 65/80             Left: 80/90  Trunk Rot                                           Right: 14in/    Left 14in /    Hip Add Lift (scored 0-5)  Right: 4/  Left: 4/    Decreased neck pain and tightness with cues for tongue pressing into top palate  Lower trap fatigue with right tricep extension with right HGIR  Challenged with maintaining right scapular recruitment in s/l with reach         Pain Level (0-10 scale) post treatment: 1-2    ASSESSMENT/Changes in Function: Pt tolerated session well with reporting decreased pain post session. Able to decrease anterior neck recruitment with exercises with cues for tongue to palate and recruiting scapular muscles on right. Suspect improved hip add lift scores today as well due to improved oblique recruitment. Patient will continue to benefit from skilled PT services to modify and progress therapeutic interventions, address functional mobility deficits, address ROM deficits, address strength deficits, analyze and address soft tissue restrictions, analyze and cue movement patterns, analyze and modify body mechanics/ergonomics, assess and modify postural abnormalities and instruct in home and community integration to attain remaining goals.      [x]  See Plan of Care  []  See progress note/recertification  []  See Discharge Summary         Progress towards goals / Updated goals:  Long Term Goals: LTG- To be accomplished in 11 visit(s):  1.  Pt will be able to sit > 1 hour typing with pain 1/10 or less to be able to perform job related activities with increased ease   Eval: 9/10 max pain   Last PN 8/10 max pain slow progress 5/17/22  Current:      2.  Pt will demonstrate 4/5 hip add lift score bilaterally to demonstrate improved pelvic stability for crossfit workouts especially overhead movements   Eval:1/5 bilaterally   Last PN 2/5 bilaterally progressing 5/17/22  Current: 4/5 bilaterally progressing 6/1/22     3.  Pt bilateral shoulder IR AROM will improve to > 70* to demonstrate improved thoracic mobility and increase ease with ADLs. Eval:right 38*, left 55*  Last PN: right 62* entering, left 90* progressing 5/13/22  Current: right 65*, left 80* entering progressing 6/1/22     4.  Pt FOTO score will increase by >/= 9 points to show improvement in subjective function. Eval:58  Last PN 55 regression 5/17/22  Current:   *FOTO score is an established functional score where 100 = no disability*     5. Pt will enter clinic with trunk rotation 14in or less bilaterally to demonstrate carryover with HEP.   Eval: 15.5in right, 15in left   Last PN 14in right, 13in left goal MET 5/17/22          PLAN  []  Upgrade activities as tolerated     [x]  Continue plan of care  []  Update interventions per flow sheet       []  Discharge due to:_  []  Other:_      Suzan Lott 6/1/2022  9:59 AM    Future Appointments   Date Time Provider Juliette Ames   6/1/2022 11:30 AM Austyn MotaHPJORI [facility]   6/8/2022 11:30 AM Austyn Mota [facility]   6/13/2022 11:30 AM Austyn Mota [facility]   6/22/2022 10:45 AM Max Decree, PT, DPT [facility]   6/30/2022 11:30 AM Austyn MotaHPTBELVIS [facility]

## 2022-06-08 ENCOUNTER — APPOINTMENT (OUTPATIENT)
Dept: PHYSICAL THERAPY | Age: 42
End: 2022-06-08
Payer: COMMERCIAL

## 2022-06-13 ENCOUNTER — HOSPITAL ENCOUNTER (OUTPATIENT)
Dept: PHYSICAL THERAPY | Age: 42
Discharge: HOME | End: 2022-06-13
Payer: COMMERCIAL

## 2022-06-13 PROCEDURE — 97530 THERAPEUTIC ACTIVITIES: CPT

## 2022-06-13 PROCEDURE — 97140 MANUAL THERAPY 1/> REGIONS: CPT

## 2022-06-13 PROCEDURE — 97112 NEUROMUSCULAR REEDUCATION: CPT

## 2022-06-13 NOTE — PROGRESS NOTES
PT DAILY TREATMENT NOTE    Patient Name: Maria Luz Quinones  Date:2022  : 1980  [x]  Patient  Verified  Payor: María Handy / Plan: Karlee Barry RPN / Product Type: Commerical /    In time:11:42  Out time:12:35  Total Treatment Time (min): 53  Total Timed Codes (min): 53  1:1 Treatment Time (MC/BCBS only): 48   Visit #: 8 of 11    Treatment Dx: Cervicalgia [M54.2]  Radiculopathy, cervical region [M54.12]  DDD (degenerative disc disease), cervical [M50.30]    SUBJECTIVE  Pain Level (0-10 scale): 4-5  Any medication changes, allergies to medications, adverse drug reactions, diagnosis change, or new procedure performed?: [x] No    [] Yes (see summary sheet for update)  Subjective functional status/changes:   [] No changes reported  \"It;s more on the left side today. \"    OBJECTIVE    13 min Therapeutic Activity:  [x]  See flow sheet :   Rationale: increase ROM, increase strength, improve coordination and increase proprioception  to improve the patients ability to perform daily activities with decreased pain and symptom levels     25 min Neuromuscular Re-education:  [x]  See flow sheet :   Rationale: increase ROM, increase strength, improve coordination and increase proprioception  to improve the patients ability to perform daily activities with decreased pain and symptom levels    15 min Manual Therapy:  SOR, gentle cervical distraction, STM to bilateral SCM, left levator, diagastrics in supine, infraclavicular rib pumping with verbal consent for hand placement   Rationale: decrease pain, increase ROM, increase tissue extensibility and decrease trigger points to improve the patients ability to perform daily activities with decreased pain and symptom levels  The manual therapy interventions were performed at a separate and distinct time from the therapeutic activities interventions.           With   [] TE   [] TA   [] neuro   [] other: Patient Education: [x] Review HEP    [] Progressed/Changed HEP based on: [] positioning   [] body mechanics   [] transfers   [] heat/ice application    [] other:      Other Objective/Functional Measures:   MILAD Special Tests (pre/post)  HGIR:                                                 Right: 40/80             Left: 90/90  Hip Add Drop Test:                           Right: -/            Left:-/  Trunk Rot                                           Right: 13.5in/    Left 13in /  Hip Add Lift (scored 0-5)  Right: 2/  Left: 2/         Pain Level (0-10 scale) post treatment: 1-2    ASSESSMENT/Changes in Function: Pt tolerated session well with good response with ZOA squat prior to TRX rows with ability to engage upper back. Added ZOA squat and left KB squat with right reach to HEP to help address trunk rotation and thoracic protraction. TTP over left levator noted today. Patient will continue to benefit from skilled PT services to modify and progress therapeutic interventions, address functional mobility deficits, address ROM deficits, address strength deficits, analyze and address soft tissue restrictions, analyze and cue movement patterns, analyze and modify body mechanics/ergonomics, assess and modify postural abnormalities and instruct in home and community integration to attain remaining goals.      [x]  See Plan of Care  []  See progress note/recertification  []  See Discharge Summary         Progress towards goals / Updated goals:  Long Term Goals: LTG- To be accomplished in 11 visit(s):  1.  Pt will be able to sit > 1 hour typing with pain 1/10 or less to be able to perform job related activities with increased ease   Eval: 9/10 max pain   Last PN 8/10 max pain slow progress 5/17/22  Current: pt reporting pain shifting to left side now occasionally progressing 6/13/22     2.  Pt will demonstrate 4/5 hip add lift score bilaterally to demonstrate improved pelvic stability for crossfit workouts especially overhead movements   Eval:1/5 bilaterally   Last PN 2/5 bilaterally progressing 5/17/22  Current: 4/5 bilaterally progressing 6/1/22     3.  Pt bilateral shoulder IR AROM will improve to > 70* to demonstrate improved thoracic mobility and increase ease with ADLs. Eval:right 38*, left 55*  Last PN: right 62* entering, left 90* progressing 5/13/22  Current: right 40* entering, 80* post, left 90* entering progressing 6/13/22     4.  Pt FOTO score will increase by >/= 9 points to show improvement in subjective function. Eval:58  Last PN 55 regression 5/17/22  Current:   *FOTO score is an established functional score where 100 = no disability*     5. Pt will enter clinic with trunk rotation 14in or less bilaterally to demonstrate carryover with HEP.   Eval: 15.5in right, 15in left   Last PN 14in right, 13in left goal MET 5/17/22       PLAN  []  Upgrade activities as tolerated     [x]  Continue plan of care  []  Update interventions per flow sheet       []  Discharge due to:_  []  Other:_      Naoma Laws 6/13/2022  12:49 PM    Future Appointments   Date Time Provider Juliette Ames   6/22/2022 10:45 AM Lisa Becerra, PT, DPT [facility]   7/18/2022 10:45 AM Lisa Becerra, PT, DPT [facility]   7/25/2022 10:45 AM Vikas Mota [facility]

## 2022-06-22 ENCOUNTER — HOSPITAL ENCOUNTER (OUTPATIENT)
Dept: PHYSICAL THERAPY | Age: 42
Discharge: HOME | End: 2022-06-22
Payer: COMMERCIAL

## 2022-06-22 PROCEDURE — 97530 THERAPEUTIC ACTIVITIES: CPT

## 2022-06-22 PROCEDURE — 97140 MANUAL THERAPY 1/> REGIONS: CPT

## 2022-06-22 PROCEDURE — 97112 NEUROMUSCULAR REEDUCATION: CPT

## 2022-06-22 NOTE — PROGRESS NOTES
PT DAILY TREATMENT NOTE    Patient Name: Elie Ennis  Date:2022  : 1980  [x]  Patient  Verified  Payor: Bert Cortés / Plan: Jarad ADHIKARI / Product Type: Commerical /    In time:10:53 am  Out time:11:53 am  Total Treatment Time (min): 60  Total Timed Codes (min): 60  Visit #: 9 of 11    Treatment Dx: Cervicalgia [M54.2]  Radiculopathy, cervical region [M54.12]  DDD (degenerative disc disease), cervical [M50.30]    SUBJECTIVE  Pain Level (0-10 scale): 3 - right side   Any medication changes, allergies to medications, adverse drug reactions, diagnosis change, or new procedure performed?: [] No    [x] Yes (see summary sheet for update)  Subjective functional status/changes:   [] No changes reported  \"Not good. \"  Pt reports that has been having colitis flare up and has recently started on steroids. Steroids last 8 weeks. Was having onset of right foot pain onset 8 weeks ago - went to see Dr. 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 Had cortisone injection - causes racing heart.   Had x-ray and showed Hallux rigidus and bone fragments at achilles - noted that had \"squaring\" of first metatarsal.   Continued increased sensitivity with bra  Alternating pain right upper trap, left pecs, left scalenes, SCM and levator   Describes cervical tightness, right upper trap tightness       OBJECTIVE    30 min Therapeutic Activity:  [x]  See flow sheet : discussion state of current stress, different stressors   Current exercises, new exercises and current exercises for work outs   Rationale: increase ROM, increase strength, improve coordination and increase proprioception  to improve the patients ability to perform daily activities with decreased pain and symptom levels       15 min Neuromuscular Re-education:  [x]  See flow sheet :   Rationale: increase ROM, increase strength, improve coordination and increase proprioception  to improve the patients ability to perform daily activities with decreased pain and symptom levels      15 min Manual Therapy:  MILAD AIC correction, Sternal mobilization with active pelvic tilt, Superior T4 (MILAD technique), Right subclavius release (MILAD technique), MILAD infraclavicular rib pump with active breath   Right Sibson's fascial release   STM to left scooter MISHRA  Obtained consent for hand placement      Rationale: decrease pain, increase ROM, increase tissue extensibility, decrease trigger points and increase postural awareness to improve the patients ability to perform daily activities with decreased pain and symptom levels    The manual therapy interventions were performed at a separate and distinct time from the therapeutic activities interventions. With   [] TE   [] TA   [] neuro   [] other: Patient Education: [x] Review HEP    [] Progressed/Changed HEP based on:   [] positioning   [] body mechanics   [] transfers   [] heat/ice application    [] other:      Other Objective/Functional Measures:   MILAD Special Tests (pre/post)  HGIR:                                                 Right: 64/85             Left: 72/85  Hip Add Drop Test:                           Right: +/-            Left:+/-  Trunk Rot                                            Right: 14 in    Left 14 in   Shoulder Horizontal Abduction           Right: 45 /NT             Left: 24 /43     Apical Ext Test:                                   Right: dcr             Left: dcr  Hip Add Lift (scored 0-5)  Right: 1-2/4  Left: 2/4       Pain Level (0-10 scale) post treatment: 1-2    ASSESSMENT/Changes in Function:   Pt reported continued shifting pain from right side to left side. Today more pain on the right UT than left LS and SCM. Pt under increased stress both personally, school and work. Is having severe colitis flare up requiring 8-week course of steroids. Pt is very compliant with HEP.  Reviewed this session - right UT pain, focus right reach, left IO/TA, with left sided pain, pec inhibition, posterior mediastinum inhibition with left IO/TA. Added new exercise to HEP this session - standing supported passive left AF IR with right trunk rot. All measures cleared after exercise along with supine left pec inhibition. Patient will continue to benefit from skilled PT services to modify and progress therapeutic interventions, address functional mobility deficits, address ROM deficits, address strength deficits, analyze and address soft tissue restrictions, analyze and cue movement patterns, analyze and modify body mechanics/ergonomics, assess and modify postural abnormalities and instruct in home and community integration to attain remaining goals. [x]  See Plan of Care  []  See progress note/recertification  []  See Discharge Summary         Progress towards goals / Updated goals:  Long Term Goals: LTG- To be accomplished in 11 visit(s):  1.  Pt will be able to sit > 1 hour typing with pain 1/10 or less to be able to perform job related activities with increased ease   Eval: 9/10 max pain   Last PN 8/10 max pain slow progress 5/17/22  Current: pt reporting pain shifting between left and right  progressing 6/22/22     2.  Pt will demonstrate 4/5 hip add lift score bilaterally to demonstrate improved pelvic stability for crossfit workouts especially overhead movements   Eval:1/5 bilaterally   Last PN 2/5 bilaterally progressing 5/17/22  Current: improved to 4/5 bilaterally progressing 6/22/22     3.  Pt bilateral shoulder IR AROM will improve to > 70* to demonstrate improved thoracic mobility and increase ease with ADLs. Eval:right 38*, left 55*  Last PN: right 62* entering, left 90* progressing 5/13/22  Current: right 40* entering, 80* post, left 90* entering progressing 6/13/22     4.  Pt FOTO score will increase by >/= 9 points to show improvement in subjective function.   Eval:58  Last PN 55 regression 5/17/22  Current:   *FOTO score is an established functional score where 100 = no disability*     5. Pt will enter clinic with trunk rotation 14in or less bilaterally to demonstrate carryover with HEP.   Eval: 15.5in right, 15in left   Last PN 14in right, 13in left goal MET 5/17/22       PLAN  []  Upgrade activities as tolerated     [x]  Continue plan of care  []  Update interventions per flow sheet       []  Discharge due to:_  []  Other:_      Johnnie Barrera, PT, DPT 6/22/2022  8:20 AM    Future Appointments   Date Time Provider Juliette Ames   6/22/2022 10:45 AM Barbie Plascencia, PT, DPT [facility]   7/18/2022 10:45 AM Barbie Plascencia, PT, DPT [facility]   7/25/2022 10:45 AM Nurys Mota [facility]

## 2022-06-30 ENCOUNTER — APPOINTMENT (OUTPATIENT)
Dept: PHYSICAL THERAPY | Age: 42
End: 2022-06-30
Payer: COMMERCIAL

## 2022-07-18 ENCOUNTER — APPOINTMENT (OUTPATIENT)
Dept: PHYSICAL THERAPY | Age: 42
End: 2022-07-18
Payer: COMMERCIAL

## 2022-07-18 ENCOUNTER — TELEPHONE (OUTPATIENT)
Dept: PHYSICAL THERAPY | Age: 42
End: 2022-07-18

## 2022-07-25 ENCOUNTER — HOSPITAL ENCOUNTER (OUTPATIENT)
Dept: PHYSICAL THERAPY | Age: 42
Discharge: HOME | End: 2022-07-25
Payer: COMMERCIAL

## 2022-07-25 PROCEDURE — 97112 NEUROMUSCULAR REEDUCATION: CPT

## 2022-07-25 PROCEDURE — 97530 THERAPEUTIC ACTIVITIES: CPT

## 2022-07-25 PROCEDURE — 97140 MANUAL THERAPY 1/> REGIONS: CPT

## 2022-07-25 NOTE — PROGRESS NOTES
PT DAILY TREATMENT NOTE    Patient Name: Good Stanton  Date:2022  : 1980  [x]  Patient  Verified  Payor: Jose Suarez / Plan: Aurea Donaldson RPN / Product Type: Commerical /    In time:  Out time: 11:42  Total Treatment Time (min): 61  Total Timed Codes (min): 61  1:1 Treatment Time (MC/BCBS only): 61   Visit #: 10 of 11    Treatment Dx: Cervicalgia [M54.2]  Radiculopathy, cervical region [M54.12]  DDD (degenerative disc disease), cervical [M50.30]    SUBJECTIVE  Pain Level (0-10 scale): 4  Any medication changes, allergies to medications, adverse drug reactions, diagnosis change, or new procedure performed?: [x] No    [] Yes (see summary sheet for update)  Subjective functional status/changes:   [] No changes reported  \"Still on steriods. Lately pain on the right side. Was on the computer . Doing most bodyweight things and 1x week.      OBJECTIVE    16 min Therapeutic Activity:  [x]  See flow sheet :   Rationale: increase ROM, increase strength, improve coordination, and increase proprioception  to improve the patients ability to perform daily activities with decreased pain and symptom levels     30 min Neuromuscular Re-education:  [x]  See flow sheet :   Rationale: improve coordination, improve balance, and increase proprioception  to improve the patients ability to perform daily activities with decreased pain and symptom levels    15 min Manual Therapy:  MILAD AIC correction, Sternal mobilization with active pelvic tilt, Superior T4 (MILAD technique), Right subclavius release (MILAD technique), MILAD infraclavicular rib pump with active breath   Right Sibson's fascial release  STM to left scooter MISHRA  Obtained consent for hand placement    Rationale: decrease pain, increase ROM, and increase tissue extensibility to improve the patients ability to perform daily activities with decreased pain and symptom levels  The manual therapy interventions were performed at a separate and distinct time from the therapeutic activities interventions. With   [] TE   [] TA   [] neuro   [] other: Patient Education: [x] Review HEP    [] Progressed/Changed HEP based on:   [] positioning   [] body mechanics   [] transfers   [] heat/ice application    [] other:      Other Objective/Functional Measures: see goals below  MILAD Special Tests (pre/post)  HGIR:                                                 Right: 55/85             Left: 85/  Hip Add Drop Test:                           Right: -/            Left:-/  Trunk Rot                                           Right: 13in/12in    Left 12in /12in   Hip Add Lift (scored 0-5)  Right: 2/3  Left: 2/3        Pain Level (0-10 scale) post treatment: 1    ASSESSMENT/Changes in Function: Pt presented to therapy with c/c right UT and neck pain ongoing since right shoulder surgery March 2021. Pt has attended PT here previously with ability to decrease symptoms some. Pt recently had MRI completed of cervical spine with demonstrating DDD, nerve encroachment and stenosis especially distal cervical vertebrae and posterior disc osteophytes. Pt reporting MD recommending cervical fusion however pt wanting to push surgery as far out as possible. Pt has history of elhers-danlos syndrome, lumbar fusion, right shoulder sx and right cubital/carpal tunnel release. Pt has attended 10 sessions including initial eval with making some progress towards goals with improved ROM and pelvic stability however still continues to have right UT hypersensitivity that increases with siting at desk. Pain and UT hypersensitivity does decrease after exercises and manual however with focusing on pec inhibition, posterior mediastinum expansion and left oblique facilitation. Plan to finish out last appointment this week with then DC with updated HEP due to insurance.      Patient will continue to benefit from skilled PT services to modify and progress therapeutic interventions, address functional mobility deficits, address ROM deficits, address strength deficits, analyze and address soft tissue restrictions, analyze and cue movement patterns, analyze and modify body mechanics/ergonomics, assess and modify postural abnormalities, and instruct in home and community integration to attain remaining goals. [x]  See Plan of Care  []  See progress note/recertification  []  See Discharge Summary         Progress towards goals / Updated goals:  Long Term Goals: LTG- To be accomplished in 11 visit(s):  1. Pt will be able to sit > 1 hour typing with pain 1/10 or less to be able to perform job related activities with increased ease   Eval: 9/10 max pain   Last PN 8/10 max pain slow progress 5/17/22  Current: 8/10 max pain still with pain mostly on right side in right UE area no change 7/25/22     2. Pt will demonstrate 4/5 hip add lift score bilaterally to demonstrate improved pelvic stability for crossfit workouts especially overhead movements   Eval:1/5 bilaterally   Last PN 2/5 bilaterally progressing 5/17/22  Current: 3/5 leaving clinic progressing 7/25/22     3. Pt bilateral shoulder IR AROM will improve to > 70* to demonstrate improved thoracic mobility and increase ease with ADLs. Eval:right 38*, left 55*  Last PN: right 62* entering, left 90* progressing 5/13/22  Current: HGIR:   Right: 55/85             Left: 85/85 progressing 7/25/22     4. Pt FOTO score will increase by >/= 9 points to show improvement in subjective function. Eval:58  Last PN 55 regression 5/17/22  Current: will reassess NV, PT forgot   *FOTO score is an established functional score where 100 = no disability*     5. Pt will enter clinic with trunk rotation 14in or less bilaterally to demonstrate carryover with HEP.   Eval: 15.5in right, 15in left   Last PN 14in right, 13in left goal MET 5/17/22    PLAN  []  Upgrade activities as tolerated     [x]  Continue plan of care  []  Update interventions per flow sheet       []  Discharge due to:_  []  Other:_ Betty Mota 7/25/2022  10:22 AM    Future Appointments   Date Time Provider [place]te Ames   7/25/2022 10:45 AM Betty Mota [facility]

## 2022-07-25 NOTE — PROGRESS NOTES
[facility] at the [address], [place], [address] [facility]  Phone: [phone]      Fax:  [phone]    Progress Note  Patient name: Jaylon Walsh Start of Care: 22   Referral source: Sita Sterling MD : 1980   Medical/Treatment Diagnosis: Cervicalgia [M54.2]  Radiculopathy, cervical region [M54.12]  DDD (degenerative disc disease), cervical [M50.30] Onset Date:chronic     Prior Hospitalization: see medical history Provider#: [id number]   Medications: Verified on Patient Summary List    Comorbidities: collagenous colitis,  Ellers Danlos, multiple right shoulder surgeries, right carpal tunnel and cubital tunnl release, L-spine surgery for spondylolisthesis, right ankle dislocation    Prior Level of Function: crossfit owner,  and nutrition counselor, very active, full function without neck and right UT pain and hypersensitivity    Visits from Start of Care: 10   Missed Visits: 0    Progress Towards Goals:   Long Term Goals: LTG- To be accomplished in 11 visit(s):  1. Pt will be able to sit > 1 hour typing with pain 1/10 or less to be able to perform job related activities with increased ease   Eval: 9/10 max pain   Last PN 8/10 max pain slow progress 22  Current: 8/10 max pain still with pain mostly on right side in right UE area no change 22     2. Pt will demonstrate 4/5 hip add lift score bilaterally to demonstrate improved pelvic stability for crossfit workouts especially overhead movements   Eval:1/5 bilaterally   Last PN 2/5 bilaterally progressing 22  Current: 3/5 leaving clinic progressing 22     3. Pt bilateral shoulder IR AROM will improve to > 70* to demonstrate improved thoracic mobility and increase ease with ADLs. Eval:right 38*, left 55*  Last PN: right 62* entering, left 90* progressing 22  Current: HGIR:   Right: 55/85                 Left: 85/85 progressing 22     4.   Pt FOTO score will increase by >/= 9 points to show improvement in subjective function. Eval:58  Last PN 55 regression 5/17/22  Current: will reassess NV, PT forgot   *FOTO score is an established functional score where 100 = no disability*     5. Pt will enter clinic with trunk rotation 14in or less bilaterally to demonstrate carryover with HEP. Eval: 15.5in right, 15in left   Last PN 14in right, 13in left goal MET 5/17/22    Key Functional Changes: Pt presented to therapy with c/c right UT and neck pain ongoing since right shoulder surgery March 2021. Pt has attended PT here previously with ability to decrease symptoms some. Pt recently had MRI completed of cervical spine with demonstrating DDD, nerve encroachment and stenosis especially distal cervical vertebrae and posterior disc osteophytes. Pt reporting MD recommending cervical fusion however pt wanting to push surgery as far out as possible. Pt has history of elhers-danlos syndrome, lumbar fusion, right shoulder sx and right cubital/carpal tunnel release. Pt has attended 10 sessions including initial eval with making some progress towards goals with improved ROM and pelvic stability however still continues to have right UT hypersensitivity that increases with siting at desk. Pain and UT hypersensitivity does decrease after exercises and manual however with focusing on pec inhibition, posterior mediastinum expansion and left oblique facilitation. Plan to finish out last appointment this week with then DC with updated HEP due to insurance.       Updated Goals: to be achieved in 1 treatments:   See goals above    ASSESSMENT/RECOMMENDATIONS:  [x]Continue therapy per initial plan/protocol at a frequency of  1 x per week for 1 weeks  []Continue therapy with the following recommended changes:_____________________      _____________________________________________________________________  []Discontinue therapy progressing towards or have reached established goals  []Discontinue therapy due to lack of appreciable progress towards goals  []Discontinue therapy due to lack of attendance or compliance  []Await Physician's recommendations/decisions regarding therapy  []Other:________________________________________________________________    Thank you for this referral.   Betty Mota 7/25/2022 2:16 PM

## 2022-08-17 NOTE — PROGRESS NOTES
[facility] at the [address], [place], [address] [facility] [address]  Phone: [phone]      Fax:  [phone]            Discharge Summary    Patient name: Wilson Avila     Start of Care: 22  Referral source: Zakiya Lopez MD    : 1980  Medical/Treatment Diagnosis: Cervicalgia [M54.2]  Radiculopathy, cervical region [M54.12]  DDD (degenerative disc disease), cervical [M50.30]  Onset Date:chronic  Prior Hospitalization: see medical history   Provider#: [id number]  Comorbidities: collagenous colitis,  Ellers Danlos, multiple right shoulder surgeries, right carpal tunnel and cubital tunnl release, L-spine surgery for spondylolisthesis, right ankle dislocation    Prior Level of Function: crossfit owner,  and nutrition counselor, very active, 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 Pt will be able to sit > 1 hour typing with pain 1/10 or less to be able to perform job related activities with increased ease   Eval: 9/10 max pain   Last PN 8/10 max pain slow progress 22  Current: 8/10 max pain still with pain mostly on right side in right UE area no change 22     2. Pt will demonstrate 4/5 hip add lift score bilaterally to demonstrate improved pelvic stability for crossfit workouts especially overhead movements   Eval:1/5 bilaterally   Last PN 2/5 bilaterally progressing 22  Current: 3/5 leaving clinic progressing 22     3. Pt bilateral shoulder IR AROM will improve to > 70* to demonstrate improved thoracic mobility and increase ease with ADLs.   Eval:right 38*, left 55*  Last PN: right 62* entering, left 90* progressing 22  Current: HGIR:   Right: 55/85 Left: 85/85 progressing 7/25/22     4. Pt FOTO score will increase by >/= 9 points to show improvement in subjective function. Eval:58  Last PN 55 regression 5/17/22  Current: will reassess NV, PT forgot   *FOTO score is an established functional score where 100 = no disability*     5. Pt will enter clinic with trunk rotation 14in or less bilaterally to demonstrate carryover with HEP. Eval: 15.5in right, 15in left   Last PN 14in right, 13in left goal MET 5/17/22     Assessment/ Summary of Care: Pt presented to therapy with c/c right UT and neck pain ongoing since right shoulder surgery March 2021. Pt has attended PT here previously with ability to decrease symptoms some. Pt recently had MRI completed of cervical spine with demonstrating DDD, nerve encroachment and stenosis especially distal cervical vertebrae and posterior disc osteophytes. Pt reporting MD recommending cervical fusion however pt wanting to push surgery as far out as possible. Pt has history of elhers-danlos syndrome, lumbar fusion, right shoulder sx and right cubital/carpal tunnel release. Pt has attended 10 sessions including initial eval with making some progress towards goals with improved ROM and pelvic stability however still continues to have right UT hypersensitivity that increases with siting at desk. Pain and UT hypersensitivity does decrease after exercises and manual however with focusing on pec inhibition, posterior mediastinum expansion and left oblique facilitation. Pt is ready to be DC at this time due to running out of visits per insurance however will continue with HEP.     RECOMMENDATIONS:  [x]Discontinue therapy: [x]Patient has reached or is progressing toward set goals and ran out of insurance visits      []Patient is non-compliant or has abdicated      []Due to lack of appreciable progress towards set goals    Rolando Rosenthal Remesic 8/17/2022 3:20 PM

## 2022-10-07 NOTE — PROGRESS NOTES
[facility] at the [address], [place], [address] [facility]  Phone: [phone]      Fax:  [phone]            Discharge Summary    Patient name: Fiona Mccarty Start of Care: 22   Referral source: JENNIFER Quevedo : 1980   Medical/Treatment Diagnosis: Neck pain [M54.2] Onset Date:Exacerbation over last 6 months         Prior Hospitalization: see medical history Provider#: [id number]   Medications: Verified on Patient Summary List     ComorbiditiesEllers danlos, Hx of back pain, depression, HA, previous surgeries, recent right shoulder RCR, carpal tunnel release and cubital tunnel release    Prior Level of Function: Reports regular headaches during the week, Pain with: wearing bra - has to change regularly, typing, computer use, sitting at desk, right shoulder/scapular clicking and clunking with reaching overhead. Visits from Start of Care: 11    Missed Visits: 0    Reporting Period : 22 to 22    Goals/Measure of Progress:  Short Term Goals: STG- To be accomplished in 5 treatment(s):  1. Pt will be compliant with HEP to encourage prophylaxis. Eval:dispensed new exercise in addition to HEP from shoulder rehab  Last PN: compliance, overview today progressing 22  Current: MET 2/3/22     Long Term Goals: LTG- To be accomplished in 12 treatment(s):  1. Pt will have decreased sensitivity in UT to allow to wear bra a full day without having to change garments multiple times a day to increase tolerance to daily activities an improve quality of life. .  Eval:has to change bra 2-4 times a day   Last PN: has to change 1-3 times per day  progressing 22  Current:  at least 2x day changing bras, able to wear mens tank tops without symptoms slow progress 22     2.   Pt will be able to reach overhead and lower repeatedly without right scapular clunking and clicking and good scapular control to indicate functional scapular stability and increase tolerance to daily and overhead activities . Eval:Scapular Rhythm:marked  dyskinesia and winging bilaterally, small cracking at right shoulder past 90*  Last PN: Progressing 3/0/95 continued clicking, especially overhead, was recently able to perform 4 sets of >10 floor presses with minimal clicking,  Recently very challenged with standing lower trap exercises. Current: still getting clicking with reaching overhead slow progress 2/21/22      3. Pt improve cervical ROM to [facility] in all directions to increase tolerance to daily activities. Eval:  AROM Right Left   Cervical flex: 32       Ext: 40       Lat Flex 35 24   Rot 55 54      Last PN: Progressing 1/28/22   AROM Right Left   Lat Flex 36 34   Rot 60 58      Current:  AROM Right Left   Lat Flex 32* 42*   Rot 68* 80*         4. Pt FOTO score will increase by >15 points to show improvement in function. Eval:55  Last PN: Progressing 2/1/22 64  Current: 66 progressing 2/21/22     5. Pt will report being able to sit and work at desk and computer for 60 min or more to allow to perform job related activities. Eval: pain with computer use and unable to sit at desk to work. Last PN: education to switch other monitor to left versus right side with mild success but continued pain with increased sitting past 30 min  Progressing 2/1/22  Current: pt reporting some relief with shifting monitor to left however still having pain with looking down towards laptopprogressing 2/21/22           Assessment/ Summary of Care:    Pt has been seen for 11 visits with C/C of cervical pain and significant hypersensitivity along UT limiting tolerance to some clothing. Pt has been making progress towards goals with improving ROM and MILAD measures however no change in right UT hypersensitivity with needing to change bras at least 2x day. Pt reports pain has been ongoing but has progressed of last several months with pain in right UT and anterior shoulder.  Cervical ROM is improving however still limited in right SB and rotation with UT compensation noted. Pt noted to have patho pelvis with MILAD tests and measures however able to improve trunk rotation, and hip add lift test with posterior capsule inhibition techniques and less clicking in right shoulder noted post session with repeated presses. Suspect possibly lumbopelvic dysfunction contributing to continued pain due to instability in stance and unable to use right glut max with pressing activities on right. D/C due to insurance coverage.      RECOMMENDATIONS:  [x]Discontinue therapy: [x]Patient has reached or is progressing toward set goals - Insurance coverage       []Patient is non-compliant or has abdicated      []Due to lack of appreciable progress towards set goals    Priyanka Anderson, PT, DPT 10/7/2022 3:01 PM

## 2023-01-01 NOTE — PROGRESS NOTES
PT DAILY TREATMENT NOTE    Patient Name: Lolita Gutierrez  Date:2021  : 1980  [x]  Patient  Verified  Payor: Connie Jesus / Plan: Max Pettit RPN / Product Type: Commerical /    In time:11:03  Out time:12:10  Total Treatment Time (min): 67  Total Timed Codes (min): 67  1:1 Treatment Time ( only): 67   Visit #: 12 of 24    Treatment Area: Pain radiating to right shoulder [M25.511]    SUBJECTIVE  Pain Level (0-10 scale): 2  Any medication changes, allergies to medications, adverse drug reactions, diagnosis change, or new procedure performed?: [x] No    [] Yes (see summary sheet for update)  Subjective functional status/changes:   [] No changes reported  Pt reporting feelling better than Monday. Had f/u with PA with orders to follow what PT says. OBJECTIVE      42 min Therapeutic Exercise:  [x] See flow sheet :   Rationale: increase ROM and increase strength to improve the patients ability to perform daily activities with decreased pain and symptom levels     15 min Neuromuscular Re-education:  [x]  See flow sheet :   Rationale: increase strength, improve coordination and increase proprioception  to improve the patients ability to perform daily activities with decreased pain and symptom levels    10 min Manual Therapy:  Grade II posterior and inferior GHJ mobs, scap mobs in left s/l, infraclavicular rib pump with active breath, sternal mobilization with active pelvic tilt, STM to right infraspintaus   Rationale: decrease pain, increase ROM and increase tissue extensibility to improve the patients ability to perform daily activities with decreased pain and symptom levels  The manual therapy interventions were performed at a separate and distinct time from the therapeutic activities interventions.     With   [] TE   [] TA   [] neuro   [] other: Patient Education: [x] Review HEP    [] Progressed/Changed HEP based on:   [] positioning   [] body mechanics   [] transfers   [] heat/ice application    [] other: Other Objective/Functional Measures:    L (1-5) R (1-5)   Flexors 5  3   Abductors 5  2-   External Rotators 5  4   Internal Rotators 5  3+   Serratus 4+   3+   Rhomboids  5  4   Lower Trapezius  5  3+   Elbow Flexion 5  5   Elbow Extension 5  4     Right shoulder AROM: flexion 155*, abduction 95*, ER 58* @90*, IR 15* @90*       Pain Level (0-10 scale) post treatment: 0    ASSESSMENT/Changes in Function: Pt tolerated session well with no pain post manual today. Added progressive strengthening exercises per protocol with good tolerance with improved ability to recruit scapular muscles. Right shoulder AROM continues to improve with abiltiy to reach > 90* abduction today. Patient will continue to benefit from skilled PT services to modify and progress therapeutic interventions, address functional mobility deficits, address ROM deficits, address strength deficits, analyze and address soft tissue restrictions, analyze and cue movement patterns, analyze and modify body mechanics/ergonomics, assess and modify postural abnormalities and instruct in home and community integration to attain remaining goals. []  See Plan of Care  []  See progress note/recertification  []  See Discharge Summary         Progress towards goals / Updated goals:  Short Term Goals: STG- To be accomplished in 4 week(s):  1.  Pt will be independent with HEP to encourage prophylaxis. Eval: HEP dispensed   Last PN: compliance per pt report goal MET 5/5/21      2. Pt right shoulder flexion PROM will achieve 150deg to demonstrate improved shoulder mobility to be able to progress per protocol. Eval: right shoulder flexion PROM 78deg  Last PN:  6/7/21 progressing Active sh flexion: supine 143 degrees/after PPT exercises 150  Current: 155* AROM goal MET 6/9/21          Long Term Goals: LTG- To be accomplished in 8 week(s):  1.  Pt will report >/=80% improvement in symptoms to be able to complete ADLs with increased ease.    Eval: 4/10 pain at worst by end of day. Unable to put hair up and complete ADLs with ease  Last PN: no pain, but still limited in ADLs per reports   Current:      2.  Pt bilateral shoulder strength will improve in all planes to 5/5 MMT to allow pt to return to working out. Eval: left UE all planes 5/5, unable to assess right UE due to protocol. Will assess when appropriate  Last PN:: Progressing 6/7/21 progressing as per tolerance to exercises. Current: progressing 6/9/21   L (1-5) R (1-5)   Flexors 5  3   Abductors 5  2-   External Rotators 5  4   Internal Rotators 5  3+   Serratus 4+   3+   Rhomboids  5  4   Lower Trapezius  5  3+   Elbow Flexion 5  5   Elbow Extension 5  4     3. Pt right  strength will be equal to the left to allow pt to lift weights working out with increased ease. Eval: right 63#, left 80#  Last PN: 5/28/21  strength:  right 75#, left 80#  Current:      4.  Pt right shoulder AROM will improve to WFL to allow pt to shower and put hair up with increased ease. Eval:  PROM Right   Flexion 78*   ABD  58*   ER @ 90 Degrees @45* 24*   IR @ 90 Degrees @45*45*      Last PN:   progressing 6/7/21 Active sh flexion: supine 143 degrees/after PPT exercises 150 degrees  IR @ 45*: 70*  ER at 45*: 64  Current:  flexion 155*, abduction 95*, ER 58* @90*, IR 15* @90*progressing 6/9/21       5.  Pt FOTO score will increase by >/= 26 points to show improvement in subjective function.   Eval: 43  Last PN: 56  Current:         PLAN  []  Upgrade activities as tolerated     [x]  Continue plan of care  []  Update interventions per flow sheet       []  Discharge due to:_  []  Other:_      Rosalie Ellis 6/9/2021  11:17 AM    Future Appointments   Date Time Provider Juliette Ames   6/14/2021  2:30 PM Gonzales Glaser, PT, DPT [facility]   6/16/2021  1:00 PM Gonzales Glaser PT, DPT [facility]   6/21/2021  2:30 PM Gonzales Glaser PT, DPT [facility]   6/23/2021  1:00 PM Olegario Mota [facility] none

## 2023-03-06 ENCOUNTER — HOSPITAL ENCOUNTER (OUTPATIENT)
Facility: HOSPITAL | Age: 43
Setting detail: RECURRING SERIES
Discharge: HOME | End: 2023-03-09
Payer: COMMERCIAL

## 2023-03-06 PROCEDURE — 97162 PT EVAL MOD COMPLEX 30 MIN: CPT

## 2023-03-06 NOTE — PROGRESS NOTES
[facility] [address], [address] [place], [address] [facility] [address]  Phone: [phone]      Fax:  [phone]      Plan of Care/ Statement of Necessity for Physical Therapy Services      Patient name: Carmel Puckett Start of Care: 3/6/2023   Referral source: Carolyn Torres MD : 1980    Medical Diagnosis: Cervicalgia [M54.2]       Onset Date:Ongoing, exacerbation in last 6 months    Treatment Diagnosis: Cervical Pain                                       Prior Hospitalization: see medical history Provider#: [id number]   Medications: Verified on Patient Summary List   Insurance: Payor: [employer] / Plan: OPTIMA PPO / Product Type: *No Product type* /       Comorbidities: EDS, hx of surgeries, hx of LBP, allergies   Prior Level of Function: wieght lifting, exercise, pain with computer use, sitting, disturbing sleep  Limitations to PLOF: pain, hypermobility and slipping at cervical spine      The Plan of Care and following information is based on the information from the initial evaluation. Assessment/ key information:   Pt is a highly motivated 38 y.o. female with C/C of cervical pain and increased sensitivity of right UT. Pt also reports new onset right hamstring discomfort. Pt has positive hs for EDS, right RCR repair, bilateral CT release, lumbar fusion and prolonged hx of cervical pain and HA. Pt reports imaging shows severe degeneration at 4 levels in cervical spine but is trying to delay surgery for as long as possible due to EDS diagnosis. Pt has consultation at end of month MD specializing in prolotherapy and PRP injections for EDS. Objective measures include postural adaptations - flattened back, tipping and tilting scapula, right lateralization - decreased cervical lateral flexion ROM,decreased trunk rotation, soft tissue restrictions and increased tone, poor scapulaothoracic mechanics, positive hip adduction drop and limited hip IR and ER ROM.  Pt is young and active and could benefit from skilled PT to allow pt to remain as active and possible and hep manage EDS. Evaluation Complexity HistoryHIGH Complexity :3+ comorbidities / personal factors will impact the outcome/ POC  ; Examination MEDIUM Complexity : 3 Standardized tests and measures addressin body structure, function, activity limitation and / or participation in recreation  ;Presentation MEDIUM Complexity : Evolving with changing characteristics  ; Clinical Decision Making MEDIUM Complexity : FOTO score of 26-74 FOTO score = an established functional score where 100 = no disability  Overall Complexity Rating: MEDIUM  Problem List: pain affecting function, decrease ROM, decrease strength, edema affection function, impaired gait/balance, decrease ADL/functional abilities, decrease activity tolerance, and decrease flexibility/joint mobility   Treatment Plan may include any combination of the followin Therapeutic Exercise, 83127 Neuromuscular Re-Education, 94683 Manual Therapy, 73980 Therapeutic Activity, 59023 Self Care/Home Management, 73593 Electrical Stim unattended, 05836 Vasopneumatic Device, 96284 Gait Training, 83802 Needle Insertion w/o Injection (1 or 2 muscles), and 93298 Needle Insertion w/o Injection (3+ muscles)  Vasopnuematic compression justification:  Per bilateral girth measures taken and listed above the edema is considered significant and having an impact on the patient's strength, balance, gait, transfers, self care, and ADLs  Patient / Family readiness to learn indicated by: asking questions, trying to perform skills, interest, return verbalization , and return demonstration   Persons(s) to be included in education: patient (P)  Barriers to Learning/Limitations: None  Measures taken if barriers to learning present: NA  Patient Goal (s): Strengthen muscles to create better stability.  Work on dysfunctions in shoulder girdle and neck  Patient Self Reported Health Status: good  Rehabilitation Potential: good    Short Term Goals: STG- To be accomplished in 4 visit(s):  1. Pt will be independent with HEP to encourage prophylaxis. Eval:dispensed      2. Pt will improve hip adduction drop to negative with HEP to indicate improved femoral acetabular mobility needed for gait. Eval: positive bilaterally      Long Term Goals: LTG- To be accomplished in 12 visit(s):  1. Pt will improve hip adduction lift to negative bilaterally to indicate ability to load each LE during stance phase of gait. Eval:0/5 bilaterally      2. Pt will will report 50% reduction in pain to improve quality of life. Eval:max pain 8/10, never below 2/10 - 3 HA per week     3. Pt reach overhead with weight object repeatedly to increase tolerance to daily and recreational activities  Eval:Scapular Rhythm: dyskinesia bilaterally   Shoulder/Scapula: tipping, tilting at bilateral vertebral borders of scapular     4. Pt FOTO score will increase by 6 points to show improvement in function. Eval:67  Current: will address at visit 5  *FOTO score is an established functional score where 100 = no disability*     Frequency / Duration: Patient to be seen 12 treatments    Patient/ Caregiver education and instruction: Diagnosis, prognosis, self care, activity modification, and exercises    [x]  Plan of care has been reviewed with PTA    Certification Period: SEVERO Suarez, PT 3/6/2023 2:30 PM  _____________________________________________________________________  I certify that the above Therapy Services are being furnished while the patient is under my care. I agree with the treatment plan and certify that this therapy is necessary. Physician's Signature:____________Date:_________TIME:________                                      Carolee Vasques MD  ** Signature, Date and Time must be completed for valid certification **    Please sign and return to :   [facility]  [address] [address] [facility], [address] [facility]    Phone: [phone]      Fax:  [phone]

## 2023-03-06 NOTE — PROGRESS NOTES
PHYSICAL / OCCUPATIONAL THERAPY - DAILY TREATMENT NOTE (updated )    Patient Name: Reynaldo Nicole    Date: 3/6/2023    : 1980  Insurance: Payor: Timothy Smith / Plan: OPTIMA PPO / Product Type: *No Product type* /      Patient  verified Yes     Visit #   Current / Total 1 12   Time   In / Out 11:40 am 12:45 pm   Pain   In / Out 3 3   Subjective Functional Status/Changes: See eval   Changes to:  Meds, Allergies, Med Hx, Sx Hx? If yes, update Summary List no       TREATMENT AREA =  Cervical Pain     Hx Present Illness: C/C of cervical pain   DX with severe DDD, DJD, Spondylosis, shifting and C4-5  Per pt MD feels that needs to have 4 level cervical fusion but trying to delay as long as possible  Has consultation with Dr. Stewart Acevedo end of the month  Consultation for PRP or Prolotherapy injections   PLOF: wieght lifting, exercise, pain with computer use, sitting, disturbing sleep  Limitations to PLOF: pain, hypermobility and slipping at cervical spine  Mechanism of Injury: None, degeneration   Current symptoms/Complaints: chronic UT sensitivity, can be on the left UT, right hamstring pain   Left shoulder has clicking   \"Weird mechanics\" with pressing overhead   Right foot pain - donato's neuroma - has been changed with shoes  Has invisalign braces   \"There might be some hip stuff going on. \"  Regular HA - 3 times per week.     Pain:  _8__/10 max       __2_/10 min     _3___/10 now    Location:from mastoid process, down UT  Right hamstring   HA - suboccipital and temple region     [] Sharp    [] Dull      [] Burning     []  Aching     [] Throbbing      [] Tingling     [] Other:       [x]  Constant                   [] Intermittent        Previous treatment:   PT, massage, just started red light therapy     PMHX: PMHx/Surgical Hx:  EDS, Lumbar fusion, right RCR, carpal tunnel release, ulnar nerve release     Social/Recreation/Work: Work Hx: self employed - nutrition coaching, finishing master's program  Living Situation: lives with spouse  Recreational Activities: work out, weight lifting, hot yoga 1 x per week      Patient Goal(s): \"Strengthen muscles to create better stability. Work on dysfunctions in shoulder girdle and neck. \"    Cognition: A & O x 4    OBJECTIVE    Therapeutic Procedures:    61 min [x]Eval  - untimed                   Tx Min Billable or 1:1 Min (if diff from Tx Min) Procedure, Rationale, Specifics   [phone] Manual Therapy (timed):  decrease pain, increase ROM, increase tissue extensibility, decrease trigger points, and increase postural awareness to improve patient's ability to progress to PLOF and address remaining functional goals. The manual therapy interventions were performed at a separate and distinct time from the therapeutic activities interventions . (see flow sheet as applicable)     Details if applicable:  BENSON AIC correction, Sternal mobilization with active pelvic tilt, Superior T4 (BENSON technique), Right subclavius release (BENSON technique), BENSON infraclavicular rib pump with active breath   Obtained consent for hand placement        [phone] Therapeutic Activity (timed):  use of dynamic activities replicating functional movements to increase ROM, strength, coordination, balance, and proprioception in order to improve patient's ability to progress to PLOF and address remaining functional goals.   (see flow sheet as applicable)     Details if applicable:  reviewed HEP - 90 90 right reach, seated SB reach, S/L Hip lift, right side lying apical expansion, stand passive AF IR          Details if applicable:            Details if applicable:            Details if applicable:     14 0 MC BC Totals Reminder: bill using total billable min of TIMED therapeutic procedures (example: do not include dry needle or estim unattended, both untimed codes, in totals to left)  8-22 min = 1 unit; 23-37 min = 2 units; 38-52 min = 3 units; 53-67 min = 4 units; 68-82 min = 5 units   Total Total     TOTAL TREATMENT TIME:        59     [x]  Patient Education billed concurrently with other procedures   [x] Review HEP    [] Progressed/Changed HEP, detail:    [] Other detail:       Objective Information/Functional Measures/Assessment    Movement/gait:      Visual Inspection: Thoracic: flattened  Lumbar: decreased   Shoulder/Scapula: tipping, tilting at bilateral vertebral borders of scapular    Palpation: noted marked tone and tension in paraspinals, right more pronounced than left   Bilateral rib flare  Increased tone and TTP at right SCM  While reports pain and sensitivity in right UT did not note marked increase in tone  Limited OA ext                           AROM Right Left   Cervical Flex: WFL     Ext: WFL     Rot 60 58   Lat Flex 30 28               Strength Right Left   Shoulder Flex 4- 4-   Abduction 4- 4-   Scaption  4- 4-   IR 4 4   ER 4 4            Special Tests Right Left   SLR - -   Passive  110   90-90 hamstring - -                    Other Right Left                                       Other Comments: Standing Forward Flexion palms to floor, no reversal of lordosis  Right lateralization   Gillet: hypomobile left > right   Scapular Rhythm: dyskinesia bilaterally     BENSON Special Tests:  HGIR:                                                 Right: 65             Left: 7-  Hip Add Drop Test:                           Right: +            Left:+  Trunk Rot                                           Right: 15 in    Left 14 in   Shoulder Horizontal Abduction          Right: 35              Left: 28      Apical Ext Test:                                  Right: dcr             Left: dcr  FA IR     Right: 28  Left: 26        Pt is a highly motivated 38 y.o. female with C/C of cervical pain and increased sensitivity of right UT. Pt also reports new onset right hamstring discomfort. Pt has positive hs for EDS, right RCR repair, bilateral CT release, lumbar fusion and prolonged hx of cervical pain and HA.  Pt reports imaging shows severe degeneration at 4 levels in cervical spine but is trying to delay surgery for as long as possible due to EDS diagnosis. Pt has consultation at end of month MD specializing in prolotherapy and PRP injections for EDS. Objective measures include postural adaptations - flattened back, tipping and tilting scapula, right lateralization - decreased cervical lateral flexion ROM,decreased trunk rotation, soft tissue restrictions and increased tone, poor scapulaothoracic mechanics, positive hip adduction drop and limited hip IR and ER ROM. Pt is young and active and could benefit from skilled PT to allow pt to remain as active and possible and hep manage EDS. Patient will continue to benefit from skilled PT / OT services to modify and progress therapeutic interventions, analyze and address functional mobility deficits, analyze and address ROM deficits, analyze and address strength deficits, analyze and address soft tissue restrictions, analyze and cue for proper movement patterns, analyze and modify for postural abnormalities, and instruct in home and community integration to address functional deficits and attain remaining goals. Progress toward goals / Updated goals:  []  See Progress Note/Recertification    Short Term Goals: STG- To be accomplished in 4 visit(s):  1. Pt will be independent with HEP to encourage prophylaxis. Eval:dispensed     2. Pt will improve hip adduction drop to negative with HEP to indicate improved femoral acetabular mobility needed for gait. Eval: positive bilaterally     Long Term Goals: LTG- To be accomplished in 12 visit(s):  1. Pt will improve hip adduction lift to negative bilaterally to indicate ability to load each LE during stance phase of gait. Eval:0/5 bilaterally     2. Pt will will report 50% reduction in pain to improve quality of life. Eval:max pain 8/10, never below 2/10 - 3 HA per week    3.   Pt reach overhead with weight object repeatedly to increase tolerance to daily and recreational activities  Eval:Scapular Rhythm: dyskinesia bilaterally   Shoulder/Scapula: tipping, tilting at bilateral vertebral borders of scapular    4. Pt FOTO score will increase by 6 points to show improvement in function. Eval:67  Current: will address at visit 5  *FOTO score is an established functional score where 100 = no disability*      PLAN  Yes  Continue plan of care  []  Upgrade activities as tolerated  []  Discharge due to :  []  Other:    Mariana Charlton, PT    3/6/2023    11:39 AM    No future appointments.

## 2023-04-03 ENCOUNTER — HOSPITAL ENCOUNTER (OUTPATIENT)
Facility: HOSPITAL | Age: 43
Setting detail: RECURRING SERIES
Discharge: HOME | End: 2023-04-06
Payer: COMMERCIAL

## 2023-04-03 PROCEDURE — 97112 NEUROMUSCULAR REEDUCATION: CPT

## 2023-04-03 PROCEDURE — 97530 THERAPEUTIC ACTIVITIES: CPT

## 2023-04-03 NOTE — PROGRESS NOTES
min = 3 units; 53-67 min = 4 units; 68-82 min = 5 units   Total Total     TOTAL TREATMENT TIME:        55     [x]  Patient Education billed concurrently with other procedures   [x] Review HEP    [] Progressed/Changed HEP, detail:    [] Other detail:       Objective Information/Functional Measures/Assessment    BENSON Special Tests (pre/post)  HGIR:                                                 Right: 55/ 80            Left: 70/90    Hip Add Drop Test:                           Right: -/            Left:-/  Trunk Rot                                           Right: 13.5in/13in    Left 13in /13in    Pt tolerated session well with reporting less pain post session. Pt reporting overall less right HS tightness. Responded well to standing activities with ability to facilitate left HS. Patient will continue to benefit from skilled PT / OT services to modify and progress therapeutic interventions, analyze and address functional mobility deficits, analyze and address ROM deficits, analyze and address strength deficits, analyze and address soft tissue restrictions, analyze and cue for proper movement patterns, analyze and modify for postural abnormalities, and instruct in home and community integration to address functional deficits and attain remaining goals. Progress toward goals / Updated goals:  []  See Progress Note/Recertification    Short Term Goals: STG- To be accomplished in 4 visit(s):  1. Pt will be independent with HEP to encourage prophylaxis. Eval:dispensed   Current: compliance per pt report      2. Pt will improve hip adduction drop to negative with HEP to indicate improved femoral acetabular mobility needed for gait. Eval: positive bilaterally      Long Term Goals: LTG- To be accomplished in 12 visit(s):  1. Pt will improve hip adduction lift to negative bilaterally to indicate ability to load each LE during stance phase of gait. Eval:0/5 bilaterally      2.   Pt will will report 50% reduction

## 2023-04-21 ENCOUNTER — APPOINTMENT (OUTPATIENT)
Facility: HOSPITAL | Age: 43
End: 2023-04-21
Payer: COMMERCIAL

## 2023-04-24 ENCOUNTER — APPOINTMENT (OUTPATIENT)
Facility: HOSPITAL | Age: 43
End: 2023-04-24
Payer: COMMERCIAL

## 2023-05-23 ENCOUNTER — TELEPHONE (OUTPATIENT)
Facility: HOSPITAL | Age: 43
End: 2023-05-23

## 2023-05-31 ENCOUNTER — TELEPHONE (OUTPATIENT)
Facility: HOSPITAL | Age: 43
End: 2023-05-31

## (undated) DEVICE — STERILE POLYISOPRENE POWDER-FREE SURGICAL GLOVES WITH EMOLLIENT COATING: Brand: PROTEXIS

## (undated) DEVICE — BIT DRL SLD 4.4X60 MM W/ STP DISP

## (undated) DEVICE — DRAPE,U/ SHT,SPLIT,PLAS,STERIL: Brand: MEDLINE

## (undated) DEVICE — SYRINGE BLB 50CC IRRIG PLIABLE FNGR FLNG GRAD FLSK DISP

## (undated) DEVICE — PACK PROCEDURE SURG KNEE ARTHSCP CUST

## (undated) DEVICE — STERILE POLYISOPRENE POWDER-FREE SURGICAL GLOVES: Brand: PROTEXIS

## (undated) DEVICE — SUT MONOCRYL PLUS UD 3-0 --

## (undated) DEVICE — Z DISCONTINUED USE (MFG CAT 7984-37) SOLUTION IV SODIUM CHL 0.9% 100 ML INJ

## (undated) DEVICE — 4.5 MM HELICUT STRAIGHT BURRS,                                    POWER/EP-1, SLATE, 5000 MAXIMUM RPM,                                    PACKAGED 6 PER BOX, STERILE: Brand: DYONICS HELICUT

## (undated) DEVICE — NEEDLE HYPO 25GA L1.5IN BVL ORIENTED ECLIPSE

## (undated) DEVICE — CANNULA THREADED FLEX 8.0 X 72MM: Brand: CLEAR-TRAC

## (undated) DEVICE — TRAY CATH 16FR DRN BG LF -- CONVERT TO ITEM 363158

## (undated) DEVICE — NEEDLE HYPO 25GA L1.5IN BLU POLYPR HUB S STL REG BVL STR

## (undated) DEVICE — SUTURE VCRL + SZ 2-0 L18IN ABSRB VLT CT-2 1/2 CIR TAPERCUT VCP726D

## (undated) DEVICE — REM POLYHESIVE ADULT PATIENT RETURN ELECTRODE: Brand: VALLEYLAB

## (undated) DEVICE — PAD,ABDOMINAL,5"X9",ST,LF,25/BX: Brand: MEDLINE INDUSTRIES, INC.

## (undated) DEVICE — SOLUTION IRRIG 3000ML LAC R FLX CONT

## (undated) DEVICE — SHOULDER CANNULA SET WITHOUT FENESTRATIONS, 5.5 MM (I.D.) X 70 MM: Brand: CONMED

## (undated) DEVICE — GOWN,AURORA,NONRNF,XL,30/CS: Brand: MEDLINE

## (undated) DEVICE — MASTISOL ADHESIVE LIQ 2/3ML

## (undated) DEVICE — DRAPE,SHOULDER,BEACH CHAIR,STERILE: Brand: MEDLINE

## (undated) DEVICE — GARMENT,MEDLINE,DVT,INT,CALF,MED, GEN2: Brand: MEDLINE

## (undated) DEVICE — ZIMMER® STERILE DISPOSABLE TOURNIQUET CUFF WITH PROTECTIVE SLEEVE AND PLC, SINGLE PORT, SINGLE BLADDER, 18 IN. (46 CM)

## (undated) DEVICE — NON-ADHERENT STRIPS,OIL EMULSION: Brand: CURITY

## (undated) DEVICE — X-RAY SPONGES,12 PLY: Brand: DERMACEA

## (undated) DEVICE — PACK PROCEDURE SURG EXTREMITY CUST

## (undated) DEVICE — FIRSTPASS ST SUTURE PASSER, SELF CAPTURE: Brand: FIRSTPASS

## (undated) DEVICE — PREP SKN CHLRAPRP APL 26ML STR --

## (undated) DEVICE — PLUS HANDPIECE WITH SPRAY TIP: Brand: SURGILAV

## (undated) DEVICE — CTS RELIEF KIT, CARPAL TUNNEL SYNDROME RELIEF KIT: Brand: CTS RELIEF KIT

## (undated) DEVICE — BIPOLAR SEALER 23-121-1 AQM EVS: Brand: AQUAMANTYS™

## (undated) DEVICE — BANDAGE COMPR W4INXL10YD WHITE/BEIGE E MTRX HK LOOP CLSR

## (undated) DEVICE — Device

## (undated) DEVICE — SOLUTION LACTATED RINGERS INJECTION USP

## (undated) DEVICE — SUTURE ABSORBABLE BRAIDED 1-0 OS-8 CR 3X18 IN UD VICRYL J757T

## (undated) DEVICE — PREP SKN PREVAIL 40ML APPL --

## (undated) DEVICE — TUBING PMP L8FT LNG W/ CONN FOR AR-6400 REDEUCE

## (undated) DEVICE — PACK PROCEDURE SURG LAMINECTOMY SPINE CUST

## (undated) DEVICE — TUBE IRRIG L8IN LNG PT W/ CONN FOR PMP SYS REDEUCE

## (undated) DEVICE — MEDI-VAC SUCTION HANDLE REGULAR CAPACITY: Brand: CARDINAL HEALTH

## (undated) DEVICE — DRAIN KT WND 10FR RND 400ML --

## (undated) DEVICE — STRIP,CLOSURE,WOUND,MEDI-STRIP,1/2X4: Brand: MEDLINE

## (undated) DEVICE — ROUND DISSECTORS: Brand: DEROYAL

## (undated) DEVICE — ULTRATAPE SUTURE COBRAID BLUE, 6                                    PER BOX: Brand: ULTRATAPE

## (undated) DEVICE — 3M™ STERI-DRAPE™ U-DRAPE 1015: Brand: STERI-DRAPE™

## (undated) DEVICE — KENDALL SCD EXPRESS SLEEVES, KNEE LENGTH, MEDIUM: Brand: KENDALL SCD

## (undated) DEVICE — SOL IRRIGATION INJ NACL 0.9% 500ML BTL

## (undated) DEVICE — 3.0MM PRECISION NEURO (MATCH HEAD)

## (undated) DEVICE — ABDOMINAL PAD: Brand: DERMACEA

## (undated) DEVICE — SPONGE GZ W4XL4IN COT 12 PLY TYP VII WVN C FLD DSGN